# Patient Record
Sex: FEMALE | Race: WHITE | Employment: OTHER | URBAN - NONMETROPOLITAN AREA
[De-identification: names, ages, dates, MRNs, and addresses within clinical notes are randomized per-mention and may not be internally consistent; named-entity substitution may affect disease eponyms.]

---

## 2017-01-05 ENCOUNTER — APPOINTMENT (OUTPATIENT)
Dept: LAB | Age: 67
End: 2017-01-05
Attending: FAMILY MEDICINE
Payer: MEDICARE

## 2017-01-05 DIAGNOSIS — R16.0 HEPATOMEGALY: ICD-10-CM

## 2017-01-05 DIAGNOSIS — K76.0 FATTY LIVER: ICD-10-CM

## 2017-01-05 LAB
ALPHA 1 ANTITRYPSIN SERUM: 127 MG/DL (ref 90–200)
CERULOPLASMIN: 29.4 MG/DL (ref 20–60)
DEPRECATED HBV CORE AB SER IA-ACNC: 144.2 NG/ML (ref 10–291)
HAV IGM SER QL: NONREACTIVE
HBV CORE IGM SER QL: NONREACTIVE
HBV SURFACE AB SER QL: NONREACTIVE
HBV SURFACE AB SERPL IA-ACNC: <3.1 MIU/ML
HBV SURFACE AG SERPL QL IA: NONREACTIVE
HEPATITIS C VIRUS AB INTERPRETATION: NONREACTIVE
IMMUNOGLOBULIN A: 267 MG/DL (ref 70–312)
IRON SATURATION: 22 % (ref 13–45)
IRON: 94 UG/DL (ref 28–170)
TOTAL IRON BINDING CAPACITY: 419 UG/DL (ref 298–536)
TRANSFERRIN: 281 MG/DL (ref 200–360)

## 2017-01-05 PROCEDURE — 82784 ASSAY IGA/IGD/IGG/IGM EACH: CPT

## 2017-01-05 PROCEDURE — 86038 ANTINUCLEAR ANTIBODIES: CPT

## 2017-01-05 PROCEDURE — 82728 ASSAY OF FERRITIN: CPT

## 2017-01-05 PROCEDURE — 82390 ASSAY OF CERULOPLASMIN: CPT

## 2017-01-05 PROCEDURE — 83516 IMMUNOASSAY NONANTIBODY: CPT

## 2017-01-05 PROCEDURE — 82103 ALPHA-1-ANTITRYPSIN TOTAL: CPT

## 2017-01-05 PROCEDURE — 84432 ASSAY OF THYROGLOBULIN: CPT

## 2017-01-05 PROCEDURE — 80074 ACUTE HEPATITIS PANEL: CPT

## 2017-01-05 PROCEDURE — 83540 ASSAY OF IRON: CPT

## 2017-01-05 PROCEDURE — 36415 COLL VENOUS BLD VENIPUNCTURE: CPT

## 2017-01-05 PROCEDURE — 83550 IRON BINDING TEST: CPT

## 2017-01-05 PROCEDURE — 86706 HEP B SURFACE ANTIBODY: CPT

## 2017-01-05 PROCEDURE — 86708 HEPATITIS A ANTIBODY: CPT

## 2017-01-06 LAB — HEPATITIS A VIRUS AB, TOTAL: REACTIVE

## 2017-01-07 LAB
F-ACTIN (SMOOTH MUSCLE) AB: 8 UNITS
MITOCHONDRIAL M2 AB, IGG: 5 UNITS

## 2017-01-08 LAB — ANA SCREEN: POSITIVE

## 2017-01-12 ENCOUNTER — PATIENT OUTREACH (OUTPATIENT)
Dept: FAMILY MEDICINE CLINIC | Facility: CLINIC | Age: 67
End: 2017-01-12

## 2017-01-17 ENCOUNTER — LAB ENCOUNTER (OUTPATIENT)
Dept: LAB | Age: 67
End: 2017-01-17
Attending: FAMILY MEDICINE
Payer: MEDICARE

## 2017-01-17 ENCOUNTER — NURSE ONLY (OUTPATIENT)
Dept: FAMILY MEDICINE CLINIC | Facility: CLINIC | Age: 67
End: 2017-01-17

## 2017-01-17 DIAGNOSIS — Z23 NEED FOR VACCINATION: Primary | ICD-10-CM

## 2017-01-17 DIAGNOSIS — K76.0 FATTY LIVER: ICD-10-CM

## 2017-01-17 DIAGNOSIS — R16.2 HEPATOSPLENOMEGALY: ICD-10-CM

## 2017-01-17 PROCEDURE — 86225 DNA ANTIBODY NATIVE: CPT

## 2017-01-17 PROCEDURE — 86376 MICROSOMAL ANTIBODY EACH: CPT

## 2017-01-17 PROCEDURE — 86038 ANTINUCLEAR ANTIBODIES: CPT

## 2017-01-17 PROCEDURE — 90746 HEPB VACCINE 3 DOSE ADULT IM: CPT | Performed by: FAMILY MEDICINE

## 2017-01-17 PROCEDURE — 83516 IMMUNOASSAY NONANTIBODY: CPT

## 2017-01-17 PROCEDURE — 36415 COLL VENOUS BLD VENIPUNCTURE: CPT

## 2017-01-17 PROCEDURE — G0010 ADMIN HEPATITIS B VACCINE: HCPCS | Performed by: FAMILY MEDICINE

## 2017-01-17 PROCEDURE — 86235 NUCLEAR ANTIGEN ANTIBODY: CPT

## 2017-01-19 LAB
LIVER-KIDNEY MICROSOME-1, IGG: 3.3 U
TISSUE TRANSGLUTAMINASE AB,IGA: 3.6 U/ML (ref ?–15)

## 2017-01-20 LAB
CENTROMERE AUTOAB: <100 AU/ML (ref ?–100)
DSDNA AUTOAB: <100 IU/ML (ref ?–100)
HISTONE AUTOAB: <100 AU/ML (ref ?–100)
JO-1 AUTOAB: <100 AU/ML (ref ?–100)
RNP AUTOAB: <100 AU/ML (ref ?–100)
SCL-70 AUTOAB: <100 AU/ML (ref ?–100)
SM AUTOAB (SMITH): <100 AU/ML (ref ?–100)
SSA AUTOAB: 109 AU/ML (ref ?–100)
SSB AUTOAB: <100 AU/ML (ref ?–100)

## 2017-01-25 ENCOUNTER — PATIENT OUTREACH (OUTPATIENT)
Dept: CASE MANAGEMENT | Age: 67
End: 2017-01-25

## 2017-01-25 NOTE — PROGRESS NOTES
Spoke to pt for CCM. Pt requested NCM f/u next month in February. Will f/u as requested. Encounter closing.

## 2017-01-27 ENCOUNTER — TELEPHONE (OUTPATIENT)
Dept: FAMILY MEDICINE CLINIC | Facility: CLINIC | Age: 67
End: 2017-01-27

## 2017-01-27 NOTE — TELEPHONE ENCOUNTER
Results are received from [facility] for the patient's Dexa Scan   DOS 1/26/16    Results indicated bone density is in osteopenic range   Best protection is calcium/vit D through diet and exercise    Patient advised and verbalized her understanding

## 2017-02-01 ENCOUNTER — MED REC SCAN ONLY (OUTPATIENT)
Dept: FAMILY MEDICINE CLINIC | Facility: CLINIC | Age: 67
End: 2017-02-01

## 2017-02-17 ENCOUNTER — NURSE ONLY (OUTPATIENT)
Dept: FAMILY MEDICINE CLINIC | Facility: CLINIC | Age: 67
End: 2017-02-17

## 2017-02-17 DIAGNOSIS — Z23 NEED FOR VACCINATION: Primary | ICD-10-CM

## 2017-02-17 PROCEDURE — G0010 ADMIN HEPATITIS B VACCINE: HCPCS | Performed by: FAMILY MEDICINE

## 2017-02-17 PROCEDURE — 90746 HEPB VACCINE 3 DOSE ADULT IM: CPT | Performed by: FAMILY MEDICINE

## 2017-02-28 ENCOUNTER — PATIENT OUTREACH (OUTPATIENT)
Dept: CASE MANAGEMENT | Age: 67
End: 2017-02-28

## 2017-03-06 ENCOUNTER — TELEPHONE (OUTPATIENT)
Dept: FAMILY MEDICINE CLINIC | Facility: CLINIC | Age: 67
End: 2017-03-06

## 2017-03-06 RX ORDER — AMLODIPINE BESYLATE 10 MG/1
10 TABLET ORAL DAILY
Qty: 90 TABLET | Refills: 3 | Status: SHIPPED | OUTPATIENT
Start: 2017-03-06 | End: 2018-02-10

## 2017-03-16 ENCOUNTER — MED REC SCAN ONLY (OUTPATIENT)
Dept: FAMILY MEDICINE CLINIC | Facility: CLINIC | Age: 67
End: 2017-03-16

## 2017-03-23 ENCOUNTER — CHARTING TRANS (OUTPATIENT)
Dept: OTHER | Age: 67
End: 2017-03-23

## 2017-03-27 ENCOUNTER — OFFICE VISIT (OUTPATIENT)
Dept: FAMILY MEDICINE CLINIC | Facility: CLINIC | Age: 67
End: 2017-03-27

## 2017-03-27 VITALS
HEIGHT: 62.5 IN | TEMPERATURE: 98 F | SYSTOLIC BLOOD PRESSURE: 132 MMHG | DIASTOLIC BLOOD PRESSURE: 60 MMHG | HEART RATE: 60 BPM | OXYGEN SATURATION: 95 % | WEIGHT: 179 LBS | BODY MASS INDEX: 32.12 KG/M2

## 2017-03-27 DIAGNOSIS — G89.29 CHRONIC RIGHT-SIDED LOW BACK PAIN WITHOUT SCIATICA: ICD-10-CM

## 2017-03-27 DIAGNOSIS — M54.50 CHRONIC RIGHT-SIDED LOW BACK PAIN WITHOUT SCIATICA: ICD-10-CM

## 2017-03-27 DIAGNOSIS — M77.8 TENDINITIS OF RIGHT WRIST: Primary | ICD-10-CM

## 2017-03-27 PROCEDURE — 99214 OFFICE O/P EST MOD 30 MIN: CPT | Performed by: FAMILY MEDICINE

## 2017-03-27 RX ORDER — SIMVASTATIN 20 MG
20 TABLET ORAL NIGHTLY
Qty: 90 TABLET | Refills: 1 | Status: SHIPPED | OUTPATIENT
Start: 2017-03-27 | End: 2017-03-27

## 2017-03-27 RX ORDER — SIMVASTATIN 20 MG
20 TABLET ORAL NIGHTLY
Qty: 90 TABLET | Refills: 1 | Status: SHIPPED | OUTPATIENT
Start: 2017-03-27 | End: 2017-04-21

## 2017-03-27 NOTE — PROGRESS NOTES
Karma Whyte is a 74year old female. Patient presents with: Other: RT wrist pain that started approx 1.5 wks ago---also LT leg/hip pain, will go out on pt sometimes, also RT lower side back pain into buttock. ... Nacho Sloan room 1      HPI:   Patient complains of Disp:  Rfl:    thioTHIXene (NAVANE) 1 MG Oral Cap Take 1 capsule by mouth 3 (three) times daily. (Patient taking differently: Take 1 mg by mouth daily.  ) Disp: 36 capsule Rfl: 0   Omega-3 Fatty Acids (FISH OIL) 1200 MG Oral Cap Take  by mouth daily.  Disp: Mother      COPD   • Hypertension Mother    • Hypertension Brother    • Other[other] [OTHER] Brother         Social History:    Smoking Status: Former Smoker                   Packs/Day: 1.00  Years: 40        Types: Cigarettes      Quit date: 04/05/2016 bilat              ASSESSMENT AND PLAN:     Tendinitis of right wrist  (primary encounter diagnosis)  Chronic right-sided low back pain without sciatica    Thumb spica splint for 2 weeks to the right thumb. She has more of a de Quervain's tenosynovitis.

## 2017-03-27 NOTE — TELEPHONE ENCOUNTER
Future Appointments  Date Time Provider Chandan Cody   3/27/2017 4:00 PM Shelby Gómez DO [facility] [place]   7/18/2017 11:00 AM EMG SANDWICH NURSE [facility] [place]   10/12/2017 10:30 AM Shelby Gómez DO [facility] [place]

## 2017-03-28 ENCOUNTER — CHARTING TRANS (OUTPATIENT)
Dept: OTHER | Age: 67
End: 2017-03-28

## 2017-04-03 ENCOUNTER — TELEPHONE (OUTPATIENT)
Dept: FAMILY MEDICINE CLINIC | Facility: CLINIC | Age: 67
End: 2017-04-03

## 2017-04-03 NOTE — TELEPHONE ENCOUNTER
I called the patient- she will have her   the new brace  She advised that she feels like her left wrist is starting as well? Not as bad as the right though?    I advised that she should f/up with Dr Tawny Wise

## 2017-04-03 NOTE — TELEPHONE ENCOUNTER
PT STATES HER BRACE IS AN XL, THIS IS TOO BIG AND IS HURTING HER HAND WANTS TO GET A SMALLER SIZE.  ALSO,   DR ROBERTS PRESCIRIBED HER JUBLIA, WANTS TO MAKE SURE IT DOESNT HAVE ANY INTERACTIONS WITH HER MEDICATIONS     FYI- when she was here I tried to have he

## 2017-04-05 ENCOUNTER — HOSPITAL ENCOUNTER (OUTPATIENT)
Dept: CT IMAGING | Facility: HOSPITAL | Age: 67
Discharge: HOME | End: 2017-04-05
Attending: INTERNAL MEDICINE
Payer: MEDICARE

## 2017-04-05 ENCOUNTER — OFFICE VISIT (OUTPATIENT)
Dept: SURGERY | Facility: CLINIC | Age: 67
End: 2017-04-05

## 2017-04-05 VITALS
DIASTOLIC BLOOD PRESSURE: 80 MMHG | HEART RATE: 62 BPM | HEIGHT: 62 IN | WEIGHT: 178 LBS | SYSTOLIC BLOOD PRESSURE: 138 MMHG | TEMPERATURE: 98 F | BODY MASS INDEX: 32.76 KG/M2 | OXYGEN SATURATION: 96 %

## 2017-04-05 DIAGNOSIS — R91.8 PULMONARY NODULES: ICD-10-CM

## 2017-04-05 DIAGNOSIS — R16.0 HEPATOMEGALY: Primary | ICD-10-CM

## 2017-04-05 PROCEDURE — 71250 CT THORAX DX C-: CPT

## 2017-04-05 NOTE — PROGRESS NOTES
[facility] at [facility]  [address], [address]  [address]  [facility] [address]  [id number] ([phone]) ORTHOPEDIC SURG (PBP)      Comment back surgery    MASTECTOMY LEFT      Comment has implants- not due to CA    MASTECTOMY RIGHT      Comment has implants-not due to CA    BACK SURGERY  2007    HYSTERECTOMY  1989    Comment also tummy tuck      Family Histo needed. , Disp: 14 tablet, Rfl: 0  •  Clobetasol Propionate (TEMOVATE) 0.05 % Apply Externally Cream, Apply  topically 2 (two) times daily. , Disp: , Rfl:   •  Vitamin D3 (VITAMIN D3) 2000 UNITS Oral Cap, TAKE TWO CAPS OF 2,000 MG DAILY TO EQUAL 4,000 MG RADHA (RN)  [phone] (administrative office)  [phone] (administrative fax)  [phone] (mobile)  [email or web address]

## 2017-04-14 ENCOUNTER — APPOINTMENT (OUTPATIENT)
Dept: LAB | Age: 67
End: 2017-04-14
Attending: FAMILY MEDICINE
Payer: MEDICARE

## 2017-04-14 DIAGNOSIS — E78.5 HYPERLIPIDEMIA, UNSPECIFIED HYPERLIPIDEMIA TYPE: ICD-10-CM

## 2017-04-14 DIAGNOSIS — I10 ESSENTIAL HYPERTENSION: ICD-10-CM

## 2017-04-14 DIAGNOSIS — R73.01 IMPAIRED FASTING GLUCOSE: ICD-10-CM

## 2017-04-14 PROCEDURE — 36415 COLL VENOUS BLD VENIPUNCTURE: CPT

## 2017-04-14 PROCEDURE — 80053 COMPREHEN METABOLIC PANEL: CPT

## 2017-04-14 PROCEDURE — 83036 HEMOGLOBIN GLYCOSYLATED A1C: CPT

## 2017-04-14 PROCEDURE — 80061 LIPID PANEL: CPT

## 2017-04-15 NOTE — PROGRESS NOTES
Quick Note:    Notify lipids are at goal. Recheck in 6 months. Notify chemistry profile is unremarkable. Recheck in 6 months. Notify HGbA1C is controlled.  Recheck in 6 months.      ______

## 2017-04-20 ENCOUNTER — PATIENT OUTREACH (OUTPATIENT)
Dept: CASE MANAGEMENT | Age: 67
End: 2017-04-20

## 2017-04-21 ENCOUNTER — TELEPHONE (OUTPATIENT)
Dept: FAMILY MEDICINE CLINIC | Facility: CLINIC | Age: 67
End: 2017-04-21

## 2017-04-21 RX ORDER — SIMVASTATIN 20 MG
20 TABLET ORAL NIGHTLY
Qty: 90 TABLET | Refills: 1 | Status: SHIPPED | OUTPATIENT
Start: 2017-04-21 | End: 2017-09-21

## 2017-05-01 ENCOUNTER — TELEPHONE (OUTPATIENT)
Dept: FAMILY MEDICINE CLINIC | Facility: CLINIC | Age: 67
End: 2017-05-01

## 2017-05-01 RX ORDER — IBUPROFEN 400 MG/1
400 TABLET ORAL EVERY 8 HOURS PRN
Qty: 30 TABLET | Refills: 0 | Status: SHIPPED | OUTPATIENT
Start: 2017-05-01 | End: 2017-07-21

## 2017-05-01 NOTE — TELEPHONE ENCOUNTER
TENDONITIS IS STILL ACTING UP, 6-8 WEEKS AT LEAST.  SHE SAID IT ISN'T GETTING BETTER, SHOULD SHE COME IN AND SEE DR OR SHOULD SHE SEE SOMEBODY ELSE? Last OV was 3/27/17.    Calling the patient-  She was having stabbing pains last night in bed into her wr

## 2017-05-01 NOTE — TELEPHONE ENCOUNTER
Call in ibuprofen 400 mg 1 po tid with food but cannot take for more than 2 weeks to avoid stomach and kidney injury

## 2017-05-01 NOTE — TELEPHONE ENCOUNTER
ANALIA TOOK 4 ADVIL TODAY AND THEY ARE DOING NOTHING FOR HER PAIN, SHE DOESN'T WANT PAIN MEDS, SHE WAS WONDERING IF SHE COULD GET A HIGHER DOSE OF ADVIL? I spoke with the patient earlier and she is still having a lot of wrist pain.  She has been wearin

## 2017-05-02 ENCOUNTER — OFFICE VISIT (OUTPATIENT)
Dept: FAMILY MEDICINE CLINIC | Facility: CLINIC | Age: 67
End: 2017-05-02

## 2017-05-02 VITALS
HEIGHT: 62 IN | DIASTOLIC BLOOD PRESSURE: 74 MMHG | TEMPERATURE: 98 F | BODY MASS INDEX: 33.15 KG/M2 | SYSTOLIC BLOOD PRESSURE: 110 MMHG | HEART RATE: 62 BPM | OXYGEN SATURATION: 98 % | WEIGHT: 180.13 LBS

## 2017-05-02 DIAGNOSIS — M25.531 RIGHT WRIST PAIN: Primary | ICD-10-CM

## 2017-05-02 DIAGNOSIS — R73.01 IMPAIRED FASTING GLUCOSE: ICD-10-CM

## 2017-05-02 PROCEDURE — 99213 OFFICE O/P EST LOW 20 MIN: CPT | Performed by: FAMILY MEDICINE

## 2017-05-02 NOTE — PROGRESS NOTES
Rustam Nolasco is a 74year old female. Patient presents with: Other: f/up on RT wrist pain--not getting better, Ibuprofen is helping a lot. ...room 2      HPI:   Patient was cleaning at home doing a lot of repetitive work and developed pain to her right Oral Cap Take 1 capsule by mouth 3 (three) times daily. (Patient taking differently: Take 1 mg by mouth daily.  ) Disp: 36 capsule Rfl: 0   Omega-3 Fatty Acids (FISH OIL) 1200 MG Oral Cap Take  by mouth daily.  Disp:  Rfl:    aspirin 81 MG Oral Chew Tab Ana Packs/Day: 1.00  Years: 40        Types: Cigarettes      Quit date: 04/05/2016    Smokeless Status: Never Used                        Alcohol Use: Yes           0.0 oz/week       0 Standard drinks or equivalent per week       Comment: 2-3

## 2017-05-22 ENCOUNTER — TELEPHONE (OUTPATIENT)
Dept: FAMILY MEDICINE CLINIC | Facility: CLINIC | Age: 67
End: 2017-05-22

## 2017-05-22 RX ORDER — METOPROLOL SUCCINATE 100 MG/1
100 TABLET, EXTENDED RELEASE ORAL
Qty: 90 TABLET | Refills: 1 | Status: SHIPPED | OUTPATIENT
Start: 2017-05-22 | End: 2017-12-02

## 2017-06-06 ENCOUNTER — MED REC SCAN ONLY (OUTPATIENT)
Dept: FAMILY MEDICINE CLINIC | Facility: CLINIC | Age: 67
End: 2017-06-06

## 2017-06-19 ENCOUNTER — CHARTING TRANS (OUTPATIENT)
Dept: OTHER | Age: 67
End: 2017-06-19

## 2017-06-21 ENCOUNTER — LAB ENCOUNTER (OUTPATIENT)
Dept: LAB | Age: 67
End: 2017-06-21
Attending: FAMILY MEDICINE
Payer: MEDICARE

## 2017-06-21 DIAGNOSIS — K76.0 FATTY METAMORPHOSIS OF LIVER: Primary | ICD-10-CM

## 2017-06-21 PROCEDURE — 80053 COMPREHEN METABOLIC PANEL: CPT

## 2017-06-21 PROCEDURE — 36415 COLL VENOUS BLD VENIPUNCTURE: CPT

## 2017-06-21 PROCEDURE — 85025 COMPLETE CBC W/AUTO DIFF WBC: CPT

## 2017-06-28 ENCOUNTER — HOSPITAL ENCOUNTER (OUTPATIENT)
Dept: CV DIAGNOSTICS | Age: 67
Discharge: HOME | End: 2017-06-28
Attending: INTERNAL MEDICINE

## 2017-06-28 ENCOUNTER — PRIOR ORIGINAL RECORDS (OUTPATIENT)
Dept: OTHER | Age: 67
End: 2017-06-28

## 2017-06-28 DIAGNOSIS — I65.23 BILATERAL CAROTID ARTERY STENOSIS: ICD-10-CM

## 2017-07-06 ENCOUNTER — APPOINTMENT (OUTPATIENT)
Dept: CT IMAGING | Age: 67
End: 2017-07-06
Attending: EMERGENCY MEDICINE
Payer: MEDICARE

## 2017-07-06 ENCOUNTER — PRIOR ORIGINAL RECORDS (OUTPATIENT)
Dept: OTHER | Age: 67
End: 2017-07-06

## 2017-07-06 ENCOUNTER — HOSPITAL ENCOUNTER (OUTPATIENT)
Age: 67
Discharge: HOME | End: 2017-07-06
Attending: EMERGENCY MEDICINE
Payer: MEDICARE

## 2017-07-06 ENCOUNTER — TELEPHONE (OUTPATIENT)
Dept: FAMILY MEDICINE CLINIC | Facility: CLINIC | Age: 67
End: 2017-07-06

## 2017-07-06 VITALS
HEIGHT: 61 IN | RESPIRATION RATE: 20 BRPM | TEMPERATURE: 98 F | SYSTOLIC BLOOD PRESSURE: 140 MMHG | DIASTOLIC BLOOD PRESSURE: 58 MMHG | HEART RATE: 64 BPM | WEIGHT: 175 LBS | OXYGEN SATURATION: 97 % | BODY MASS INDEX: 33.04 KG/M2

## 2017-07-06 DIAGNOSIS — E87.1 HYPONATREMIA: ICD-10-CM

## 2017-07-06 DIAGNOSIS — E87.8 HYPOCHLOREMIA: ICD-10-CM

## 2017-07-06 DIAGNOSIS — G44.209 TENSION HEADACHE: Primary | ICD-10-CM

## 2017-07-06 DIAGNOSIS — R11.0 NAUSEA: ICD-10-CM

## 2017-07-06 LAB
#LYMPHOCYTE IC: 2.3 X10ˆ3/UL (ref 0.9–3.2)
#MXD IC: 0.6 X10ˆ3/UL (ref 0.1–1)
#NEUTROPHIL IC: 5.6 X10ˆ3/UL (ref 1.3–6.7)
CREAT SERPL-MCNC: 0.6 MG/DL (ref 0.4–1)
GLUCOSE BLD-MCNC: 104 MG/DL (ref 65–99)
HCT IC: 37.2 % (ref 37–54)
HGB IC: 12.8 G/DL (ref 11.7–16)
ISTAT BLOOD GAS TCO2: 27 MMOL/L (ref 22–32)
ISTAT BUN: 15 MG/DL (ref 8–20)
ISTAT CHLORIDE: 93 MMOL/L (ref 101–111)
ISTAT HEMATOCRIT: 38 % (ref 37–54)
ISTAT IONIZED CALCIUM: 1.16 MMOL/L (ref 1.12–1.32)
ISTAT POTASSIUM: 3.7 MMOL/L (ref 3.6–5.1)
ISTAT SODIUM: 135 MMOL/L (ref 136–144)
ISTAT TROPONIN: <0.1 NG/ML (ref ?–0.1)
LYMPHOCYTES NFR BLD AUTO: 27.3 %
MCH IC: 30.7 PG (ref 27–33.2)
MCHC IC: 34.4 G/DL (ref 31–37)
MCV IC: 89.2 FL (ref 81–100)
MIXED CELL %: 7.4 %
NEUTROPHILS NFR BLD AUTO: 65.3 %
PLT IC: 196 X10ˆ3/UL (ref 150–450)
POCT BILIRUBIN URINE: NEGATIVE
POCT BLOOD URINE: NEGATIVE
POCT GLUCOSE URINE: NEGATIVE MG/DL
POCT KETONE URINE: NEGATIVE MG/DL
POCT LEUKOCYTE ESTERASE URINE: NEGATIVE
POCT NITRITE URINE: NEGATIVE
POCT PH URINE: 6 (ref 5–8)
POCT PROTEIN URINE: NEGATIVE MG/DL
POCT SPECIFIC GRAVITY URINE: 1
POCT URINE CLARITY: CLEAR
POCT URINE COLOR: YELLOW
POCT UROBILINOGEN URINE: 0.2 MG/DL
RBC IC: 4.17 X10ˆ6/UL (ref 3.8–5.1)
WBC IC: 8.5 X10ˆ3/UL (ref 4–13)

## 2017-07-06 PROCEDURE — 93010 ELECTROCARDIOGRAM REPORT: CPT

## 2017-07-06 PROCEDURE — 96374 THER/PROPH/DIAG INJ IV PUSH: CPT

## 2017-07-06 PROCEDURE — 81002 URINALYSIS NONAUTO W/O SCOPE: CPT | Performed by: EMERGENCY MEDICINE

## 2017-07-06 PROCEDURE — 99215 OFFICE O/P EST HI 40 MIN: CPT

## 2017-07-06 PROCEDURE — 72125 CT NECK SPINE W/O DYE: CPT | Performed by: EMERGENCY MEDICINE

## 2017-07-06 PROCEDURE — 96375 TX/PRO/DX INJ NEW DRUG ADDON: CPT

## 2017-07-06 PROCEDURE — 85025 COMPLETE CBC W/AUTO DIFF WBC: CPT | Performed by: EMERGENCY MEDICINE

## 2017-07-06 PROCEDURE — 84484 ASSAY OF TROPONIN QUANT: CPT

## 2017-07-06 PROCEDURE — 93005 ELECTROCARDIOGRAM TRACING: CPT

## 2017-07-06 PROCEDURE — 70450 CT HEAD/BRAIN W/O DYE: CPT | Performed by: EMERGENCY MEDICINE

## 2017-07-06 PROCEDURE — 96361 HYDRATE IV INFUSION ADD-ON: CPT

## 2017-07-06 PROCEDURE — 80047 BASIC METABLC PNL IONIZED CA: CPT

## 2017-07-06 RX ORDER — METOCLOPRAMIDE HYDROCHLORIDE 5 MG/ML
10 INJECTION INTRAMUSCULAR; INTRAVENOUS ONCE
Status: COMPLETED | OUTPATIENT
Start: 2017-07-06 | End: 2017-07-06

## 2017-07-06 RX ORDER — SODIUM CHLORIDE 9 MG/ML
1000 INJECTION, SOLUTION INTRAVENOUS ONCE
Status: COMPLETED | OUTPATIENT
Start: 2017-07-06 | End: 2017-07-06

## 2017-07-06 RX ORDER — DIPHENHYDRAMINE HYDROCHLORIDE 50 MG/ML
25 INJECTION INTRAMUSCULAR; INTRAVENOUS ONCE
Status: COMPLETED | OUTPATIENT
Start: 2017-07-06 | End: 2017-07-06

## 2017-07-06 NOTE — ED PROVIDER NOTES
Patient presents with:  Fall (musculoskeletal, neurologic)    HPI:     Torres Zhou is a 69year old female who presents with chief complaint of headache, nausea, dizziness. Pt fell 11 days ago in the driveway, unwitnessed.   States it was a mechanical f distress  Heart: regular rate and rhythm  Abdomen: soft,non-distended, non-tender. No pulsatile masses. Extremities: extremities normal, atraumatic, no cyanosis or edema  Pulses: 2+ and symmetric  Skin:  No rashes, lesions or abrasions.   Neuro:  GCS 15, lesion. SINUSES:           No sign of acute sinusitis. MASTOIDS:          No sign of acute inflammation. SKULL:             No evidence for fracture or osseous abnormality. OTHER:             None. CONCLUSION:  No significant disease appreciated. tension headache, or tension headache with cervical tension due to underlying volume depletion. Labs with mild hyponatremia 135 and hypochloremia 93. Given 1L IVF along with reglan and benadryl. Pt states she feels much better. Symptoms have resolved.

## 2017-07-06 NOTE — TELEPHONE ENCOUNTER
Two weeks ago Sunday tripped in yard. Landed on right palm, but left side of face hit the ground. Had fat lip and face was bruised. Now starting with headache, back of neck/head hurts. Getting lightheaded and nauseated.   Not sure if it is related to th

## 2017-07-06 NOTE — ED INITIAL ASSESSMENT (HPI)
11 days ago tripped and fell in the driveway. States she put her hand out but fell hitting her left side of her face on the concrete. States not sure if she lost consciousness but woke up and went in the house. Since Tuesday constant headache and nausea.  P

## 2017-07-07 LAB
ATRIAL RATE: 56 BPM
P AXIS: 67 DEGREES
P-R INTERVAL: 174 MS
Q-T INTERVAL: 478 MS
QRS DURATION: 80 MS
QTC CALCULATION (BEZET): 461 MS
R AXIS: 14 DEGREES
T AXIS: 29 DEGREES
VENTRICULAR RATE: 56 BPM

## 2017-07-10 ENCOUNTER — TELEPHONE (OUTPATIENT)
Dept: FAMILY MEDICINE CLINIC | Facility: CLINIC | Age: 67
End: 2017-07-10

## 2017-07-10 RX ORDER — MECLIZINE HYDROCHLORIDE 25 MG/1
25 TABLET ORAL 3 TIMES DAILY PRN
Qty: 15 TABLET | Refills: 0 | Status: SHIPPED | OUTPATIENT
Start: 2017-07-10 | End: 2017-07-14

## 2017-07-10 NOTE — TELEPHONE ENCOUNTER
Calling [facility] [employer]-     She fell about two weeks ago   Last Thursday she went to the  in [place] and had a CT scan done of brain and neck-it was ok.  She was diagnosed w/ dehydration   She has been drinking Propel as recommended     She will get dizzy and nause

## 2017-07-10 NOTE — TELEPHONE ENCOUNTER
Calling the patient-     Future Appointments  Date Time Provider Chandan Cody   7/11/2017 10:00 AM Mikaela Ramos DO [facility] [place]   7/18/2017 11:00 AM EMG SANDWICH NURSE [facility] [place]   7/25/2017 9:20 AM MD Vera Friend

## 2017-07-11 ENCOUNTER — OFFICE VISIT (OUTPATIENT)
Dept: FAMILY MEDICINE CLINIC | Facility: CLINIC | Age: 67
End: 2017-07-11

## 2017-07-11 VITALS
HEART RATE: 59 BPM | WEIGHT: 179.5 LBS | TEMPERATURE: 98 F | SYSTOLIC BLOOD PRESSURE: 120 MMHG | HEIGHT: 62 IN | DIASTOLIC BLOOD PRESSURE: 60 MMHG | OXYGEN SATURATION: 96 % | BODY MASS INDEX: 33.03 KG/M2

## 2017-07-11 DIAGNOSIS — S06.0X0D CONCUSSION, WITHOUT LOC, SUBSEQUENT ENCOUNTER: Primary | ICD-10-CM

## 2017-07-11 DIAGNOSIS — H81.10 BPV (BENIGN POSITIONAL VERTIGO), UNSPECIFIED LATERALITY: ICD-10-CM

## 2017-07-11 PROCEDURE — 99214 OFFICE O/P EST MOD 30 MIN: CPT | Performed by: FAMILY MEDICINE

## 2017-07-11 NOTE — PATIENT INSTRUCTIONS
Benign Paroxysmal Positional Vertigo    Benign paroxysmal positional vertigo is a common condition. You feel as if the room is spinning after changing position, moving your head quickly, or even just rolling over in bed.   Vertigo is a false feeling of mo If you had a CT or MRI scan, a specialist will review it. You will be told of any new findings that may affect your care.   When to seek medical advice  Call your healthcare provider right away if any of these occur:  · Vertigo gets worse even after taking An episode of vertigo may last seconds, minutes, or hours. Once you are over the first episode of vertigo, it may never return. Sometimes symptoms return off and on over several weeks or longer.   Home care  Follow these guidelines when caring for yourself Benign Paroxysmal Positional Vertigo    Benign paroxysmal positional vertigo is a common condition. You feel as if the room is spinning after changing position, moving your head quickly, or even just rolling over in bed.   Vertigo is a false feeling of charles

## 2017-07-11 NOTE — PROGRESS NOTES
Attila Slade is a 69year old female. Patient presents with: Other: dizziness--started really bad yesterday--has been taking meds since yesterday--pt also fell approx 2 wks ago. ........ room 1      HPI:   Patient fell 2 weeks ago.   She sustained a head HCl (ZOLOFT) 100 MG Oral Tab Take 50 mg by mouth daily. Disp:  Rfl:    ClonazePAM (KLONOPIN) 0.5 MG Oral Tab Take 1 tablet by mouth nightly as needed.  Disp: 14 tablet Rfl: 0   Clobetasol Propionate (TEMOVATE) 0.05 % Apply Externally Cream Apply  topicall MASTECTOMY RIGHT      Comment: has implants-not due to CA  No date: ORTHOPEDIC SURG (PBP)      Comment: back surgery  No date: OTHER SURGICAL HISTORY      Comment: herniated disc  Family History   Problem Relation Age of Onset   • Other[other] [OTHER] Fath positional vertigo), unspecified laterality    Discussed concussion and vertigo. Continue the meclizine. Needs to rest.  Limit physical activity as much as possible the next 7 days. See PI  No orders of the defined types were placed in this encounter.

## 2017-07-13 ENCOUNTER — TELEPHONE (OUTPATIENT)
Dept: FAMILY MEDICINE CLINIC | Facility: CLINIC | Age: 67
End: 2017-07-13

## 2017-07-13 NOTE — TELEPHONE ENCOUNTER
Meclizine 25mg, if she is going to be taking this medicine long term please send to [place], if short term she needs the med called into [facility]. Call pt. it is helping with her nausea but not with the headaches.

## 2017-07-14 ENCOUNTER — TELEPHONE (OUTPATIENT)
Dept: FAMILY MEDICINE CLINIC | Facility: CLINIC | Age: 67
End: 2017-07-14

## 2017-07-14 RX ORDER — MECLIZINE HYDROCHLORIDE 25 MG/1
25 TABLET ORAL 3 TIMES DAILY PRN
Qty: 15 TABLET | Refills: 0 | Status: SHIPPED | OUTPATIENT
Start: 2017-07-14 | End: 2017-07-28

## 2017-07-14 NOTE — TELEPHONE ENCOUNTER
Calling the patient- she fell again this morning. She tripped on one of the cat boxes and fell into the fireplace. She did not hit her head.      I explained that the meclizine is not long term so I will send to [facility]   She is taking the Aleve because t

## 2017-07-14 NOTE — TELEPHONE ENCOUNTER
How long do you want the patient on the Meclizine?    She either needs a refill sent to [facility] (she would be out today) or she needs a larger script sent to her mailorder     She advised that it helps with her nausea, but not the headaches  What do you r

## 2017-07-14 NOTE — TELEPHONE ENCOUNTER
Tylenol for the headaches. Okay to fill the meclizine but she should stop using it once the dizziness is gone.

## 2017-07-18 ENCOUNTER — NURSE ONLY (OUTPATIENT)
Dept: FAMILY MEDICINE CLINIC | Facility: CLINIC | Age: 67
End: 2017-07-18

## 2017-07-18 DIAGNOSIS — Z23 NEED FOR VACCINATION: Primary | ICD-10-CM

## 2017-07-18 PROCEDURE — G0010 ADMIN HEPATITIS B VACCINE: HCPCS | Performed by: FAMILY MEDICINE

## 2017-07-18 PROCEDURE — 90746 HEPB VACCINE 3 DOSE ADULT IM: CPT | Performed by: FAMILY MEDICINE

## 2017-07-21 ENCOUNTER — OFFICE VISIT (OUTPATIENT)
Dept: FAMILY MEDICINE CLINIC | Facility: CLINIC | Age: 67
End: 2017-07-21

## 2017-07-21 ENCOUNTER — TELEPHONE (OUTPATIENT)
Dept: FAMILY MEDICINE CLINIC | Facility: CLINIC | Age: 67
End: 2017-07-21

## 2017-07-21 VITALS
TEMPERATURE: 98 F | DIASTOLIC BLOOD PRESSURE: 66 MMHG | SYSTOLIC BLOOD PRESSURE: 138 MMHG | HEART RATE: 61 BPM | WEIGHT: 182.5 LBS | BODY MASS INDEX: 33 KG/M2 | OXYGEN SATURATION: 97 %

## 2017-07-21 DIAGNOSIS — I10 ESSENTIAL HYPERTENSION: ICD-10-CM

## 2017-07-21 DIAGNOSIS — M54.41 ACUTE RIGHT-SIDED LOW BACK PAIN WITH RIGHT-SIDED SCIATICA: Primary | ICD-10-CM

## 2017-07-21 PROCEDURE — 99214 OFFICE O/P EST MOD 30 MIN: CPT | Performed by: FAMILY MEDICINE

## 2017-07-21 RX ORDER — CARISOPRODOL 250 MG/1
250 TABLET ORAL 3 TIMES DAILY PRN
Qty: 15 TABLET | Refills: 0 | Status: SHIPPED
Start: 2017-07-21 | End: 2017-07-28

## 2017-07-21 NOTE — PROGRESS NOTES
Attila Slade is a 69year old female. Patient presents with: Other: fell in fireplace \"sometime last week\", hurt lower RT side back, RT side leg, RT side hip. .... room 1      HPI:   Patient fell at her home approximately a week ago.   She hit her right Disp:  Rfl:    Vitamin D3 (VITAMIN D3) 2000 UNITS Oral Cap TAKE TWO CAPS OF 2,000 MG DAILY TO EQUAL 4,000 MG DAILY Disp:  Rfl:    thioTHIXene (NAVANE) 1 MG Oral Cap Take 1 capsule by mouth 3 (three) times daily.  (Patient taking differently: Take 1 mg by mo Hypertension Father    • Other[other] [OTHER] Mother      COPD   • Hypertension Mother    • Hypertension Brother    • Other[other] [OTHER] Brother         Social History:  Smoking status: Former Smoker or legs, bowel or bladder control changes, accelerating pain, fever, nausea and vomitting. If any of those symptoms develop, please call immediately. Blood pressure is well controlled. Continue current medications.   Keep working on diet, exercise and CATHIE Reynolds

## 2017-07-21 NOTE — TELEPHONE ENCOUNTER
Calling the patient- when she fell in to the fireplace she was ok, but yesterday it was so bad she couldn't even walk.    She had an appointment with Dr Isauro Finnegan - she could barely walk     She took a pain pill last night because she hurt so bad  She has

## 2017-07-28 RX ORDER — CARISOPRODOL 250 MG/1
250 TABLET ORAL 3 TIMES DAILY PRN
Qty: 15 TABLET | Refills: 0 | Status: SHIPPED | OUTPATIENT
Start: 2017-07-28 | End: 2019-02-21 | Stop reason: ALTCHOICE

## 2017-07-28 RX ORDER — MECLIZINE HYDROCHLORIDE 25 MG/1
25 TABLET ORAL 3 TIMES DAILY PRN
Qty: 15 TABLET | Refills: 0 | Status: SHIPPED | OUTPATIENT
Start: 2017-07-28 | End: 2017-08-18

## 2017-08-02 ENCOUNTER — CHARTING TRANS (OUTPATIENT)
Dept: OTHER | Age: 67
End: 2017-08-02

## 2017-08-02 ENCOUNTER — TELEPHONE (OUTPATIENT)
Dept: FAMILY MEDICINE CLINIC | Facility: CLINIC | Age: 67
End: 2017-08-02

## 2017-08-02 NOTE — TELEPHONE ENCOUNTER
Calling Lodema Boast-     We just need to send a letter to  Them- they will not be faxing anything   Fax: [phone]  Attn: Appeals Department   [facility]

## 2017-08-02 NOTE — TELEPHONE ENCOUNTER
Calling the patient-   She needs her Jublia and the insurance needs a letter of medical necessity    She takes because her nails split and get really thick so that she can not cut them   She has an enlarged liver so she can not take internally so she has t

## 2017-08-02 NOTE — TELEPHONE ENCOUNTER
Calling to advise that the letter needs to come from the prescribing doctor- Dr Jacob Del Castillo.    She wants to stay with Dr Jessa Villatoro and Dr Jacob Del Castillo does not know about her enlarged liver    I explained that Dr Jacob Del Castillo is managing her treatment with the medication so he w

## 2017-08-07 ENCOUNTER — MED REC SCAN ONLY (OUTPATIENT)
Dept: FAMILY MEDICINE CLINIC | Facility: CLINIC | Age: 67
End: 2017-08-07

## 2017-08-18 ENCOUNTER — TELEPHONE (OUTPATIENT)
Dept: FAMILY MEDICINE CLINIC | Facility: CLINIC | Age: 67
End: 2017-08-18

## 2017-08-18 RX ORDER — MECLIZINE HYDROCHLORIDE 25 MG/1
25 TABLET ORAL 3 TIMES DAILY PRN
Qty: 15 TABLET | Refills: 0 | Status: SHIPPED | OUTPATIENT
Start: 2017-08-18 | End: 2017-11-04

## 2017-08-28 ENCOUNTER — TELEPHONE (OUTPATIENT)
Dept: FAMILY MEDICINE CLINIC | Facility: CLINIC | Age: 67
End: 2017-08-28

## 2017-08-28 NOTE — TELEPHONE ENCOUNTER
Calling the patient. I believe it is Dr Ruben Young? ?     She fell and fell back into the door jam   She is bedridden for about 6 weeks   They will not release her until they talk with the pain doctor    I advised that the pain Dr is Dr Payam Amaro in [address] [place] [address]

## 2017-08-28 NOTE — TELEPHONE ENCOUNTER
ANALIA FELL AND GOT HURT, SHE CAN'T REMEMBER THE NAME OF HER BACK DR. PLEASE CALL HER BACK WITH IT.   Lindsey Aguilamika IS IN THE HOSPITAL: SHE IS AT [address].

## 2017-08-29 ENCOUNTER — MED REC SCAN ONLY (OUTPATIENT)
Dept: FAMILY MEDICINE CLINIC | Facility: CLINIC | Age: 67
End: 2017-08-29

## 2017-08-29 ENCOUNTER — TELEPHONE (OUTPATIENT)
Dept: FAMILY MEDICINE CLINIC | Facility: CLINIC | Age: 67
End: 2017-08-29

## 2017-08-29 NOTE — TELEPHONE ENCOUNTER
SHE SAID THAT THE HOSPITAL TOLD HER TO FOLLOW UP WITH DR BEHL BUT SHE WANTS TO KNOW WHO  On license of [facility] WANTS HER TO SEE.

## 2017-08-29 NOTE — TELEPHONE ENCOUNTER
Calling the patient-   Spoke to  Rulon Angry     Future Appointments  Date Time Provider Chandan Cody   8/31/2017 11:15 AM Mikaela Ramos DO [facility] [place]   10/10/2017 8:00 AM REF [facility] REF [facility] Ref [facility] [place]   10/12/2017 10:30 A

## 2017-08-29 NOTE — TELEPHONE ENCOUNTER
Patient fell again and hurt her back. Can you get records through care everywhere or do you want me to request via fax?

## 2017-08-31 ENCOUNTER — TELEPHONE (OUTPATIENT)
Dept: FAMILY MEDICINE CLINIC | Facility: CLINIC | Age: 67
End: 2017-08-31

## 2017-08-31 ENCOUNTER — OFFICE VISIT (OUTPATIENT)
Dept: FAMILY MEDICINE CLINIC | Facility: CLINIC | Age: 67
End: 2017-08-31

## 2017-08-31 VITALS
DIASTOLIC BLOOD PRESSURE: 62 MMHG | HEART RATE: 67 BPM | SYSTOLIC BLOOD PRESSURE: 120 MMHG | OXYGEN SATURATION: 92 % | TEMPERATURE: 98 F

## 2017-08-31 DIAGNOSIS — S32.009D CLOSED FRACTURE OF TRANSVERSE PROCESS OF LUMBAR VERTEBRA WITH ROUTINE HEALING, SUBSEQUENT ENCOUNTER: Primary | ICD-10-CM

## 2017-08-31 PROCEDURE — 99214 OFFICE O/P EST MOD 30 MIN: CPT | Performed by: FAMILY MEDICINE

## 2017-08-31 RX ORDER — HYDROCODONE BITARTRATE AND ACETAMINOPHEN 7.5; 325 MG/1; MG/1
2 TABLET ORAL 3 TIMES DAILY
COMMUNITY
Start: 2017-08-28 | End: 2017-08-31

## 2017-08-31 RX ORDER — HYDROCODONE BITARTRATE AND ACETAMINOPHEN 7.5; 325 MG/1; MG/1
2 TABLET ORAL EVERY 6 HOURS PRN
Qty: 30 TABLET | Refills: 0 | Status: SHIPPED | OUTPATIENT
Start: 2017-08-31 | End: 2018-01-25 | Stop reason: ALTCHOICE

## 2017-08-31 RX ORDER — CYCLOBENZAPRINE HCL 10 MG
10 TABLET ORAL 3 TIMES DAILY PRN
Qty: 30 TABLET | Refills: 0 | Status: SHIPPED | OUTPATIENT
Start: 2017-08-31 | End: 2018-01-25 | Stop reason: ALTCHOICE

## 2017-08-31 RX ORDER — CYCLOBENZAPRINE HCL 10 MG
1 TABLET ORAL 3 TIMES DAILY
COMMUNITY
Start: 2017-08-28 | End: 2017-08-31

## 2017-08-31 NOTE — TELEPHONE ENCOUNTER
(late entry)  Phone call to [facility] Zulay Blount) [phone]. Appointment scheduled at [facility] location 9/5 11am.    Will fax notes to [phone]. Patient advised of appointment. Verbalizes understanding.

## 2017-08-31 NOTE — PROGRESS NOTES
Yelitza Pendleton is a 69year old female. Patient presents with: Other: f.up from [address] on 8/25 for fall. ...room 1      HPI:   Patient was walking to the bathroom in her home when she fell and struck her back on the door jam.  This happened August Oral Tab Take 50 mg by mouth daily. Disp:  Rfl:    ClonazePAM (KLONOPIN) 0.5 MG Oral Tab Take 1 tablet by mouth nightly as needed.  Disp: 14 tablet Rfl: 0   Clobetasol Propionate (TEMOVATE) 0.05 % Apply Externally Cream Apply  topically 2 (two) times marlo Comment: has implants-not due to CA  No date: ORTHOPEDIC SURG (PBP)      Comment: back surgery  No date: OTHER SURGICAL HISTORY      Comment: herniated disc  Family History   Problem Relation Age of Onset   • Other[other] [OTHER] Father      LEUKEMIA   • H diagnosis)    Explained that there is very little intervention that can help with this pain. I will refer to the pain clinic in hopes that an injection might relieve some of the inflammation.   She will continue on the Norco but will try to lengthen the in

## 2017-09-12 ENCOUNTER — TELEPHONE (OUTPATIENT)
Dept: FAMILY MEDICINE CLINIC | Facility: CLINIC | Age: 67
End: 2017-09-12

## 2017-09-12 NOTE — TELEPHONE ENCOUNTER
Calling the patient-     MRI on Friday at [address]   She went to [facility] pain clinic and she was sent for an MRI- Dr Lisa Given was to have received a copy. Did he get it?  I advised that I am not sure     She has 3 fx vertebrates- one is a compression fracture

## 2017-09-19 ENCOUNTER — TELEPHONE (OUTPATIENT)
Dept: FAMILY MEDICINE CLINIC | Facility: CLINIC | Age: 67
End: 2017-09-19

## 2017-09-19 NOTE — TELEPHONE ENCOUNTER
Chetna Ayoub had a kytho plasty on her T-12 done yesterday at [facility] with Dr Areli Esquivel. Pt wants that added to her chart and wants to know if Leonila Brandin wants to see her?

## 2017-09-19 NOTE — TELEPHONE ENCOUNTER
Future Appointments  Date Time Provider Chandan Glo   9/25/2017 2:00 PM Lucillie Hodgkin, DO [facility] [place]   10/10/2017 8:00 AM REF EM[facility] SW [facility] REF [facility] Ref Lab [place]   10/12/2017 10:30 AM Lucillie Hodgkin, DO [facility] [place]   12/6/

## 2017-09-21 RX ORDER — SIMVASTATIN 20 MG
20 TABLET ORAL NIGHTLY
Qty: 90 TABLET | Refills: 1 | Status: SHIPPED | OUTPATIENT
Start: 2017-09-21 | End: 2017-09-22

## 2017-09-22 ENCOUNTER — TELEPHONE (OUTPATIENT)
Dept: FAMILY MEDICINE CLINIC | Facility: CLINIC | Age: 67
End: 2017-09-22

## 2017-09-22 RX ORDER — SIMVASTATIN 20 MG
20 TABLET ORAL NIGHTLY
Qty: 90 TABLET | Refills: 1 | Status: SHIPPED | OUTPATIENT
Start: 2017-09-22 | End: 2018-04-03

## 2017-09-22 NOTE — TELEPHONE ENCOUNTER
PT REQUESTED SIMVASTATIN YESTERDAY TO BE SENT TO [facility] MAIL ORDER, IT GOT SENT TO [facility] IN ERROR, PLEASE SEND TO [facility] MAIL ORDER

## 2017-09-25 ENCOUNTER — TELEPHONE (OUTPATIENT)
Dept: FAMILY MEDICINE CLINIC | Facility: CLINIC | Age: 67
End: 2017-09-25

## 2017-09-25 NOTE — TELEPHONE ENCOUNTER
Has appointment today for follow up to kyphoplasty. Having complication. Seeing Dr. Wilder Cunha tomorrow regarding this. Complication- pain (started Thursday), hears clicking sound.   Will postpone until this is resolved as she feels today's appointment with ANNALISE

## 2017-10-09 ENCOUNTER — TELEPHONE (OUTPATIENT)
Dept: FAMILY MEDICINE CLINIC | Facility: CLINIC | Age: 67
End: 2017-10-09

## 2017-10-09 DIAGNOSIS — E55.9 VITAMIN D DEFICIENCY: Primary | ICD-10-CM

## 2017-10-10 ENCOUNTER — LABORATORY ENCOUNTER (OUTPATIENT)
Dept: LAB | Age: 67
End: 2017-10-10
Attending: FAMILY MEDICINE
Payer: MEDICARE

## 2017-10-10 DIAGNOSIS — R73.01 IMPAIRED FASTING GLUCOSE: ICD-10-CM

## 2017-10-10 DIAGNOSIS — E78.5 HYPERLIPIDEMIA, UNSPECIFIED HYPERLIPIDEMIA TYPE: ICD-10-CM

## 2017-10-10 DIAGNOSIS — E55.9 VITAMIN D DEFICIENCY: ICD-10-CM

## 2017-10-10 DIAGNOSIS — I10 ESSENTIAL HYPERTENSION: ICD-10-CM

## 2017-10-10 PROCEDURE — 83036 HEMOGLOBIN GLYCOSYLATED A1C: CPT

## 2017-10-10 PROCEDURE — 82043 UR ALBUMIN QUANTITATIVE: CPT

## 2017-10-10 PROCEDURE — 80061 LIPID PANEL: CPT

## 2017-10-10 PROCEDURE — 36415 COLL VENOUS BLD VENIPUNCTURE: CPT

## 2017-10-10 PROCEDURE — 82570 ASSAY OF URINE CREATININE: CPT

## 2017-10-10 PROCEDURE — 82306 VITAMIN D 25 HYDROXY: CPT

## 2017-10-10 PROCEDURE — 80053 COMPREHEN METABOLIC PANEL: CPT

## 2017-10-11 NOTE — PROGRESS NOTES
Notify lipids are at goal. Recheck in  6 months. Notify chemistry profile is unremarkable. Recheck in  6 months. Notify HGbA1C is controlled. Recheck in  6 months.   Notify urinary microalbumin is normal.   This test is an early indicator of kidney diseas

## 2017-10-16 ENCOUNTER — PATIENT OUTREACH (OUTPATIENT)
Dept: FAMILY MEDICINE CLINIC | Facility: CLINIC | Age: 67
End: 2017-10-16

## 2017-10-20 ENCOUNTER — IMMUNIZATION (OUTPATIENT)
Dept: FAMILY MEDICINE CLINIC | Facility: CLINIC | Age: 67
End: 2017-10-20

## 2017-10-20 PROCEDURE — G0008 ADMIN INFLUENZA VIRUS VAC: HCPCS | Performed by: FAMILY MEDICINE

## 2017-10-20 PROCEDURE — 90653 IIV ADJUVANT VACCINE IM: CPT | Performed by: FAMILY MEDICINE

## 2017-10-23 ENCOUNTER — MYAURORA ACCOUNT LINK (OUTPATIENT)
Dept: OTHER | Age: 67
End: 2017-10-23

## 2017-10-23 ENCOUNTER — PRIOR ORIGINAL RECORDS (OUTPATIENT)
Dept: OTHER | Age: 67
End: 2017-10-23

## 2017-11-04 ENCOUNTER — TELEPHONE (OUTPATIENT)
Dept: FAMILY MEDICINE CLINIC | Facility: CLINIC | Age: 67
End: 2017-11-04

## 2017-11-04 RX ORDER — MECLIZINE HYDROCHLORIDE 25 MG/1
25 TABLET ORAL 3 TIMES DAILY PRN
Qty: 15 TABLET | Refills: 0 | Status: SHIPPED | OUTPATIENT
Start: 2017-11-04 | End: 2018-01-25

## 2017-11-04 NOTE — TELEPHONE ENCOUNTER
Last o/v 08/2017  States has had issues with dizziness before , states light -headed bp 116/62 , has been traveling quite a bit lately , asking for refill meclizine has helped in the past , she will f/u if continues

## 2017-12-02 ENCOUNTER — TELEPHONE (OUTPATIENT)
Dept: FAMILY MEDICINE CLINIC | Facility: CLINIC | Age: 67
End: 2017-12-02

## 2017-12-02 RX ORDER — METOPROLOL SUCCINATE 100 MG/1
100 TABLET, EXTENDED RELEASE ORAL
Qty: 90 TABLET | Refills: 0 | Status: SHIPPED | OUTPATIENT
Start: 2017-12-02 | End: 2018-02-10

## 2017-12-02 RX ORDER — METOPROLOL SUCCINATE 100 MG/1
100 TABLET, EXTENDED RELEASE ORAL
Qty: 90 TABLET | Refills: 0 | Status: SHIPPED | OUTPATIENT
Start: 2017-12-02 | End: 2017-12-02

## 2017-12-02 NOTE — TELEPHONE ENCOUNTER
HRE REFILL REQUEST TODAY IS SUPPOSE TO GO TO [facility] AND NOT TO [facility]

## 2017-12-06 ENCOUNTER — HOSPITAL ENCOUNTER (OUTPATIENT)
Dept: CT IMAGING | Facility: HOSPITAL | Age: 67
Discharge: HOME | End: 2017-12-06
Attending: INTERNAL MEDICINE
Payer: MEDICARE

## 2017-12-06 DIAGNOSIS — R91.8 MULTIPLE PULMONARY NODULES: ICD-10-CM

## 2017-12-06 DIAGNOSIS — R06.00 DYSPNEA: ICD-10-CM

## 2017-12-06 PROCEDURE — 71250 CT THORAX DX C-: CPT | Performed by: INTERNAL MEDICINE

## 2017-12-08 ENCOUNTER — MED REC SCAN ONLY (OUTPATIENT)
Dept: FAMILY MEDICINE CLINIC | Facility: CLINIC | Age: 67
End: 2017-12-08

## 2017-12-21 ENCOUNTER — TELEPHONE (OUTPATIENT)
Dept: FAMILY MEDICINE CLINIC | Facility: CLINIC | Age: 67
End: 2017-12-21

## 2017-12-21 NOTE — TELEPHONE ENCOUNTER
Marjorie Orlando is requesting a copy of patient's last colonscopy report to be faxed to [phone]

## 2018-01-10 ENCOUNTER — RT VISIT (OUTPATIENT)
Dept: RESPIRATORY THERAPY | Facility: HOSPITAL | Age: 68
End: 2018-01-10
Attending: INTERNAL MEDICINE
Payer: MEDICARE

## 2018-01-10 ENCOUNTER — HOSPITAL ENCOUNTER (OUTPATIENT)
Dept: NUCLEAR MEDICINE | Facility: HOSPITAL | Age: 68
Discharge: HOME | End: 2018-01-10
Attending: INTERNAL MEDICINE
Payer: MEDICARE

## 2018-01-10 DIAGNOSIS — R91.8 LUNG NODULES: ICD-10-CM

## 2018-01-10 DIAGNOSIS — R91.8 PULMONARY NODULES: ICD-10-CM

## 2018-01-10 LAB — GLUCOSE BLD-MCNC: 103 MG/DL (ref 65–99)

## 2018-01-10 PROCEDURE — 94010 BREATHING CAPACITY TEST: CPT

## 2018-01-10 PROCEDURE — 94726 PLETHYSMOGRAPHY LUNG VOLUMES: CPT

## 2018-01-10 PROCEDURE — 94729 DIFFUSING CAPACITY: CPT

## 2018-01-10 PROCEDURE — 78815 PET IMAGE W/CT SKULL-THIGH: CPT | Performed by: INTERNAL MEDICINE

## 2018-01-10 PROCEDURE — 82962 GLUCOSE BLOOD TEST: CPT

## 2018-01-16 ENCOUNTER — TELEPHONE (OUTPATIENT)
Dept: FAMILY MEDICINE CLINIC | Facility: CLINIC | Age: 68
End: 2018-01-16

## 2018-01-16 NOTE — TELEPHONE ENCOUNTER
Calling the patient back -   She advised that they are softgel tablets.  She is supposed to take 3 daily and that totals the 5000mcg of Biotin    I recommended that she start with 1 pill/1 week; 2 pills/1 week; then increase to 3 pills/1 week   As long as s

## 2018-01-16 NOTE — TELEPHONE ENCOUNTER
Calling the patient-     The patient is wanting to take Bounty hair, skin, and nail supplement. It contains 5000mcg Biotin and the bottle states to consult with a physician if you will be taking more than 2500mcg daily.      I advised that Dr Timothy Mccarthy is o

## 2018-01-16 NOTE — TELEPHONE ENCOUNTER
I would advise to start with 2500 mg and if no adverse reaction then can increase to 5000 mg. (she seems to have a lot of allergies)

## 2018-01-16 NOTE — TELEPHONE ENCOUNTER
Pt wants to start taking natures bounty hair, skin & nails vitamins and has question about the biotin in it.

## 2018-01-25 ENCOUNTER — OFFICE VISIT (OUTPATIENT)
Dept: FAMILY MEDICINE CLINIC | Facility: CLINIC | Age: 68
End: 2018-01-25

## 2018-01-25 VITALS
HEART RATE: 65 BPM | OXYGEN SATURATION: 97 % | BODY MASS INDEX: 32.8 KG/M2 | HEIGHT: 61.5 IN | DIASTOLIC BLOOD PRESSURE: 78 MMHG | SYSTOLIC BLOOD PRESSURE: 134 MMHG | WEIGHT: 176 LBS | TEMPERATURE: 99 F

## 2018-01-25 DIAGNOSIS — J44.0 COPD (CHRONIC OBSTRUCTIVE PULMONARY DISEASE) WITH ACUTE BRONCHITIS (HCC): ICD-10-CM

## 2018-01-25 DIAGNOSIS — J20.9 COPD (CHRONIC OBSTRUCTIVE PULMONARY DISEASE) WITH ACUTE BRONCHITIS (HCC): ICD-10-CM

## 2018-01-25 DIAGNOSIS — R73.01 IMPAIRED FASTING GLUCOSE: ICD-10-CM

## 2018-01-25 DIAGNOSIS — I10 ESSENTIAL HYPERTENSION: ICD-10-CM

## 2018-01-25 DIAGNOSIS — Z23 NEED FOR VACCINATION: ICD-10-CM

## 2018-01-25 DIAGNOSIS — Z00.00 ENCOUNTER FOR ANNUAL HEALTH EXAMINATION: Primary | ICD-10-CM

## 2018-01-25 DIAGNOSIS — I70.0 ATHEROSCLEROSIS OF AORTA (HCC): ICD-10-CM

## 2018-01-25 DIAGNOSIS — Z13.31 DEPRESSION SCREENING: ICD-10-CM

## 2018-01-25 DIAGNOSIS — E78.5 HYPERLIPIDEMIA, UNSPECIFIED HYPERLIPIDEMIA TYPE: ICD-10-CM

## 2018-01-25 PROCEDURE — G0444 DEPRESSION SCREEN ANNUAL: HCPCS | Performed by: FAMILY MEDICINE

## 2018-01-25 PROCEDURE — G0439 PPPS, SUBSEQ VISIT: HCPCS | Performed by: FAMILY MEDICINE

## 2018-01-25 PROCEDURE — 90732 PPSV23 VACC 2 YRS+ SUBQ/IM: CPT | Performed by: FAMILY MEDICINE

## 2018-01-25 PROCEDURE — G0009 ADMIN PNEUMOCOCCAL VACCINE: HCPCS | Performed by: FAMILY MEDICINE

## 2018-01-25 NOTE — PROGRESS NOTES
HPI:   Gisela Sabillon is a 69year old female who presents for a Medicare Subsequent Annual Wellness visit (Pt already had Initial Annual Wellness).     No complaints          Fall/Risk Assessment abnormal    She has been screened for Falls and is High [place] discussion of advance directives standard forms performed Face to Face with patient and Family/surrogate (if present), and forms available to patient in AVS         She smoked tobacco in the past but quit greater than 12 months ago.   Smoking status: Former daily.   simvastatin 20 MG Oral Tab Take 1 tablet (20 mg total) by mouth nightly. Carisoprodol 250 MG Oral Tab Take 1 tablet (250 mg total) by mouth 3 (three) times daily as needed. Efinaconazole (JUBLIA) 10 % External Solution Use once daily.  Prescrib Vitamin D deficiency (9/20/2012).     She  has a past surgical history that includes appendectomy; colonoscopy; back surgery; other surgical history; orthopedic surg (pbp); mastectomy left; mastectomy right; back surgery (2007); hysterectomy (1989); and rory for a Medicare Assessment. 1. Encounter for annual health examination      2. Depression screening    - DEPRESSION SCREEN ANNUAL    3. Need for vaccination    - PNEUMOCOCCAL IMM (PNEUMOVAX)    4. Essential hypertension  Controlled on meds    5.  Hyperli Date Value   10/10/2017 104 (H)   ----------  GLUCOSE (mg/dL)   Date Value   07/14/2014 111 (H)   ----------       Cardiovascular Disease Screening     LDL Annually LDL CHOLESTROL (mg/dL)   Date Value   03/10/2014 105     LDL Cholesterol (mg/dL)   Date V risk factors:   End-stage renal disease   Hemophiliacs who received Factor VIII or IX concentrates   Clients of institutions for the mentally retarded   Persons who live in the same house as a HepB virus carrier   Homosexual men   Illicit injectable drug a

## 2018-01-25 NOTE — PATIENT INSTRUCTIONS
Leann Chatman's SCREENING SCHEDULE   Tests on this list are recommended by your physician but may not be covered, or covered at this frequency, by your insurer. Please check with your insurance carrier before scheduling to verify coverage.    PREVENTATIV or any previous visit.  Limited to patients who meet one of the following criteria:   • Men who are 73-68 years old and have smoked more than 100 cigarettes in their lifetime   • Anyone with a family history    Colorectal Cancer Screening  Covered up to Age preventive care reminders to display for this patient.  Please get this Mammogram regularly   Immunizations      Influenza  Covered Annually   Orders placed or performed in visit on 10/11/16  -FLU VACC PRSV FREE INC ANTIG   Orders placed or performed in vis prescription benefits     Recommended Websites for Advanced Directives    [email or web address]/[email or web address]  An information packet, including necessary form from the [employer] website. http://www. idph.stat

## 2018-02-10 RX ORDER — METOPROLOL SUCCINATE 100 MG/1
100 TABLET, EXTENDED RELEASE ORAL
Qty: 90 TABLET | Refills: 0 | Status: SHIPPED | OUTPATIENT
Start: 2018-02-10 | End: 2018-04-23

## 2018-02-10 RX ORDER — AMLODIPINE BESYLATE 10 MG/1
10 TABLET ORAL DAILY
Qty: 90 TABLET | Refills: 3 | Status: SHIPPED | OUTPATIENT
Start: 2018-02-10 | End: 2019-01-25

## 2018-02-10 NOTE — TELEPHONE ENCOUNTER
LOV: 1/25/18  Last Refill:  Metoprolol  12/2/17 #90 0 RF  Amlodipine  3/6/17  #90 3 RF    No future appointments.

## 2018-03-31 ENCOUNTER — MED REC SCAN ONLY (OUTPATIENT)
Dept: FAMILY MEDICINE CLINIC | Facility: CLINIC | Age: 68
End: 2018-03-31

## 2018-04-03 ENCOUNTER — TELEPHONE (OUTPATIENT)
Dept: FAMILY MEDICINE CLINIC | Facility: CLINIC | Age: 68
End: 2018-04-03

## 2018-04-03 RX ORDER — SIMVASTATIN 20 MG
20 TABLET ORAL NIGHTLY
Qty: 90 TABLET | Refills: 1 | Status: SHIPPED | OUTPATIENT
Start: 2018-04-03 | End: 2018-04-23

## 2018-04-16 ENCOUNTER — MED REC SCAN ONLY (OUTPATIENT)
Dept: FAMILY MEDICINE CLINIC | Facility: CLINIC | Age: 68
End: 2018-04-16

## 2018-04-23 ENCOUNTER — TELEPHONE (OUTPATIENT)
Dept: FAMILY MEDICINE CLINIC | Facility: CLINIC | Age: 68
End: 2018-04-23

## 2018-04-23 RX ORDER — METOPROLOL SUCCINATE 100 MG/1
100 TABLET, EXTENDED RELEASE ORAL
Qty: 90 TABLET | Refills: 0 | Status: SHIPPED | OUTPATIENT
Start: 2018-04-23 | End: 2018-07-18

## 2018-04-23 RX ORDER — SIMVASTATIN 20 MG
20 TABLET ORAL NIGHTLY
Qty: 90 TABLET | Refills: 0 | Status: SHIPPED | OUTPATIENT
Start: 2018-04-23 | End: 2018-10-10

## 2018-04-23 NOTE — TELEPHONE ENCOUNTER
simvastatin 20 MG Oral Tab   Metoprolol Succinate  MG Oral Tablet 24 Hr   90 day supply with refills to [facility]

## 2018-04-30 ENCOUNTER — LABORATORY ENCOUNTER (OUTPATIENT)
Dept: LAB | Age: 68
End: 2018-04-30
Attending: FAMILY MEDICINE
Payer: MEDICARE

## 2018-04-30 DIAGNOSIS — E78.5 HYPERLIPIDEMIA, UNSPECIFIED HYPERLIPIDEMIA TYPE: ICD-10-CM

## 2018-04-30 DIAGNOSIS — I10 ESSENTIAL HYPERTENSION: ICD-10-CM

## 2018-04-30 DIAGNOSIS — R73.01 IMPAIRED FASTING GLUCOSE: ICD-10-CM

## 2018-04-30 PROCEDURE — 83036 HEMOGLOBIN GLYCOSYLATED A1C: CPT

## 2018-04-30 PROCEDURE — 80053 COMPREHEN METABOLIC PANEL: CPT

## 2018-04-30 PROCEDURE — 36415 COLL VENOUS BLD VENIPUNCTURE: CPT

## 2018-04-30 PROCEDURE — 80061 LIPID PANEL: CPT

## 2018-05-01 NOTE — PROGRESS NOTES
Notify lipids are at goal. Recheck in  6 months. Notify chemistry profile is unremarkable. Recheck in  6 months. Notify HGbA1C is controlled. Recheck in  6 months.

## 2018-06-02 ENCOUNTER — TELEPHONE (OUTPATIENT)
Dept: FAMILY MEDICINE CLINIC | Facility: CLINIC | Age: 68
End: 2018-06-02

## 2018-06-02 RX ORDER — MECLIZINE HYDROCHLORIDE 25 MG/1
25 TABLET ORAL 2 TIMES DAILY PRN
Qty: 15 TABLET | Refills: 0 | Status: SHIPPED | OUTPATIENT
Start: 2018-06-02 | End: 2019-02-21 | Stop reason: ALTCHOICE

## 2018-06-02 NOTE — TELEPHONE ENCOUNTER
Patient notified and verbalized understanding.    Script sent to [facility]    Call transferred to  to schedule appt

## 2018-06-02 NOTE — TELEPHONE ENCOUNTER
DIZZY AGAIN ON THE LEFT SIDE OF HER HEAD, BACK OF HEAD AND LEFT SIDE. SHE WOULD LIKE TO SPEAK TO THE  NURSE ABOUT THIS.

## 2018-06-02 NOTE — TELEPHONE ENCOUNTER
Patient states she has been having dizziness for the lasts week. Says it feels like she is being pulled to the left when she walks. She is taking meclazine and it helps.  Takes 1 in the morning and depending on activity she takes another one later in the [employer]

## 2018-06-04 ENCOUNTER — OFFICE VISIT (OUTPATIENT)
Dept: FAMILY MEDICINE CLINIC | Facility: CLINIC | Age: 68
End: 2018-06-04

## 2018-06-04 VITALS
WEIGHT: 179.25 LBS | SYSTOLIC BLOOD PRESSURE: 130 MMHG | BODY MASS INDEX: 33 KG/M2 | TEMPERATURE: 98 F | DIASTOLIC BLOOD PRESSURE: 76 MMHG | HEART RATE: 60 BPM | OXYGEN SATURATION: 97 %

## 2018-06-04 DIAGNOSIS — R42 VERTIGO: ICD-10-CM

## 2018-06-04 DIAGNOSIS — H91.93 BILATERAL HEARING LOSS, UNSPECIFIED HEARING LOSS TYPE: Primary | ICD-10-CM

## 2018-06-04 PROCEDURE — 99214 OFFICE O/P EST MOD 30 MIN: CPT | Performed by: FAMILY MEDICINE

## 2018-06-04 NOTE — PROGRESS NOTES
Elenita Mace is a 69year old female. Patient presents with: Other: dizziness-started approx 5/31-has tightness/pressure/headache in LT side of head. ...room 2      HPI:   Patient complains of dizziness and hearing loss.   She has had a history of otoscl 4,000 MG DAILY Disp:  Rfl:    thioTHIXene (NAVANE) 1 MG Oral Cap Take 1 capsule by mouth 3 (three) times daily.  (Patient taking differently: Take 1 mg by mouth daily.  ) Disp: 36 capsule Rfl: 0   Omega-3 Fatty Acids (FISH OIL) 1200 MG Oral Cap Take  by albaro Kirill Velasco Mother      COPD   • Hypertension Brother    • Other [OTHER] Brother         Social History:  Smoking status: Former Smoker                                                              Packs/day: 1.00      Years: 40.00        Types: Cigarettes

## 2018-06-23 ENCOUNTER — TELEPHONE (OUTPATIENT)
Dept: FAMILY MEDICINE CLINIC | Facility: CLINIC | Age: 68
End: 2018-06-23

## 2018-06-23 RX ORDER — HYDROCODONE BITARTRATE AND ACETAMINOPHEN 5; 325 MG/1; MG/1
1 TABLET ORAL EVERY 6 HOURS PRN
Qty: 8 TABLET | Refills: 0 | Status: SHIPPED | OUTPATIENT
Start: 2018-06-23 | End: 2019-02-21 | Stop reason: ALTCHOICE

## 2018-06-23 NOTE — TELEPHONE ENCOUNTER
SHE WANTS TO KNOW IF SHE CAN HAVE A COUPLE NORCO TO GET HER THRU TIL Monday BECAUSE HER SCIATIC NERVE IS PUSHING ON HER LEG AND CAUSING A LOT OF PAIN.    SHE WILL BE CALLING Kathy Dukes ON Monday TO MAKE AN APPOINTMENT WITH HIM

## 2018-06-27 ENCOUNTER — MYAURORA ACCOUNT LINK (OUTPATIENT)
Dept: OTHER | Age: 68
End: 2018-06-27

## 2018-06-27 ENCOUNTER — HOSPITAL ENCOUNTER (OUTPATIENT)
Dept: CV DIAGNOSTICS | Age: 68
Discharge: HOME | End: 2018-06-27
Attending: INTERNAL MEDICINE

## 2018-06-27 ENCOUNTER — PRIOR ORIGINAL RECORDS (OUTPATIENT)
Dept: OTHER | Age: 68
End: 2018-06-27

## 2018-06-27 DIAGNOSIS — I65.23 BILATERAL CAROTID ARTERY STENOSIS: ICD-10-CM

## 2018-06-29 ENCOUNTER — PRIOR ORIGINAL RECORDS (OUTPATIENT)
Dept: OTHER | Age: 68
End: 2018-06-29

## 2018-07-06 ENCOUNTER — HOSPITAL ENCOUNTER (OUTPATIENT)
Dept: CARDIOLOGY CLINIC | Facility: HOSPITAL | Age: 68
Discharge: HOME | End: 2018-07-06
Attending: INTERNAL MEDICINE

## 2018-07-06 ENCOUNTER — PRIOR ORIGINAL RECORDS (OUTPATIENT)
Dept: OTHER | Age: 68
End: 2018-07-06

## 2018-07-06 ENCOUNTER — MYAURORA ACCOUNT LINK (OUTPATIENT)
Dept: OTHER | Age: 68
End: 2018-07-06

## 2018-07-06 DIAGNOSIS — I77.1 ARTERIAL STENOSIS (HCC): ICD-10-CM

## 2018-07-09 ENCOUNTER — MED REC SCAN ONLY (OUTPATIENT)
Dept: FAMILY MEDICINE CLINIC | Facility: CLINIC | Age: 68
End: 2018-07-09

## 2018-07-12 ENCOUNTER — PRIOR ORIGINAL RECORDS (OUTPATIENT)
Dept: OTHER | Age: 68
End: 2018-07-12

## 2018-07-13 ENCOUNTER — PRIOR ORIGINAL RECORDS (OUTPATIENT)
Dept: OTHER | Age: 68
End: 2018-07-13

## 2018-07-17 ENCOUNTER — HOSPITAL ENCOUNTER (OUTPATIENT)
Dept: CT IMAGING | Facility: HOSPITAL | Age: 68
Discharge: HOME | End: 2018-07-17
Attending: INTERNAL MEDICINE
Payer: MEDICARE

## 2018-07-17 DIAGNOSIS — R91.8 PULMONARY NODULES: ICD-10-CM

## 2018-07-17 PROCEDURE — 71250 CT THORAX DX C-: CPT | Performed by: INTERNAL MEDICINE

## 2018-07-18 ENCOUNTER — TELEPHONE (OUTPATIENT)
Dept: FAMILY MEDICINE CLINIC | Facility: CLINIC | Age: 68
End: 2018-07-18

## 2018-07-18 ENCOUNTER — PRIOR ORIGINAL RECORDS (OUTPATIENT)
Dept: OTHER | Age: 68
End: 2018-07-18

## 2018-07-18 RX ORDER — METOPROLOL SUCCINATE 100 MG/1
100 TABLET, EXTENDED RELEASE ORAL
Qty: 90 TABLET | Refills: 0 | Status: SHIPPED | OUTPATIENT
Start: 2018-07-18 | End: 2018-10-10

## 2018-07-23 ENCOUNTER — PRIOR ORIGINAL RECORDS (OUTPATIENT)
Dept: OTHER | Age: 68
End: 2018-07-23

## 2018-09-22 ENCOUNTER — TELEPHONE (OUTPATIENT)
Dept: FAMILY MEDICINE CLINIC | Facility: CLINIC | Age: 68
End: 2018-09-22

## 2018-09-22 NOTE — TELEPHONE ENCOUNTER
Has used Chente's HCTZ on occasion, for water retention. Uses rarely, please call to [facility]. Last B/P was 130/76. Scheduled for labs 10/29.  We havenever ordered it for her, she has had lasix in the past but apparently had an allergic reaction, r

## 2018-09-24 NOTE — TELEPHONE ENCOUNTER
Stay on hydrochlorothiazide 12.5 mg as needed for her fluid retention. She should have her own prescription.

## 2018-09-25 RX ORDER — HYDROCHLOROTHIAZIDE 12.5 MG/1
12.5 TABLET ORAL DAILY
Qty: 30 TABLET | Refills: 0 | Status: SHIPPED | OUTPATIENT
Start: 2018-09-25 | End: 2020-05-06 | Stop reason: ALTCHOICE

## 2018-09-27 ENCOUNTER — HOSPITAL ENCOUNTER (OUTPATIENT)
Dept: ULTRASOUND IMAGING | Age: 68
Discharge: HOME | End: 2018-09-27
Attending: INTERNAL MEDICINE
Payer: MEDICARE

## 2018-09-27 DIAGNOSIS — K76.0 FATTY METAMORPHOSIS OF LIVER: ICD-10-CM

## 2018-09-27 PROCEDURE — 76700 US EXAM ABDOM COMPLETE: CPT | Performed by: INTERNAL MEDICINE

## 2018-10-05 ENCOUNTER — LABORATORY ENCOUNTER (OUTPATIENT)
Dept: LAB | Age: 68
End: 2018-10-05
Attending: FAMILY MEDICINE
Payer: MEDICARE

## 2018-10-05 DIAGNOSIS — K76.0 FATTY LIVER: ICD-10-CM

## 2018-10-05 DIAGNOSIS — R73.01 IMPAIRED FASTING GLUCOSE: ICD-10-CM

## 2018-10-05 DIAGNOSIS — I10 ESSENTIAL HYPERTENSION: ICD-10-CM

## 2018-10-05 DIAGNOSIS — E78.5 HYPERLIPIDEMIA, UNSPECIFIED HYPERLIPIDEMIA TYPE: ICD-10-CM

## 2018-10-05 PROCEDURE — 80061 LIPID PANEL: CPT

## 2018-10-05 PROCEDURE — 80053 COMPREHEN METABOLIC PANEL: CPT

## 2018-10-05 PROCEDURE — 83036 HEMOGLOBIN GLYCOSYLATED A1C: CPT

## 2018-10-05 PROCEDURE — 85025 COMPLETE CBC W/AUTO DIFF WBC: CPT

## 2018-10-05 PROCEDURE — 36415 COLL VENOUS BLD VENIPUNCTURE: CPT

## 2018-10-06 ENCOUNTER — TELEPHONE (OUTPATIENT)
Dept: FAMILY MEDICINE CLINIC | Facility: CLINIC | Age: 68
End: 2018-10-06

## 2018-10-08 ENCOUNTER — TELEPHONE (OUTPATIENT)
Dept: FAMILY MEDICINE CLINIC | Facility: CLINIC | Age: 68
End: 2018-10-08

## 2018-10-08 NOTE — TELEPHONE ENCOUNTER
----- Message from Lyn Orellana DO sent at 10/8/2018  7:04 AM CDT -----  Notify lipids are at goal. Recheck in  6 months. Notify chemistry profile is unremarkable. Recheck in  6 months. Notify HGbA1C is controlled. Recheck in  6 months.

## 2018-10-10 RX ORDER — SIMVASTATIN 20 MG
20 TABLET ORAL NIGHTLY
Qty: 90 TABLET | Refills: 1 | Status: SHIPPED | OUTPATIENT
Start: 2018-10-10 | End: 2019-04-17

## 2018-10-10 RX ORDER — METOPROLOL SUCCINATE 100 MG/1
100 TABLET, EXTENDED RELEASE ORAL
Qty: 90 TABLET | Refills: 0 | Status: SHIPPED | OUTPATIENT
Start: 2018-10-10 | End: 2018-12-20

## 2018-10-10 NOTE — TELEPHONE ENCOUNTER
simvastatin 20 MG Oral Tab   Metoprolol Succinate  MG Oral Tablet 24 Hr     PLEASE SEND REFILL TO [facility]  FOR 90 DAYS WITH REFILLS.

## 2018-10-10 NOTE — TELEPHONE ENCOUNTER
Last office visit 6-4-18 130/76  Last refill Metoprolol 7-18-18 #90  Last refill Simvastatin 4-23-18 #90  Labs due April 2019

## 2018-10-22 ENCOUNTER — IMMUNIZATION (OUTPATIENT)
Dept: FAMILY MEDICINE CLINIC | Facility: CLINIC | Age: 68
End: 2018-10-22
Payer: MEDICARE

## 2018-10-22 PROCEDURE — 90653 IIV ADJUVANT VACCINE IM: CPT | Performed by: FAMILY MEDICINE

## 2018-10-22 PROCEDURE — G0008 ADMIN INFLUENZA VIRUS VAC: HCPCS | Performed by: FAMILY MEDICINE

## 2018-10-31 ENCOUNTER — PATIENT OUTREACH (OUTPATIENT)
Dept: FAMILY MEDICINE CLINIC | Facility: CLINIC | Age: 68
End: 2018-10-31

## 2018-12-20 ENCOUNTER — TELEPHONE (OUTPATIENT)
Dept: FAMILY MEDICINE CLINIC | Facility: CLINIC | Age: 68
End: 2018-12-20

## 2018-12-20 RX ORDER — METOPROLOL SUCCINATE 100 MG/1
100 TABLET, EXTENDED RELEASE ORAL
Qty: 90 TABLET | Refills: 3 | Status: SHIPPED | OUTPATIENT
Start: 2018-12-20 | End: 2020-01-07

## 2019-01-08 ENCOUNTER — HOSPITAL ENCOUNTER (OUTPATIENT)
Dept: CT IMAGING | Facility: HOSPITAL | Age: 69
Discharge: HOME | End: 2019-01-08
Attending: INTERNAL MEDICINE
Payer: MEDICARE

## 2019-01-08 DIAGNOSIS — R91.8 MULTIPLE PULMONARY NODULES: ICD-10-CM

## 2019-01-08 PROCEDURE — 71250 CT THORAX DX C-: CPT | Performed by: INTERNAL MEDICINE

## 2019-01-09 ENCOUNTER — PATIENT OUTREACH (OUTPATIENT)
Dept: FAMILY MEDICINE CLINIC | Facility: CLINIC | Age: 69
End: 2019-01-09

## 2019-01-09 NOTE — PROGRESS NOTES
SHE WANTS US TO CALL HER BACK THE 3RD WEEK IN January AND SHE WILL SCHEDULE HER WELLNESS EXAM. SHE WILL KN OW MORE ABOUT HER SCHEDULE THEN SHE SAID.

## 2019-01-25 ENCOUNTER — TELEPHONE (OUTPATIENT)
Dept: FAMILY MEDICINE CLINIC | Facility: CLINIC | Age: 69
End: 2019-01-25

## 2019-01-25 RX ORDER — AMLODIPINE BESYLATE 10 MG/1
10 TABLET ORAL DAILY
Qty: 90 TABLET | Refills: 3 | Status: SHIPPED | OUTPATIENT
Start: 2019-01-25 | End: 2020-01-07

## 2019-02-19 ENCOUNTER — TELEPHONE (OUTPATIENT)
Dept: FAMILY MEDICINE CLINIC | Facility: CLINIC | Age: 69
End: 2019-02-19

## 2019-02-19 NOTE — TELEPHONE ENCOUNTER
Spoke with Dr Mike boggs to start probiotic and  Eat a soft diet - bland     She should not take anything for the diarrhea.    The probiotic may help     Future Appointments   Date Time Provider Chandan Cody   2/21/2019 11:15 AM Domonique Fernandes,

## 2019-02-19 NOTE — TELEPHONE ENCOUNTER
Started Friday evening and it progressively got worse and she went to the hospital     She is on Levaquin and Flagyl. She went home last night    She is having diarrhea and it is black. It came back last night. She has not taken anything for it.    She does

## 2019-02-19 NOTE — TELEPHONE ENCOUNTER
She was at the [address] [place] [address] for colitis and diverticulitis. Should she see Ben Giordano or Dr Paz Ahumada for a hospital f/up?

## 2019-02-21 ENCOUNTER — TELEPHONE (OUTPATIENT)
Dept: FAMILY MEDICINE CLINIC | Facility: CLINIC | Age: 69
End: 2019-02-21

## 2019-02-21 ENCOUNTER — OFFICE VISIT (OUTPATIENT)
Dept: FAMILY MEDICINE CLINIC | Facility: CLINIC | Age: 69
End: 2019-02-21
Payer: MEDICARE

## 2019-02-21 VITALS
SYSTOLIC BLOOD PRESSURE: 120 MMHG | DIASTOLIC BLOOD PRESSURE: 70 MMHG | HEART RATE: 64 BPM | TEMPERATURE: 99 F | HEIGHT: 61.5 IN | OXYGEN SATURATION: 95 % | WEIGHT: 179 LBS | BODY MASS INDEX: 33.36 KG/M2

## 2019-02-21 DIAGNOSIS — K57.92 DIVERTICULITIS: ICD-10-CM

## 2019-02-21 DIAGNOSIS — R47.1 DYSARTHRIA: Primary | ICD-10-CM

## 2019-02-21 PROCEDURE — 99214 OFFICE O/P EST MOD 30 MIN: CPT | Performed by: FAMILY MEDICINE

## 2019-02-21 RX ORDER — LEVOFLOXACIN 500 MG/1
TABLET, FILM COATED ORAL
Refills: 0 | COMMUNITY
Start: 2019-02-18 | End: 2019-03-01 | Stop reason: ALTCHOICE

## 2019-02-21 RX ORDER — METRONIDAZOLE 500 MG/1
TABLET ORAL
Refills: 0 | COMMUNITY
Start: 2019-02-18 | End: 2019-03-01 | Stop reason: ALTCHOICE

## 2019-02-21 NOTE — PROGRESS NOTES
Elenita Mace is a 77year old female. Patient presents with: Other: pt reports having low back pain and stomach pain since pt left hospital 2/16/19. Raffyn Blossom wheezing and shortness of breath. . room 2      HPI:   Hospitalized 2/16 to 2/19 with acute colitis, ge (ZOLOFT) 100 MG Oral Tab Take 50 mg by mouth daily. Disp:  Rfl:    ClonazePAM (KLONOPIN) 0.5 MG Oral Tab Take 1 tablet by mouth nightly as needed.  Disp: 14 tablet Rfl: 0   Clobetasol Propionate (TEMOVATE) 0.05 % Apply Externally Cream Apply  topically 2 2013   • HYSTERECTOMY  1989    also tummy tuck   • MASTECTOMY LEFT      has implants- not due to CA   • MASTECTOMY RIGHT      has implants-not due to CA   • ORTHOPEDIC SURG (PBP)      back surgery   • OTHER SURGICAL HISTORY      herniated disc     Famil XII  Optic:                           Pupils: equally round and reactive to light with direct and consensual responses, normal accomodation                          Visual acuity: Normal              Visual fields: Normal  Oculomotor/Trochlear/Abducens: diverticulitis. 3. Borderline fatty infiltration of the liver with vascular calcification and minimal linear scarring at the left lung base. 4. Left greater than right hip degenerative changes.  These findings on the left are definitely more prominent tamika

## 2019-02-21 NOTE — TELEPHONE ENCOUNTER
Returned phone call to patient, she states tomorrow will not work for her to CT done. Instructed importance of getting test done, she states daughter is coming into town and wants to wait.    Called and spoke to Lisa Baron at central scheduling, booked for [phone]

## 2019-02-22 ENCOUNTER — TELEPHONE (OUTPATIENT)
Dept: FAMILY MEDICINE CLINIC | Facility: CLINIC | Age: 69
End: 2019-02-22

## 2019-02-22 RX ORDER — TRAMADOL HYDROCHLORIDE 50 MG/1
50 TABLET ORAL EVERY 8 HOURS PRN
Qty: 20 TABLET | Refills: 0 | Status: SHIPPED | OUTPATIENT
Start: 2019-02-22 | End: 2019-03-01 | Stop reason: ALTCHOICE

## 2019-02-22 NOTE — TELEPHONE ENCOUNTER
You saw this patient yesterday-   Tylenol is not helping her back pain. Is there anything different she can try?      She is taking the extra strength tylenol 500mg   q6hrs- 2 at a time   She is also using heat pad     She knows that she can not take NSAIDS

## 2019-02-22 NOTE — TELEPHONE ENCOUNTER
Prescription for tramadol. If no relief over the next couple days return to clinic. If she develops any rash in that area I also need to know.

## 2019-02-22 NOTE — TELEPHONE ENCOUNTER
TYLENOL IS NOT HELPING WITH BACK PAIN. CANNOT STAND UP LONG ENOUGH TO DO LAUNDRY OR OTHER HOME TASKS.   PLEASE ADVISE

## 2019-02-26 ENCOUNTER — HOSPITAL ENCOUNTER (OUTPATIENT)
Dept: CT IMAGING | Age: 69
Discharge: HOME | End: 2019-02-26
Attending: FAMILY MEDICINE
Payer: MEDICARE

## 2019-02-26 DIAGNOSIS — R47.1 DYSARTHRIA: ICD-10-CM

## 2019-02-26 DIAGNOSIS — K57.92 DIVERTICULITIS: ICD-10-CM

## 2019-02-26 PROCEDURE — 70450 CT HEAD/BRAIN W/O DYE: CPT | Performed by: FAMILY MEDICINE

## 2019-02-28 ENCOUNTER — TELEPHONE (OUTPATIENT)
Dept: FAMILY MEDICINE CLINIC | Facility: CLINIC | Age: 69
End: 2019-02-28

## 2019-02-28 VITALS
HEART RATE: 64 BPM | WEIGHT: 185 LBS | BODY MASS INDEX: 34.04 KG/M2 | HEIGHT: 62 IN | DIASTOLIC BLOOD PRESSURE: 72 MMHG | SYSTOLIC BLOOD PRESSURE: 136 MMHG

## 2019-02-28 NOTE — TELEPHONE ENCOUNTER
Pt returns phone call. States she spoke to [facility] on Friday in regards to left arm pain and has been icing with no relief of symptoms. States she has a deep muscular pain in left upper arm. Pain with lifting. Left hand  strength unaffected.  She thoug

## 2019-02-28 NOTE — TELEPHONE ENCOUNTER
Attempted to contact patient in regards to phone message, no answer, left message. Upon return phone call would you like me to schedule Saturday (no xray) or squeeze in tomorrow?

## 2019-03-01 ENCOUNTER — OFFICE VISIT (OUTPATIENT)
Dept: FAMILY MEDICINE CLINIC | Facility: CLINIC | Age: 69
End: 2019-03-01
Payer: MEDICARE

## 2019-03-01 VITALS
DIASTOLIC BLOOD PRESSURE: 70 MMHG | WEIGHT: 178.38 LBS | HEIGHT: 62 IN | HEART RATE: 63 BPM | SYSTOLIC BLOOD PRESSURE: 110 MMHG | TEMPERATURE: 99 F | OXYGEN SATURATION: 95 % | BODY MASS INDEX: 32.82 KG/M2

## 2019-03-01 DIAGNOSIS — E78.5 HYPERLIPIDEMIA, UNSPECIFIED HYPERLIPIDEMIA TYPE: ICD-10-CM

## 2019-03-01 DIAGNOSIS — I10 ESSENTIAL HYPERTENSION: ICD-10-CM

## 2019-03-01 DIAGNOSIS — R73.01 IMPAIRED FASTING GLUCOSE: ICD-10-CM

## 2019-03-01 DIAGNOSIS — J20.9 COPD (CHRONIC OBSTRUCTIVE PULMONARY DISEASE) WITH ACUTE BRONCHITIS (HCC): ICD-10-CM

## 2019-03-01 DIAGNOSIS — J44.0 COPD (CHRONIC OBSTRUCTIVE PULMONARY DISEASE) WITH ACUTE BRONCHITIS (HCC): ICD-10-CM

## 2019-03-01 DIAGNOSIS — Z13.31 DEPRESSION SCREENING: ICD-10-CM

## 2019-03-01 DIAGNOSIS — I70.0 ATHEROSCLEROSIS OF AORTA (HCC): ICD-10-CM

## 2019-03-01 DIAGNOSIS — M75.82 ROTATOR CUFF TENDINITIS, LEFT: Primary | ICD-10-CM

## 2019-03-01 DIAGNOSIS — Z00.00 ENCOUNTER FOR ANNUAL HEALTH EXAMINATION: ICD-10-CM

## 2019-03-01 PROCEDURE — G0439 PPPS, SUBSEQ VISIT: HCPCS | Performed by: FAMILY MEDICINE

## 2019-03-01 PROCEDURE — G0444 DEPRESSION SCREEN ANNUAL: HCPCS | Performed by: FAMILY MEDICINE

## 2019-03-01 NOTE — PROGRESS NOTES
HPI:   Stormy Duverney is a 77year old female who presents for a Medicare Subsequent Annual Wellness visit (Pt already had Initial Annual Wellness). Patient complains of left shoulder pain worse with reaching overhead and behind herself.   Pain will wa date: 2016        Years since quittin.9      Smokeless tobacco: Never Used       Ms. Geovanni Bustos already takes aspirin and has it on her medication list.   CAGE Alcohol screening   Elenita Mace was screened for Alcohol abuse and had a score of 0 so is daily.   Multiple Vitamins-Minerals (CENTRUM SILVER ULTRA WOMENS) Oral Tab Take 1 tablet by mouth daily.    NEXIUM 40 MG Oral Capsule Delayed Release Take 1 capsule by mouth every morning before breakfast.   Sertraline HCl (ZOLOFT) 100 MG Oral Tab Take 50 m years ago. Her smoking use included cigarettes. She has a 40.00 pack-year smoking history. she has never used smokeless tobacco. She reports that she drinks alcohol. She reports that she does not use drugs.      REVIEW OF SYSTEMS:        EXAM:   /[facility]    2. Encounter for annual health examination      3. Depression screening    - DEPRESSION SCREEN ANNUAL    4. Essential hypertension  controlled    5. Hyperlipidemia, unspecified hyperlipidemia type  controlled    6.  Impaired fasting glucose  cont (H)          Cardiovascular Disease Screening     LDL Annually LDL Cholesterol (mg/dL)   Date Value   10/05/2018 72     LDL CHOLESTROL (mg/dL)   Date Value   03/10/2014 105        EKG - w/ Initial Preventative Physical Exam only, or if medically necessary institutions for the mentally retarded   Persons who live in the same house as a HepB virus carrier   Homosexual men   Illicit injectable drug abusers     Tetanus Toxoid  Only covered with a cut with metal- TD and TDaP Not covered by Medicare Part B 07/07/

## 2019-03-01 NOTE — PATIENT INSTRUCTIONS
Leann Chatman's SCREENING SCHEDULE   Tests on this list are recommended by your physician but may not be covered, or covered at this frequency, by your insurer. Please check with your insurance carrier before scheduling to verify coverage.    PREVENTATIV Limited to patients who meet one of the following criteria:   • Men who are 73-68 years old and have smoked more than 100 cigarettes in their lifetime   • Anyone with a family history    Colorectal Cancer Screening  Covered up to Age 76     Colonoscopy Scr this patient.  Please get this Mammogram regularly   Immunizations      Influenza  Covered Annually Orders placed or performed in visit on 10/11/16   • FLU VACC [address] ANTIG   Orders placed or performed in visit on 10/29/15   • INFLUENZA VIRUS VACCIN Websites for Advanced Directives    [employer]. org/publications/Documents/personal_dec. pdf  An information packet, including necessary form from the [address] website. http://www. [place]state. [place]/public/books/advin.htm  A link Trigs LDL Cholesterol (mg/dL)   Date Value   10/05/2018 72     LDL CHOLESTROL (mg/dL)   Date Value   03/10/2014 105     Cholesterol, Total (mg/dL)   Date Value   10/05/2018 128     CHOLESTEROL (mg/dL)   Date Value   03/10/2014 180     TRIGLYCERIDES (mg/dL) patient has history of long-term glucocorticoid use for medical condition    Last Dexa Scan:   XR DEXA BONE DENSITOMETRY (CPT=77080)     No flowsheet data found.     Recommended for 2 year anniversary or as ordered in After Visit Summary   Pap and Pelvic IX concentrates   Clients of institutions for the mentally retarded   Persons who live in the same house as a HepB virus carrier   Homosexual men   Illicit injectable drug abusers     Tetanus Toxoid- Only covered with a cut with metal- TD and TDaP Not cove

## 2019-03-04 NOTE — PROGRESS NOTES
UPPER EXTREMITY EVALUATION:   Referring Physician: Dr. Vijaya Bowers  Diagnosis: L shoulder pain     Date of Service: 3/5/2019     PATIENT Serene Casillas is a 77year old y/o female who presents to therapy today with complaints of  L shoulder pain t ***/5  Abduction: R ***/5; L ***/5  ER: R ***/5; L ***/5  IR: R ***/5; L ***/5 Flexion: R ***/5; L ***/5  Extension: R ***/5; L ***/5  Supination: R ***/5; L ***/5  Pronation: R ***/5; L ***/5  Rhomboids: R***/5, L ***/5  Mid trap: R ***/5; L ***/5   Lats: treatment limitation: None  Rehab Potential:good    FOTO: 50/100      Patient/Family/Caregiver was advised of these findings, precautions, and treatment options and has agreed to actively participate in planning and for this course of care.     Thank you fo

## 2019-03-05 ENCOUNTER — APPOINTMENT (OUTPATIENT)
Dept: PHYSICAL THERAPY | Age: 69
End: 2019-03-05
Attending: FAMILY MEDICINE
Payer: MEDICARE

## 2019-03-07 NOTE — PROGRESS NOTES
UPPER EXTREMITY EVALUATION:   Referring Physician: Dr. Carol Gamez  Diagnosis: L shoulder pain     Date of Service: 3/8/2019     PATIENT Daniel Salter is a 77year old y/o female who presents to therapy today with complaints of  L shoulder pain t ***/5  Abduction: R ***/5; L ***/5  ER: R ***/5; L ***/5  IR: R ***/5; L ***/5 Flexion: R ***/5; L ***/5  Extension: R ***/5; L ***/5  Supination: R ***/5; L ***/5  Pronation: R ***/5; L ***/5  Rhomboids: R***/5, L ***/5  Mid trap: R ***/5; L ***/5   Lats: treatment limitation: None  Rehab Potential:good    FOTO: 50/100      Patient/Family/Caregiver was advised of these findings, precautions, and treatment options and has agreed to actively participate in planning and for this course of care.     Thank you fo

## 2019-03-08 ENCOUNTER — TELEPHONE (OUTPATIENT)
Dept: FAMILY MEDICINE CLINIC | Facility: CLINIC | Age: 69
End: 2019-03-08

## 2019-03-08 ENCOUNTER — APPOINTMENT (OUTPATIENT)
Dept: PHYSICAL THERAPY | Age: 69
End: 2019-03-08
Attending: FAMILY MEDICINE
Payer: MEDICARE

## 2019-03-08 ENCOUNTER — OFFICE VISIT (OUTPATIENT)
Dept: FAMILY MEDICINE CLINIC | Facility: CLINIC | Age: 69
End: 2019-03-08
Payer: MEDICARE

## 2019-03-08 VITALS
SYSTOLIC BLOOD PRESSURE: 124 MMHG | OXYGEN SATURATION: 98 % | HEART RATE: 60 BPM | TEMPERATURE: 98 F | DIASTOLIC BLOOD PRESSURE: 80 MMHG

## 2019-03-08 DIAGNOSIS — I10 ESSENTIAL HYPERTENSION: Primary | ICD-10-CM

## 2019-03-08 DIAGNOSIS — K52.9 COLITIS: Primary | ICD-10-CM

## 2019-03-08 PROCEDURE — 99214 OFFICE O/P EST MOD 30 MIN: CPT | Performed by: FAMILY MEDICINE

## 2019-03-08 NOTE — TELEPHONE ENCOUNTER
Pl fax the lab orders, CMP, BMP and creatine or BUN.
This was faxed earlier- I will refax
GOAL: Patient will perform all transfers independently, in 2 weeks.

## 2019-03-08 NOTE — PROGRESS NOTES
Martinez Lawson is a 77year old female. Patient presents with:   Other: fup on cholitis--was at [facility] on 02/16 and was released on 02/18-still having some diarrhea-not having pain like she did but still having on and off shooting pain in upper RT fouzia Omega-3 Fatty Acids (FISH OIL) 1200 MG Oral Cap Take  by mouth daily. Disp:  Rfl:    aspirin 81 MG Oral Chew Tab Chew 81 mg by mouth daily. Disp:  Rfl:    Cyanocobalamin (B-12) 500 MCG Oral Tab Take 1,000 Units by mouth.  Disp:  Rfl:      No current facil Smoker        Packs/day: 1.00        Years: 40.00        Pack years: 40        Types: Cigarettes        Quit date: 2016        Years since quittin.9      Smokeless tobacco: Never Used    Alcohol use:  Yes      Alcohol/week: 0.0 oz      Comment: 2-3 other areas of colonic wall thickening are seen. Diverticuli are also seen elsewhere in the sigmoid colon and to a lesser degree the descending and transverse colon.  A normal appendix is not definitely seen but no abnormal appendix is demonstrated in the c with vascular calcification and minimal linear scarring at the left lung base. 4. Left greater than right hip degenerative changes. These findings on the left are definitely more prominent than on the previous CT scan from 2015.  No definite evidence of le

## 2019-03-08 NOTE — TELEPHONE ENCOUNTER
Calling central scheduling   They will need current BMP    Patient is to have @   4 hour fast   Scheduled Monday 3/11/19 at 12pm    Calling the patient - she will make that appointment   Faxing lab order (they will draw the lab prior to the CT scan

## 2019-03-11 ENCOUNTER — TELEPHONE (OUTPATIENT)
Dept: FAMILY MEDICINE CLINIC | Facility: CLINIC | Age: 69
End: 2019-03-11

## 2019-03-11 NOTE — TELEPHONE ENCOUNTER
Calling the patient to advise that the CT scan significant improvement with inflammation.      And the BMP was good

## 2019-03-12 ENCOUNTER — OFFICE VISIT (OUTPATIENT)
Dept: PHYSICAL THERAPY | Age: 69
End: 2019-03-12
Attending: FAMILY MEDICINE
Payer: MEDICARE

## 2019-03-12 DIAGNOSIS — M75.82 ROTATOR CUFF TENDINITIS, LEFT: ICD-10-CM

## 2019-03-12 PROCEDURE — 97162 PT EVAL MOD COMPLEX 30 MIN: CPT

## 2019-03-12 PROCEDURE — 97110 THERAPEUTIC EXERCISES: CPT

## 2019-03-12 NOTE — PROGRESS NOTES
UPPER EXTREMITY EVALUATION:   Referring Physician: Dr. Margarita Montenegro  Diagnosis: L shoulder pain     Date of Service: 3/12/2019     PATIENT Miguel Shipley is a 77year old y/o female who presents to therapy today with complaints of  L shoulder pain Flexion: R 5/5; L 5/5  Extension: R 5/5; L 5/5  Supination: R 5/5; L 5/5  Pronation: R 5/5; L 5/5  Rhomboids: R 4/5, L 4/5  Mid trap: R 4/5; L 4/5   Lats: R 5/5, L 5/5  Low trap: R 4/5; L 4/5     Special tests:    Subacromial Pain Syndrome:  Empty Can test day period. Treatment will include: Manual Therapy; Therapeutic Exercises; Neuromuscular Re-education; Therapeutic Activity;  Pt education; Home exercise program instructions    Education or treatment limitation: None  Rehab Potential:good    FOTO: 50/100

## 2019-03-14 ENCOUNTER — TELEPHONE (OUTPATIENT)
Dept: FAMILY MEDICINE CLINIC | Facility: CLINIC | Age: 69
End: 2019-03-14

## 2019-03-14 NOTE — TELEPHONE ENCOUNTER
Contacted patient, notified note will be ready to  tomorrow morning, just awaiting Dr. Edmondson Reusing signature, will  tomorrow, no need to call.

## 2019-03-15 ENCOUNTER — OFFICE VISIT (OUTPATIENT)
Dept: PHYSICAL THERAPY | Age: 69
End: 2019-03-15
Attending: FAMILY MEDICINE
Payer: MEDICARE

## 2019-03-15 DIAGNOSIS — M75.82 ROTATOR CUFF TENDINITIS, LEFT: ICD-10-CM

## 2019-03-15 PROCEDURE — 97140 MANUAL THERAPY 1/> REGIONS: CPT

## 2019-03-15 PROCEDURE — 97110 THERAPEUTIC EXERCISES: CPT

## 2019-03-15 NOTE — PROGRESS NOTES
Dx: L shoulder impingement         Authorized # of Visits:  Med necessity         Next MD visit: none scheduled  Fall Risk: standard         Precautions: n/a             Subjective: A little sore this am, may have pushed the stretches at home a little too achieved in PT (10 visits)      Plan:  Add SL ER  Date: 3/15/2019 TX#: 2/10 Date:               TX#: 3/   Date:               TX#: 4/ Date:               TX#: 5/ Date:               TX#: 6/ Date:               TX#: 7/ Date:               TX#: 8/   UBE 3/3

## 2019-03-19 ENCOUNTER — OFFICE VISIT (OUTPATIENT)
Dept: PHYSICAL THERAPY | Age: 69
End: 2019-03-19
Attending: FAMILY MEDICINE
Payer: MEDICARE

## 2019-03-19 ENCOUNTER — TELEPHONE (OUTPATIENT)
Dept: FAMILY MEDICINE CLINIC | Facility: CLINIC | Age: 69
End: 2019-03-19

## 2019-03-19 DIAGNOSIS — M75.82 ROTATOR CUFF TENDINITIS, LEFT: ICD-10-CM

## 2019-03-19 PROCEDURE — 97140 MANUAL THERAPY 1/> REGIONS: CPT

## 2019-03-19 PROCEDURE — 97110 THERAPEUTIC EXERCISES: CPT

## 2019-03-19 PROCEDURE — 97014 ELECTRIC STIMULATION THERAPY: CPT

## 2019-03-19 NOTE — TELEPHONE ENCOUNTER
Arm pain, going to therapy, will Crescencio Flores prescribe something? Aleve does not help. Call Xavier Chauhan.

## 2019-03-19 NOTE — PROGRESS NOTES
Dx: L shoulder impingement         Authorized # of Visits:  Med necessity         Next MD visit: none scheduled  Fall Risk: standard         Precautions: n/a             Subjective: Shoulder has been constantly throbbing all weekend.  Can't lift arm above s 60 to be able to reach in back pocket, tuck in shirt, and turn steering wheel without pain (10 visits)  · Pt will improve shoulder strength throughout to 5/5 to improve function with [facility] reach/lift (10 visits)  · Pt will demonstrate increased mid/low trap s

## 2019-03-19 NOTE — TELEPHONE ENCOUNTER
Yes it is okay to use the diclofenac 75 mg twice a day with food. Cannot take any Aleve or Motrin or other anti-inflammatories while on that. If it is not responding, will need to stop physical therapy and get an evaluation by Dr. Cathy Iniguez.

## 2019-03-19 NOTE — TELEPHONE ENCOUNTER
Calling the patient-     She is going to therapy for her arm - 2 sessions.  She has her third session today    It is so sore she can not sleep, it hurts to touch- very painful     The Tramadol does nothing so she has been taking Aleve TID (she stopped the T

## 2019-03-19 NOTE — TELEPHONE ENCOUNTER
I called the patient and she took a baby aspirin this morning.  She did not take Aleve today     Dr Wale Arias advised-

## 2019-03-22 ENCOUNTER — OFFICE VISIT (OUTPATIENT)
Dept: PHYSICAL THERAPY | Age: 69
End: 2019-03-22
Attending: FAMILY MEDICINE
Payer: MEDICARE

## 2019-03-22 DIAGNOSIS — M75.82 ROTATOR CUFF TENDINITIS, LEFT: ICD-10-CM

## 2019-03-22 PROCEDURE — 97110 THERAPEUTIC EXERCISES: CPT

## 2019-03-22 PROCEDURE — 97140 MANUAL THERAPY 1/> REGIONS: CPT

## 2019-03-22 PROCEDURE — 97014 ELECTRIC STIMULATION THERAPY: CPT

## 2019-03-22 NOTE — PROGRESS NOTES
Dx: L shoulder impingement         Authorized # of Visits:  Med necessity         Next MD visit: none scheduled  Fall Risk: standard         Precautions: n/a             Subjective: Shoulder feels better, started using CBC oil at home and it seems to help. will be independent and compliant with comprehensive HEP to maintain progress achieved in PT (10 visits)      Plan: Resume exercise as tolerated  Date: 3/15/2019 TX#: 2/10 Date:3/19/2019         TX#: 3/10   Date: 3/22/2019         TX#: 4/10 Date:

## 2019-03-26 ENCOUNTER — TELEPHONE (OUTPATIENT)
Dept: FAMILY MEDICINE CLINIC | Facility: CLINIC | Age: 69
End: 2019-03-26

## 2019-03-26 ENCOUNTER — OFFICE VISIT (OUTPATIENT)
Dept: PHYSICAL THERAPY | Age: 69
End: 2019-03-26
Attending: FAMILY MEDICINE
Payer: MEDICARE

## 2019-03-26 DIAGNOSIS — M25.512 LEFT SHOULDER PAIN, UNSPECIFIED CHRONICITY: Primary | ICD-10-CM

## 2019-03-26 DIAGNOSIS — M75.82 ROTATOR CUFF TENDINITIS, LEFT: ICD-10-CM

## 2019-03-26 PROCEDURE — 97140 MANUAL THERAPY 1/> REGIONS: CPT

## 2019-03-26 PROCEDURE — 97014 ELECTRIC STIMULATION THERAPY: CPT

## 2019-03-26 PROCEDURE — 97110 THERAPEUTIC EXERCISES: CPT

## 2019-03-26 NOTE — PROGRESS NOTES
Dx: L shoulder impingement         Authorized # of Visits:  Med necessity         Next MD visit: none scheduled  Fall Risk: standard         Precautions: n/a             Subjective: Shoulder making progress, slow, but better.  Don't feel like I need to see 3/15/2019 TX#: 2/10 Date:3/19/2019         TX#: 3/10   Date: 3/22/2019         TX#: 4/10 Date: 3/26/2019       TX#: 5/10 Date:               TX#: 6/ Date:               TX#: 7/ Date:               TX#: 8/   UBE 3/3 UBE 3/3 UBE 3/3 Lv2 UBE 3/3 Lv2      STM

## 2019-03-26 NOTE — TELEPHONE ENCOUNTER
Per previous phone call ( 3/19/19) if the diclofenac did not help the patient will have to stop therapy and see Dr Mohan Room the patient-   She has been using the CBD cream for pain relief to help her get through the therapy.  She only took th

## 2019-03-27 NOTE — TELEPHONE ENCOUNTER
Called the patient and now she does not want the xray- she has been doing a lot of reading on this and she doesn't think that the xray will show anything. She thinks that it is calcific tendonitis.    She now states that the therapy helps a little- she

## 2019-03-27 NOTE — TELEPHONE ENCOUNTER
Okay to get a x-ray of her left shoulder.   Recommend Dr. Anahi Chen at [facility] orthopedics

## 2019-03-28 ENCOUNTER — TELEPHONE (OUTPATIENT)
Dept: FAMILY MEDICINE CLINIC | Facility: CLINIC | Age: 69
End: 2019-03-28

## 2019-04-02 ENCOUNTER — OFFICE VISIT (OUTPATIENT)
Dept: PHYSICAL THERAPY | Age: 69
End: 2019-04-02
Attending: FAMILY MEDICINE
Payer: MEDICARE

## 2019-04-02 DIAGNOSIS — M75.82 ROTATOR CUFF TENDINITIS, LEFT: ICD-10-CM

## 2019-04-02 PROCEDURE — 97140 MANUAL THERAPY 1/> REGIONS: CPT

## 2019-04-02 PROCEDURE — 97110 THERAPEUTIC EXERCISES: CPT

## 2019-04-02 PROCEDURE — 97014 ELECTRIC STIMULATION THERAPY: CPT

## 2019-04-02 NOTE — PROGRESS NOTES
Dx: L shoulder impingement         Authorized # of Visits:  Med necessity         Next MD visit: none scheduled  Fall Risk: standard         Precautions: n/a             Subjective: Shoulder sore today, but had been pretty good last couple days.  Still limi tolerated  Date: 3/15/2019 TX#: 2/10 Date:3/19/2019         TX#: 3/10   Date: 3/22/2019         TX#: 4/10 Date: 3/26/2019       TX#: 5/10 Date: 4/2/2019         TX#: 6/10 Date:               TX#: 7/ Date:               TX#: 8/   UBE 3/3 UBE 3/3 UBE 3/3 Lv2

## 2019-04-05 ENCOUNTER — OFFICE VISIT (OUTPATIENT)
Dept: PHYSICAL THERAPY | Age: 69
End: 2019-04-05
Attending: FAMILY MEDICINE
Payer: MEDICARE

## 2019-04-05 PROCEDURE — 97014 ELECTRIC STIMULATION THERAPY: CPT

## 2019-04-05 PROCEDURE — 97140 MANUAL THERAPY 1/> REGIONS: CPT

## 2019-04-05 PROCEDURE — 97110 THERAPEUTIC EXERCISES: CPT

## 2019-04-05 NOTE — PROGRESS NOTES
Dx: L shoulder impingement         Authorized # of Visits:  Med necessity         Next MD visit: none scheduled  Fall Risk: standard         Precautions: n/a             Subjective: Shoulder pain come/goes from day to day.  Yesterday was really sore, today TX#: 3/10   Date: 3/22/2019         TX#: 4/10 Date: 3/26/2019       TX#: 5/10 Date: 4/2/2019         TX#: 6/10 Date: 4/5/2019          TX#: 7/10 Date:               TX#: 8/   UBE 3/3 UBE 3/3 UBE 3/3 Lv2 UBE 3/3 Lv2 UBE 3/3 Lv2 UBE 3/3L2    STM L UT/pec min

## 2019-04-08 ENCOUNTER — MED REC SCAN ONLY (OUTPATIENT)
Dept: FAMILY MEDICINE CLINIC | Facility: CLINIC | Age: 69
End: 2019-04-08

## 2019-04-08 RX ORDER — THIOTHIXENE 1 MG/1
1 CAPSULE ORAL DAILY
COMMUNITY

## 2019-04-08 RX ORDER — SERTRALINE HYDROCHLORIDE 25 MG/1
25 TABLET, FILM COATED ORAL DAILY
COMMUNITY
End: 2020-11-18 | Stop reason: ALTCHOICE

## 2019-04-08 RX ORDER — AMOXICILLIN 500 MG
CAPSULE ORAL
COMMUNITY

## 2019-04-08 RX ORDER — BUDESONIDE AND FORMOTEROL FUMARATE DIHYDRATE 160; 4.5 UG/1; UG/1
AEROSOL RESPIRATORY (INHALATION)
COMMUNITY
End: 2019-07-18 | Stop reason: ALTCHOICE

## 2019-04-08 RX ORDER — METOPROLOL SUCCINATE 100 MG/1
TABLET, EXTENDED RELEASE ORAL
COMMUNITY
End: 2021-04-05

## 2019-04-08 RX ORDER — CLONAZEPAM 0.5 MG/1
TABLET ORAL
COMMUNITY
End: 2021-04-05

## 2019-04-08 RX ORDER — SIMVASTATIN 20 MG
TABLET ORAL
COMMUNITY

## 2019-04-08 RX ORDER — ACETAMINOPHEN 160 MG
TABLET,DISINTEGRATING ORAL
COMMUNITY

## 2019-04-08 RX ORDER — CALCIUM CARBONATE 600 MG
TABLET ORAL
COMMUNITY
End: 2019-07-18 | Stop reason: ALTCHOICE

## 2019-04-08 RX ORDER — ESOMEPRAZOLE MAGNESIUM 40 MG/1
CAPSULE, DELAYED RELEASE ORAL
COMMUNITY
End: 2019-07-18 | Stop reason: ALTCHOICE

## 2019-04-08 RX ORDER — AMLODIPINE BESYLATE 10 MG/1
TABLET ORAL
COMMUNITY

## 2019-04-09 ENCOUNTER — TELEPHONE (OUTPATIENT)
Dept: FAMILY MEDICINE CLINIC | Facility: CLINIC | Age: 69
End: 2019-04-09

## 2019-04-09 ENCOUNTER — OFFICE VISIT (OUTPATIENT)
Dept: PHYSICAL THERAPY | Age: 69
End: 2019-04-09
Attending: FAMILY MEDICINE
Payer: MEDICARE

## 2019-04-09 PROCEDURE — 97140 MANUAL THERAPY 1/> REGIONS: CPT

## 2019-04-09 PROCEDURE — 97014 ELECTRIC STIMULATION THERAPY: CPT

## 2019-04-09 PROCEDURE — 97110 THERAPEUTIC EXERCISES: CPT

## 2019-04-09 NOTE — PROGRESS NOTES
Dx: L shoulder impingement         Authorized # of Visits:  Med necessity         Next MD visit: none scheduled  Fall Risk: standard         Precautions: n/a             Subjective: Shoulder getting better, improved ROM.  Easier time washing hair and able t progress achieved in PT (10 visits)      Plan: Add tband exercises  Date: 3/15/2019 TX#: 2/10 Date:3/19/2019         TX#: 3/10   Date: 3/22/2019         TX#: 4/10 Date: 3/26/2019       TX#: 5/10 Date: 4/2/2019         TX#: 6/10 Date: 4/5/2019          TX#:

## 2019-04-09 NOTE — TELEPHONE ENCOUNTER
She has tests ordered by Dr Wanda Grant - two MRA's and she has a letter that will clear her to be able to have these scans because of an ear implant that she has    If this needs to be faxed to [facility]'s will I be able to fax for her?      I advised that

## 2019-04-10 ENCOUNTER — HOSPITAL ENCOUNTER (OUTPATIENT)
Dept: MRI IMAGING | Facility: HOSPITAL | Age: 69
Discharge: HOME | End: 2019-04-10
Attending: INTERNAL MEDICINE
Payer: MEDICARE

## 2019-04-10 DIAGNOSIS — K52.9 RIGHT SIDED COLITIS: ICD-10-CM

## 2019-04-10 PROCEDURE — 74185 MRA ABD W OR W/O CNTRST: CPT | Performed by: INTERNAL MEDICINE

## 2019-04-10 PROCEDURE — 72198 MR ANGIO PELVIS W/O & W/DYE: CPT | Performed by: INTERNAL MEDICINE

## 2019-04-10 PROCEDURE — A9575 INJ GADOTERATE MEGLUMI 0.1ML: HCPCS | Performed by: INTERNAL MEDICINE

## 2019-04-11 ENCOUNTER — APPOINTMENT (OUTPATIENT)
Dept: PHYSICAL THERAPY | Age: 69
End: 2019-04-11
Attending: FAMILY MEDICINE
Payer: MEDICARE

## 2019-04-15 ENCOUNTER — APPOINTMENT (OUTPATIENT)
Dept: PHYSICAL THERAPY | Age: 69
End: 2019-04-15
Attending: FAMILY MEDICINE
Payer: MEDICARE

## 2019-04-15 ENCOUNTER — TELEPHONE (OUTPATIENT)
Dept: CARDIOLOGY | Age: 69
End: 2019-04-15

## 2019-04-15 DIAGNOSIS — I70.0 AORTIC ATHEROSCLEROSIS (CMD): Primary | ICD-10-CM

## 2019-04-15 DIAGNOSIS — K55.1 SUPERIOR MESENTERIC ARTERY STENOSIS (CMD): ICD-10-CM

## 2019-04-16 PROBLEM — K52.9 NONINFECTIVE GASTROENTERITIS AND COLITIS, UNSPECIFIED: Status: ACTIVE | Noted: 2019-04-16

## 2019-04-17 RX ORDER — SIMVASTATIN 20 MG
20 TABLET ORAL NIGHTLY
Qty: 90 TABLET | Refills: 1 | Status: SHIPPED | OUTPATIENT
Start: 2019-04-17 | End: 2019-10-29

## 2019-04-18 ENCOUNTER — APPOINTMENT (OUTPATIENT)
Dept: PHYSICAL THERAPY | Age: 69
End: 2019-04-18
Attending: FAMILY MEDICINE
Payer: MEDICARE

## 2019-04-19 ENCOUNTER — APPOINTMENT (OUTPATIENT)
Dept: LAB | Age: 69
End: 2019-04-19
Attending: FAMILY MEDICINE
Payer: MEDICARE

## 2019-04-19 DIAGNOSIS — R73.01 IMPAIRED FASTING GLUCOSE: ICD-10-CM

## 2019-04-19 DIAGNOSIS — I10 ESSENTIAL HYPERTENSION: ICD-10-CM

## 2019-04-19 DIAGNOSIS — E78.5 HYPERLIPIDEMIA, UNSPECIFIED HYPERLIPIDEMIA TYPE: ICD-10-CM

## 2019-04-19 PROCEDURE — 36415 COLL VENOUS BLD VENIPUNCTURE: CPT

## 2019-04-19 PROCEDURE — 83036 HEMOGLOBIN GLYCOSYLATED A1C: CPT

## 2019-04-19 PROCEDURE — 80053 COMPREHEN METABOLIC PANEL: CPT

## 2019-04-19 PROCEDURE — 80061 LIPID PANEL: CPT

## 2019-04-22 ENCOUNTER — OFFICE VISIT (OUTPATIENT)
Dept: PHYSICAL THERAPY | Age: 69
End: 2019-04-22
Attending: FAMILY MEDICINE
Payer: MEDICARE

## 2019-04-22 ENCOUNTER — TELEPHONE (OUTPATIENT)
Dept: FAMILY MEDICINE CLINIC | Facility: CLINIC | Age: 69
End: 2019-04-22

## 2019-04-22 PROCEDURE — 97110 THERAPEUTIC EXERCISES: CPT

## 2019-04-22 PROCEDURE — 97140 MANUAL THERAPY 1/> REGIONS: CPT

## 2019-04-22 NOTE — PROGRESS NOTES
Dx: L shoulder impingement         Authorized # of Visits:  Med necessity         Next MD visit: none scheduled  Fall Risk: standard         Precautions: n/a             Subjective: Shoulder continues to feel better, some soreness from being away from PT f achieved in PT (10 visits)      Plan: Reassess   Date: 3/15/2019 TX#: 2/10 Date:3/19/2019         TX#: 3/10   Date: 3/22/2019         TX#: 4/10 Date: 3/26/2019       TX#: 5/10 Date: 4/2/2019         TX#: 6/10 Date: 4/5/2019          TX#: 7/10 Date: 4/9/201 millicent lovett          [facility] L shoulder 12'                       Skilled Services: STM and joint mobilization to increase shoulder mobility and reduce pain.     Charges: man therapy 1, therapeutic ex 2, e-stim unattend      Total Timed Treatment: 38 min  Tota

## 2019-04-22 NOTE — TELEPHONE ENCOUNTER
Called the patient and advised     Future Appointments   Date Time Provider Chandan Cody   4/24/2019  7:00 AM YK CARD PV VIRTUAL ROOM 1 YK CARD [place]   4/25/2019 10:00 AM SHELLEY Bynum   4/25/2019 11:00 AM EMG SANDWICH NURSE EMG

## 2019-04-24 ENCOUNTER — HOSPITAL ENCOUNTER (OUTPATIENT)
Dept: CV DIAGNOSTICS | Age: 69
Discharge: HOME | End: 2019-04-24
Attending: INTERNAL MEDICINE
Payer: MEDICARE

## 2019-04-24 DIAGNOSIS — I70.0 ATHEROSCLEROSIS OF AORTA (HCC): ICD-10-CM

## 2019-04-24 DIAGNOSIS — I77.1 ARTERY STENOSIS (HCC): ICD-10-CM

## 2019-04-24 PROCEDURE — 93978 VASCULAR STUDY: CPT | Performed by: INTERNAL MEDICINE

## 2019-04-25 ENCOUNTER — OFFICE VISIT (OUTPATIENT)
Dept: PHYSICAL THERAPY | Age: 69
End: 2019-04-25
Attending: FAMILY MEDICINE
Payer: MEDICARE

## 2019-04-25 ENCOUNTER — NURSE ONLY (OUTPATIENT)
Dept: FAMILY MEDICINE CLINIC | Facility: CLINIC | Age: 69
End: 2019-04-25
Payer: MEDICARE

## 2019-04-25 PROCEDURE — 90471 IMMUNIZATION ADMIN: CPT | Performed by: FAMILY MEDICINE

## 2019-04-25 PROCEDURE — 97014 ELECTRIC STIMULATION THERAPY: CPT

## 2019-04-25 PROCEDURE — 97110 THERAPEUTIC EXERCISES: CPT

## 2019-04-25 PROCEDURE — 97140 MANUAL THERAPY 1/> REGIONS: CPT

## 2019-04-25 PROCEDURE — 90750 HZV VACC RECOMBINANT IM: CPT | Performed by: FAMILY MEDICINE

## 2019-04-25 NOTE — PROGRESS NOTES
Discharge Summary    Pt has attended 10, cancelled 0, and no shown 0 visits in Physical Therapy. Subjective: Shoulder feels much better after therapy. Still have stiffness reaching behind back, but can now groom hair without limitation.  Sleep no longe mid/low trap strength to 5/5 to promote improved shoulder mechanics and stabilization with lifting and reaching (10 visits)-Met  · Pt will be independent and compliant with comprehensive HEP to maintain progress achieved in PT (10 visits)-Met      Rehab Po grade 3 6'  [address] distraction Joint mob  [address] inf/post grade 3 6'  [address] distraction Joint mob  [address] inf/post grade 3 4'  [address] distraction   PROM all planes  Post capsule stretch 5x30\" PROM all planes  Post capsule stretch 5x30\" PROM all planes  Post capsule stretch

## 2019-05-01 ENCOUNTER — TELEPHONE (OUTPATIENT)
Dept: CARDIOLOGY | Age: 69
End: 2019-05-01

## 2019-05-02 ENCOUNTER — TELEPHONE (OUTPATIENT)
Dept: CARDIOLOGY | Age: 69
End: 2019-05-02

## 2019-05-16 ENCOUNTER — TELEPHONE (OUTPATIENT)
Dept: FAMILY MEDICINE CLINIC | Facility: CLINIC | Age: 69
End: 2019-05-16

## 2019-05-21 ENCOUNTER — TELEPHONE (OUTPATIENT)
Dept: CARDIOLOGY | Age: 69
End: 2019-05-21

## 2019-06-25 ENCOUNTER — TELEPHONE (OUTPATIENT)
Dept: CARDIOLOGY | Age: 69
End: 2019-06-25

## 2019-07-12 ENCOUNTER — OFFICE VISIT (OUTPATIENT)
Dept: FAMILY MEDICINE CLINIC | Facility: CLINIC | Age: 69
End: 2019-07-12
Payer: MEDICARE

## 2019-07-12 VITALS
TEMPERATURE: 98 F | DIASTOLIC BLOOD PRESSURE: 70 MMHG | WEIGHT: 176.25 LBS | HEART RATE: 72 BPM | SYSTOLIC BLOOD PRESSURE: 136 MMHG | OXYGEN SATURATION: 96 % | BODY MASS INDEX: 33 KG/M2

## 2019-07-12 DIAGNOSIS — J20.9 COPD (CHRONIC OBSTRUCTIVE PULMONARY DISEASE) WITH ACUTE BRONCHITIS (HCC): Primary | ICD-10-CM

## 2019-07-12 DIAGNOSIS — J44.0 COPD (CHRONIC OBSTRUCTIVE PULMONARY DISEASE) WITH ACUTE BRONCHITIS (HCC): Primary | ICD-10-CM

## 2019-07-12 PROCEDURE — 99213 OFFICE O/P EST LOW 20 MIN: CPT | Performed by: FAMILY MEDICINE

## 2019-07-12 RX ORDER — AZITHROMYCIN 250 MG/1
TABLET, FILM COATED ORAL
Qty: 6 TABLET | Refills: 0 | Status: SHIPPED | OUTPATIENT
Start: 2019-07-12 | End: 2019-08-03 | Stop reason: ALTCHOICE

## 2019-07-12 RX ORDER — ALBUTEROL SULFATE 90 UG/1
2 AEROSOL, METERED RESPIRATORY (INHALATION) EVERY 6 HOURS PRN
Qty: 1 INHALER | Refills: 0 | Status: SHIPPED | OUTPATIENT
Start: 2019-07-12 | End: 2019-08-03

## 2019-07-12 NOTE — PROGRESS NOTES
Rustam Nolasco is a 68year old female. Patient presents with:  URI: chest congestion, sore throat in the mornings-goes away in the afternoon, hoarseness, cough-started on 07/10-taking claritin d 12 hour and aleve. ...rom 1      HPI:   Patient was seen by Medical History:   Diagnosis Date   • Abdominal pain    • Anxiety state, unspecified    • Atypical mole    • Cataract     starting   • Depression    • Difficult intubation     pt says she has been told to say she has a small mouth   • Diverticulosis    • G Cigarettes        Quit date: 4/5/2016        Years since quitting: 3.2      Smokeless tobacco: Never Used    Alcohol use: No      Alcohol/week: 0.0 oz      Frequency: Never    Drug use: No       REVIEW OF SYSTEMS:   GENERAL HEALTH: feels well otherwise  SK into the lungs every 6 (six) hours as needed. • azithromycin (ZITHROMAX Z-IRINA) 250 MG Oral Tab 6 tablet 0     Sig: Take two tablets by mouth today, then one tablet daily.        Imaging & Consults:  None

## 2019-07-15 ENCOUNTER — TELEPHONE (OUTPATIENT)
Dept: FAMILY MEDICINE CLINIC | Facility: CLINIC | Age: 69
End: 2019-07-15

## 2019-07-17 PROBLEM — I70.203 ATHEROSCLEROSIS OF ARTERY OF BOTH LOWER EXTREMITIES (CMD): Status: ACTIVE | Noted: 2018-07-18

## 2019-07-17 RX ORDER — FLUTICASONE FUROATE AND VILANTEROL 100; 25 UG/1; UG/1
1 POWDER RESPIRATORY (INHALATION)
COMMUNITY
Start: 2019-07-15 | End: 2019-07-18 | Stop reason: ALTCHOICE

## 2019-07-17 RX ORDER — MULTIVIT,IRON,MINERALS/LUTEIN
1 TABLET ORAL
COMMUNITY

## 2019-07-17 RX ORDER — NAPROXEN 250 MG/1
1-2 TABLET ORAL
COMMUNITY
End: 2019-07-18 | Stop reason: ALTCHOICE

## 2019-07-17 RX ORDER — CYANOCOBALAMIN (VITAMIN B-12) 500 MCG
1000 TABLET ORAL
COMMUNITY

## 2019-07-17 RX ORDER — HYDROCHLOROTHIAZIDE 12.5 MG/1
12.5 TABLET ORAL
COMMUNITY
Start: 2018-09-25 | End: 2019-07-18 | Stop reason: ALTCHOICE

## 2019-07-17 RX ORDER — AZITHROMYCIN 250 MG/1
TABLET, FILM COATED ORAL
COMMUNITY
Start: 2019-07-12 | End: 2019-07-18 | Stop reason: ALTCHOICE

## 2019-07-17 RX ORDER — ALBUTEROL SULFATE 90 UG/1
2 AEROSOL, METERED RESPIRATORY (INHALATION)
COMMUNITY
Start: 2019-07-12 | End: 2020-07-27 | Stop reason: CLARIF

## 2019-07-17 RX ORDER — CLOBETASOL PROPIONATE 0.5 MG/G
OINTMENT TOPICAL
COMMUNITY
Start: 2016-07-15

## 2019-07-18 ENCOUNTER — OFFICE VISIT (OUTPATIENT)
Dept: CARDIOLOGY | Age: 69
End: 2019-07-18

## 2019-07-18 VITALS
SYSTOLIC BLOOD PRESSURE: 122 MMHG | HEIGHT: 62 IN | WEIGHT: 175 LBS | HEART RATE: 63 BPM | DIASTOLIC BLOOD PRESSURE: 68 MMHG | BODY MASS INDEX: 32.2 KG/M2

## 2019-07-18 DIAGNOSIS — I65.23 CAROTID STENOSIS, ASYMPTOMATIC, BILATERAL: Primary | ICD-10-CM

## 2019-07-18 DIAGNOSIS — E78.2 HYPERLIPIDEMIA, MIXED: ICD-10-CM

## 2019-07-18 DIAGNOSIS — R06.09 DYSPNEA ON EXERTION: ICD-10-CM

## 2019-07-18 DIAGNOSIS — I10 ESSENTIAL HYPERTENSION: ICD-10-CM

## 2019-07-18 DIAGNOSIS — I77.1 SUBCLAVIAN ARTERY STENOSIS (CMD): ICD-10-CM

## 2019-07-18 PROCEDURE — 99214 OFFICE O/P EST MOD 30 MIN: CPT | Performed by: NURSE PRACTITIONER

## 2019-07-18 ASSESSMENT — ENCOUNTER SYMPTOMS
HEMOPTYSIS: 0
ALLERGIC/IMMUNOLOGIC COMMENTS: NO NEW FOOD ALLERGIES
SUSPICIOUS LESIONS: 0
SHORTNESS OF BREATH: 1
HEMATOCHEZIA: 0
CHILLS: 0
COUGH: 1
BRUISES/BLEEDS EASILY: 0
FEVER: 0
WEIGHT GAIN: 0
WEIGHT LOSS: 0

## 2019-07-23 ENCOUNTER — TELEPHONE (OUTPATIENT)
Dept: CARDIOLOGY | Age: 69
End: 2019-07-23

## 2019-07-24 ENCOUNTER — HOSPITAL ENCOUNTER (OUTPATIENT)
Dept: CV DIAGNOSTICS | Age: 69
Discharge: HOME | End: 2019-07-24
Attending: NURSE PRACTITIONER
Payer: MEDICARE

## 2019-07-24 DIAGNOSIS — I65.23 ASYMPTOMATIC BILATERAL CAROTID ARTERY STENOSIS: ICD-10-CM

## 2019-07-24 DIAGNOSIS — I77.1 SUBCLAVIAN ARTERY STENOSIS (HCC): ICD-10-CM

## 2019-07-24 PROCEDURE — 93880 EXTRACRANIAL BILAT STUDY: CPT | Performed by: INTERNAL MEDICINE

## 2019-07-24 PROCEDURE — 93930 UPPER EXTREMITY STUDY: CPT | Performed by: INTERNAL MEDICINE

## 2019-08-02 ENCOUNTER — TELEPHONE (OUTPATIENT)
Dept: CARDIOLOGY | Age: 69
End: 2019-08-02

## 2019-08-03 ENCOUNTER — OFFICE VISIT (OUTPATIENT)
Dept: FAMILY MEDICINE CLINIC | Facility: CLINIC | Age: 69
End: 2019-08-03
Payer: MEDICARE

## 2019-08-03 VITALS
HEART RATE: 66 BPM | WEIGHT: 174.13 LBS | OXYGEN SATURATION: 95 % | BODY MASS INDEX: 33 KG/M2 | TEMPERATURE: 98 F | DIASTOLIC BLOOD PRESSURE: 62 MMHG | SYSTOLIC BLOOD PRESSURE: 120 MMHG

## 2019-08-03 DIAGNOSIS — R53.83 OTHER FATIGUE: Primary | ICD-10-CM

## 2019-08-03 DIAGNOSIS — G56.02 CARPAL TUNNEL SYNDROME OF LEFT WRIST: ICD-10-CM

## 2019-08-03 LAB
BASOPHILS # BLD AUTO: 0.04 X10(3) UL (ref 0–0.2)
BASOPHILS NFR BLD AUTO: 0.5 %
DEPRECATED RDW RBC AUTO: 42.3 FL (ref 35.1–46.3)
EOSINOPHIL # BLD AUTO: 0.15 X10(3) UL (ref 0–0.7)
EOSINOPHIL NFR BLD AUTO: 1.9 %
ERYTHROCYTE [DISTWIDTH] IN BLOOD BY AUTOMATED COUNT: 13.2 % (ref 11–15)
HCT VFR BLD AUTO: 39.9 % (ref 35–48)
HGB BLD-MCNC: 13.5 G/DL (ref 12–16)
IMM GRANULOCYTES # BLD AUTO: 0.02 X10(3) UL (ref 0–1)
IMM GRANULOCYTES NFR BLD: 0.2 %
LYMPHOCYTES # BLD AUTO: 1.95 X10(3) UL (ref 1–4)
LYMPHOCYTES NFR BLD AUTO: 24.1 %
MCH RBC QN AUTO: 29.7 PG (ref 26–34)
MCHC RBC AUTO-ENTMCNC: 33.8 G/DL (ref 31–37)
MCV RBC AUTO: 87.9 FL (ref 80–100)
MONOCYTES # BLD AUTO: 0.58 X10(3) UL (ref 0.1–1)
MONOCYTES NFR BLD AUTO: 7.2 %
NEUTROPHILS # BLD AUTO: 5.36 X10 (3) UL (ref 1.5–7.7)
NEUTROPHILS # BLD AUTO: 5.36 X10(3) UL (ref 1.5–7.7)
NEUTROPHILS NFR BLD AUTO: 66.1 %
PLATELET # BLD AUTO: 192 10(3)UL (ref 150–450)
RBC # BLD AUTO: 4.54 X10(6)UL (ref 3.8–5.3)
TSI SER-ACNC: 1.54 MIU/ML (ref 0.36–3.74)
WBC # BLD AUTO: 8.1 X10(3) UL (ref 4–11)

## 2019-08-03 PROCEDURE — 85025 COMPLETE CBC W/AUTO DIFF WBC: CPT | Performed by: FAMILY MEDICINE

## 2019-08-03 PROCEDURE — 84443 ASSAY THYROID STIM HORMONE: CPT | Performed by: FAMILY MEDICINE

## 2019-08-03 PROCEDURE — 99214 OFFICE O/P EST MOD 30 MIN: CPT | Performed by: FAMILY MEDICINE

## 2019-08-03 RX ORDER — ALBUTEROL SULFATE 90 UG/1
AEROSOL, METERED RESPIRATORY (INHALATION)
Qty: 8.5 INHALER | Refills: 1 | Status: SHIPPED | OUTPATIENT
Start: 2019-08-03 | End: 2020-12-07

## 2019-08-03 NOTE — PROGRESS NOTES
Charli Lewis is a 68year old female. Patient presents with:  Numbness: numbness in LT hand/falling asleep-wakes pt up at night. ..room 3  Fatigue: this has been for a while-the last couple wks it has been very bad. ..       HPI:   Patient complains that h (Patient taking differently: Take 1 mg by mouth daily.  ) Disp: 36 capsule Rfl: 0   Omega-3 Fatty Acids (FISH OIL) 1200 MG Oral Cap Take  by mouth daily. Disp:  Rfl:    aspirin 81 MG Oral Chew Tab Chew 81 mg by mouth daily.  Disp:  Rfl:    Cyanocobalamin (B Hypertension Mother    • Breast Cancer Mother    • Other (Other) Mother         COPD   • Hypertension Brother    • Other (Other) Brother    • Diabetes Maternal Aunt    • Ovarian Cancer Paternal Aunt    • Diabetes Maternal Uncle         Social History:  Soc Visit:  Requested Prescriptions      No prescriptions requested or ordered in this encounter       Imaging & Consults:  None

## 2019-08-05 ENCOUNTER — TELEPHONE (OUTPATIENT)
Dept: FAMILY MEDICINE CLINIC | Facility: CLINIC | Age: 69
End: 2019-08-05

## 2019-08-05 NOTE — TELEPHONE ENCOUNTER
----- Message from Blake Gorman DO sent at 8/5/2019  7:44 AM CDT -----  Notify thyroid function and blood count are normal.

## 2019-08-06 ENCOUNTER — HOSPITAL ENCOUNTER (OUTPATIENT)
Dept: CT IMAGING | Facility: HOSPITAL | Age: 69
Discharge: HOME | End: 2019-08-06
Attending: INTERNAL MEDICINE
Payer: MEDICARE

## 2019-08-06 DIAGNOSIS — R91.8 PULMONARY NODULES: ICD-10-CM

## 2019-08-06 PROCEDURE — 71250 CT THORAX DX C-: CPT | Performed by: INTERNAL MEDICINE

## 2019-08-14 ENCOUNTER — HOSPITAL ENCOUNTER (OUTPATIENT)
Dept: CV DIAGNOSTICS | Age: 69
Discharge: HOME | End: 2019-08-14
Attending: NURSE PRACTITIONER
Payer: MEDICARE

## 2019-08-14 DIAGNOSIS — R06.00 DYSPNEA ON EXERTION: ICD-10-CM

## 2019-08-14 DIAGNOSIS — I10 ESSENTIAL HYPERTENSION: ICD-10-CM

## 2019-08-14 DIAGNOSIS — I77.1 SUBCLAVIAN ARTERY STENOSIS (HCC): ICD-10-CM

## 2019-08-14 DIAGNOSIS — I65.23 ASYMPTOMATIC BILATERAL CAROTID ARTERY STENOSIS: ICD-10-CM

## 2019-08-14 PROCEDURE — 93018 CV STRESS TEST I&R ONLY: CPT | Performed by: NURSE PRACTITIONER

## 2019-08-14 PROCEDURE — 93017 CV STRESS TEST TRACING ONLY: CPT | Performed by: NURSE PRACTITIONER

## 2019-08-14 PROCEDURE — 78452 HT MUSCLE IMAGE SPECT MULT: CPT | Performed by: NURSE PRACTITIONER

## 2019-08-15 ENCOUNTER — TELEPHONE (OUTPATIENT)
Dept: CARDIOLOGY | Age: 69
End: 2019-08-15

## 2019-09-13 ENCOUNTER — OFFICE VISIT (OUTPATIENT)
Dept: FAMILY MEDICINE CLINIC | Facility: CLINIC | Age: 69
End: 2019-09-13
Payer: MEDICARE

## 2019-09-13 ENCOUNTER — TELEPHONE (OUTPATIENT)
Dept: FAMILY MEDICINE CLINIC | Facility: CLINIC | Age: 69
End: 2019-09-13

## 2019-09-13 VITALS
HEIGHT: 61 IN | OXYGEN SATURATION: 99 % | WEIGHT: 177.25 LBS | HEART RATE: 78 BPM | BODY MASS INDEX: 33.47 KG/M2 | TEMPERATURE: 98 F | SYSTOLIC BLOOD PRESSURE: 122 MMHG | DIASTOLIC BLOOD PRESSURE: 64 MMHG | RESPIRATION RATE: 18 BRPM

## 2019-09-13 DIAGNOSIS — M25.552 CHRONIC HIP PAIN, LEFT: Primary | ICD-10-CM

## 2019-09-13 DIAGNOSIS — G89.29 CHRONIC HIP PAIN, LEFT: Primary | ICD-10-CM

## 2019-09-13 PROCEDURE — 99213 OFFICE O/P EST LOW 20 MIN: CPT | Performed by: FAMILY MEDICINE

## 2019-09-13 NOTE — PROGRESS NOTES
Jose العراقي is a 68year old female. Patient presents with:  Leg Pain: She stated that the back of her legs hurt and side of leg and also hip on right side      HPI:   In 2014, patient was referred to Dr. Yumiko Gonzales regarding left hip pain.   X-ray showed times daily. Disp:  Rfl:    Vitamin D3 (VITAMIN D3) 2000 UNITS Oral Cap TAKE TWO CAPS OF 2,000 MG DAILY TO EQUAL 4,000 MG DAILY Disp:  Rfl:    thioTHIXene (NAVANE) 1 MG Oral Cap Take 1 capsule by mouth 3 (three) times daily. (Patient taking differently:  Ta to CA   • ORTHOPEDIC SURG (PBP)      back surgery   • OTHER SURGICAL HISTORY      herniated disc     Family History   Problem Relation Age of Onset   • Hypertension Father    • Other (Other) Father         LEUKEMIA   • Hypertension Mother    • Breast Cance FINDINGS:    BONES:  No acute osseous injuries, subluxations or dislocations are noted. There is mild osteoarthritis of both hips. There is mild osteoarthritis of the sacroiliac joints.   SOFT TISSUES:  No soft tissue swelling about the hips or pelvis declan

## 2019-09-14 NOTE — TELEPHONE ENCOUNTER
MRI was approved - will call to schedule -     Future Appointments   Date Time Provider Chandan Cody   9/16/2019  2:45 PM [facility] MR RM3 (3T WIDE) [facility] MRI Jenni Yu   10/25/2019 10:00 AM [facility] NURSE [facility] [place]     Will need to park in the S

## 2019-09-14 NOTE — TELEPHONE ENCOUNTER
Called scheduling     Future Appointments   Date Time Provider Chandan Cody   9/19/2019  1:30 PM [facility] MR RUI4 (3T WIDE) [facility] MRI Daphney Mijares   10/25/2019 10:00 AM [facility] NURSE [facility] [place]     I called the patient and advised

## 2019-09-17 ENCOUNTER — TELEPHONE (OUTPATIENT)
Dept: FAMILY MEDICINE CLINIC | Facility: CLINIC | Age: 69
End: 2019-09-17

## 2019-09-19 ENCOUNTER — HOSPITAL ENCOUNTER (OUTPATIENT)
Dept: MRI IMAGING | Facility: HOSPITAL | Age: 69
Discharge: HOME | End: 2019-09-19
Attending: FAMILY MEDICINE
Payer: MEDICARE

## 2019-09-19 DIAGNOSIS — M25.552 CHRONIC HIP PAIN, LEFT: ICD-10-CM

## 2019-09-19 DIAGNOSIS — G89.29 CHRONIC HIP PAIN, LEFT: ICD-10-CM

## 2019-09-19 PROCEDURE — 73721 MRI JNT OF LWR EXTRE W/O DYE: CPT | Performed by: FAMILY MEDICINE

## 2019-10-29 ENCOUNTER — TELEPHONE (OUTPATIENT)
Dept: FAMILY MEDICINE CLINIC | Facility: CLINIC | Age: 69
End: 2019-10-29

## 2019-10-29 RX ORDER — SIMVASTATIN 20 MG
20 TABLET ORAL NIGHTLY
Qty: 90 TABLET | Refills: 1 | Status: SHIPPED | OUTPATIENT
Start: 2019-10-29 | End: 2020-01-07

## 2019-11-01 ENCOUNTER — OFFICE VISIT (OUTPATIENT)
Dept: FAMILY MEDICINE CLINIC | Facility: CLINIC | Age: 69
End: 2019-11-01
Payer: MEDICARE

## 2019-11-01 VITALS
DIASTOLIC BLOOD PRESSURE: 72 MMHG | WEIGHT: 179.13 LBS | OXYGEN SATURATION: 96 % | BODY MASS INDEX: 34 KG/M2 | SYSTOLIC BLOOD PRESSURE: 132 MMHG | TEMPERATURE: 98 F | HEART RATE: 64 BPM

## 2019-11-01 DIAGNOSIS — M48.062 SPINAL STENOSIS OF LUMBAR REGION WITH NEUROGENIC CLAUDICATION: Primary | ICD-10-CM

## 2019-11-01 PROCEDURE — 90750 HZV VACC RECOMBINANT IM: CPT | Performed by: FAMILY MEDICINE

## 2019-11-01 PROCEDURE — 90471 IMMUNIZATION ADMIN: CPT | Performed by: FAMILY MEDICINE

## 2019-11-01 PROCEDURE — 99213 OFFICE O/P EST LOW 20 MIN: CPT | Performed by: FAMILY MEDICINE

## 2019-11-01 NOTE — PROGRESS NOTES
Holden Avalos is a 68year old female. Patient presents with:  Leg Pain: bilateral leg pain, into both calfs-taking 2 aleve a day and it helps but doesnt take it away-pt has had this for a long time, just getting progressivly worse. ...room 1  Other: 2nd aspirin 81 MG Oral Chew Tab, Chew 81 mg by mouth daily. , Disp: , Rfl:   Cyanocobalamin (B-12) 500 MCG Oral Tab, Take 1,000 Units by mouth., Disp: , Rfl:   ALBUTEROL SULFATE  (90 Base) MCG/ACT Inhalation Aero Soln, TAKE 2 PUFFS BY MOUTH EVERY 6 ARIEL Relation Age of Onset   • Hypertension Father    • Other (Other) Father         LEUKEMIA   • Hypertension Mother    • Breast Cancer Mother    • Other (Other) Mother         COPD   • Hypertension Brother    • Other (Other) Brother    • Diabetes Maternal Aun Prescriptions      No prescriptions requested or ordered in this encounter       Imaging & Consults:  OP REFERRAL TO [facility]  Formerly [facility],[address] [facility] Erasto Murillo

## 2019-11-04 ENCOUNTER — APPOINTMENT (OUTPATIENT)
Dept: PHYSICAL THERAPY | Age: 69
End: 2019-11-04
Attending: FAMILY MEDICINE
Payer: MEDICARE

## 2019-11-07 ENCOUNTER — APPOINTMENT (OUTPATIENT)
Dept: PHYSICAL THERAPY | Age: 69
End: 2019-11-07
Attending: FAMILY MEDICINE
Payer: MEDICARE

## 2019-11-11 ENCOUNTER — OFFICE VISIT (OUTPATIENT)
Dept: PHYSICAL THERAPY | Age: 69
End: 2019-11-11
Attending: FAMILY MEDICINE
Payer: MEDICARE

## 2019-11-11 DIAGNOSIS — M48.062 SPINAL STENOSIS OF LUMBAR REGION WITH NEUROGENIC CLAUDICATION: ICD-10-CM

## 2019-11-11 PROCEDURE — 97110 THERAPEUTIC EXERCISES: CPT

## 2019-11-11 PROCEDURE — 97162 PT EVAL MOD COMPLEX 30 MIN: CPT

## 2019-11-11 NOTE — PROGRESS NOTES
SPINE EVALUATION:   Referring Physician: Dr. Wale Arias  Diagnosis: LBP     Date of Service: 11/11/2019     PATIENT SUMMARY   Dane Ashford is a 68year old female who presents to therapy today with complaints of low back pain. Radiates into both legs. consistent with diagnosis of Lumbar DDD/stenosis. Pt and PT discussed evaluation findings, pathology, POC and HEP. Pt voiced understanding and performs HEP correctly without reported pain.  Skilled Physical Therapy is medically necessary to address the abo making due to 1-2 personal factors/comorbidities, 3 body structures involved/activity limitations, and evolving symptoms including changing pain levels.   PLAN OF CARE:    Goals: (to be met in 10 visits)   · Pt will improve transversus abdominis recruitment

## 2019-11-15 ENCOUNTER — OFFICE VISIT (OUTPATIENT)
Dept: PHYSICAL THERAPY | Age: 69
End: 2019-11-15
Attending: FAMILY MEDICINE
Payer: MEDICARE

## 2019-11-15 DIAGNOSIS — M48.062 SPINAL STENOSIS OF LUMBAR REGION WITH NEUROGENIC CLAUDICATION: ICD-10-CM

## 2019-11-15 PROCEDURE — 97140 MANUAL THERAPY 1/> REGIONS: CPT

## 2019-11-15 PROCEDURE — 97110 THERAPEUTIC EXERCISES: CPT

## 2019-11-15 NOTE — PROGRESS NOTES
Dx: Lumbar stenosis         Insurance (Authorized # of Visits):  Med Necessity           Authorizing Physician: Dr. Wale Arias  Next MD visit: none scheduled  Fall Risk: standard         Precautions: n/a             Subjective: Was in car for 5 hours yester 3x10       MANUAL THERAPY  STM lumbar paraspinals and B QL in R SL 10'                     HEP: PPT, hip flexor stretch, clamshells    Charges: man therapy, therapeutic ex 2       Total Timed Treatment: 45 min  Total Treatment Time: 45 min

## 2019-11-19 ENCOUNTER — OFFICE VISIT (OUTPATIENT)
Dept: PHYSICAL THERAPY | Age: 69
End: 2019-11-19
Attending: FAMILY MEDICINE
Payer: MEDICARE

## 2019-11-19 PROCEDURE — 97110 THERAPEUTIC EXERCISES: CPT

## 2019-11-19 PROCEDURE — 97140 MANUAL THERAPY 1/> REGIONS: CPT

## 2019-11-19 NOTE — PROGRESS NOTES
Dx: Lumbar stenosis         Insurance (Authorized # of Visits):  Med Necessity           Authorizing Physician: Dr. Avila Presumsukhwinder  Next MD visit: none scheduled  Fall Risk: standard         Precautions: n/a             Subjective: Aches today, not sure if it i 3x30\" B  Glute stretch 3x30\" B  Rectus/IP stretch 3x30\" B  Clamshells 2x10 B  PPT 3x10 THERAPEUTIC EX  Nu-step L5 8'  Gastroc stretch 3x30\" B  Hamstring stretch 3x30\" B  Glute stretch 3x30\" B  Rectus/IP stretch 3x30\" B  Clamshells 2x10 B  PPT with h

## 2019-11-20 ENCOUNTER — TELEPHONE (OUTPATIENT)
Dept: FAMILY MEDICINE CLINIC | Facility: CLINIC | Age: 69
End: 2019-11-20

## 2019-11-22 ENCOUNTER — OFFICE VISIT (OUTPATIENT)
Dept: PHYSICAL THERAPY | Age: 69
End: 2019-11-22
Attending: FAMILY MEDICINE
Payer: MEDICARE

## 2019-11-22 DIAGNOSIS — M48.062 SPINAL STENOSIS OF LUMBAR REGION WITH NEUROGENIC CLAUDICATION: ICD-10-CM

## 2019-11-22 PROCEDURE — 97140 MANUAL THERAPY 1/> REGIONS: CPT

## 2019-11-22 PROCEDURE — 97110 THERAPEUTIC EXERCISES: CPT

## 2019-11-22 NOTE — PROGRESS NOTES
Dx: Lumbar stenosis         Insurance (Authorized # of Visits):  Med Necessity           Authorizing Physician: Dr. Wyatt Vallecillo  Next MD visit: none scheduled  Fall Risk: standard         Precautions: n/a             Subjective: Forgot to take CBD oil today EX  Nu-step L5 8'  Gastroc stretch 3x30\" B  Hamstring stretch 3x30\" B  Glute stretch 3x30\" B  Rectus/IP stretch 3x30\" B  Clamshells 2x10 B  PPT 3x10 THERAPEUTIC EX  Nu-step L5 8'  Gastroc stretch 3x30\" B  Hamstring stretch 3x30\" B  Glute stretch 3x30

## 2019-11-25 ENCOUNTER — APPOINTMENT (OUTPATIENT)
Dept: PHYSICAL THERAPY | Age: 69
End: 2019-11-25
Attending: FAMILY MEDICINE
Payer: MEDICARE

## 2019-11-29 ENCOUNTER — OFFICE VISIT (OUTPATIENT)
Dept: PHYSICAL THERAPY | Age: 69
End: 2019-11-29
Attending: FAMILY MEDICINE
Payer: MEDICARE

## 2019-11-29 DIAGNOSIS — M48.062 SPINAL STENOSIS OF LUMBAR REGION WITH NEUROGENIC CLAUDICATION: ICD-10-CM

## 2019-11-29 PROCEDURE — 97110 THERAPEUTIC EXERCISES: CPT

## 2019-11-29 PROCEDURE — 97140 MANUAL THERAPY 1/> REGIONS: CPT

## 2019-11-29 NOTE — PROGRESS NOTES
Dx: Lumbar stenosis         Insurance (Authorized # of Visits):  Med Necessity           Authorizing Physician: Dr. Robert Warren  Next MD visit: none scheduled  Fall Risk: standard         Precautions: n/a             Subjective: Sore from preparing for Thank B  Rectus/IP stretch 3x30\" B  Clamshells 2x10 B  PPT 3x10 THERAPEUTIC EX  Nu-step L5 8'  Gastroc stretch 3x30\" B  Hamstring stretch 3x30\" B  Glute stretch 3x30\" B  Rectus/IP stretch 3x30\" B  Clamshells 2x10 B  PPT with hip add squeeze 3x10  PPT with h

## 2019-12-03 ENCOUNTER — OFFICE VISIT (OUTPATIENT)
Dept: PHYSICAL THERAPY | Age: 69
End: 2019-12-03
Attending: FAMILY MEDICINE
Payer: MEDICARE

## 2019-12-03 PROCEDURE — 97110 THERAPEUTIC EXERCISES: CPT

## 2019-12-03 PROCEDURE — 97140 MANUAL THERAPY 1/> REGIONS: CPT

## 2019-12-03 NOTE — PROGRESS NOTES
Dx: Lumbar stenosis         Insurance (Authorized # of Visits):  Med Necessity           Authorizing Physician: Dr. Arian Larsen MD visit: none scheduled  Fall Risk: standard         Precautions: n/a             Subjective: Soreness throughout the LB an THERAPEUTIC EX  Nu-step L5 8'  Gastroc stretch 3x30\" B  Hamstring stretch 3x30\" B  Glute stretch 3x30\" B  Rectus/IP stretch 3x30\" B  Clamshells 2x10 B  PPT 3x10 THERAPEUTIC EX  Nu-step L5 8'  Gastroc stretch 3x30\" B  Hamstring stretch 3x30\" B  Glut

## 2019-12-05 ENCOUNTER — OFFICE VISIT (OUTPATIENT)
Dept: PHYSICAL THERAPY | Age: 69
End: 2019-12-05
Attending: FAMILY MEDICINE
Payer: MEDICARE

## 2019-12-05 PROCEDURE — 97110 THERAPEUTIC EXERCISES: CPT

## 2019-12-05 PROCEDURE — 97140 MANUAL THERAPY 1/> REGIONS: CPT

## 2019-12-05 NOTE — PROGRESS NOTES
Dx: Lumbar stenosis         Insurance (Authorized # of Visits):  Med Necessity           Authorizing Physician: Dr. Mike Larsen MD visit: none scheduled  Fall Risk: standard         Precautions: n/a             Subjective: Feeling better overall.  No lo EX  Nu-step L5 8'  Gastroc stretch 3x30\" B  Hamstring stretch 3x30\" B  Glute stretch 3x30\" B  Rectus/IP stretch 3x30\" B  Clamshells 2x10 B  PPT 3x10 THERAPEUTIC EX  Nu-step L5 8'  Gastroc stretch 3x30\" B  Hamstring stretch 3x30\" B  Glute stretch 3x30 min

## 2019-12-10 ENCOUNTER — OFFICE VISIT (OUTPATIENT)
Dept: PHYSICAL THERAPY | Age: 69
End: 2019-12-10
Attending: FAMILY MEDICINE
Payer: MEDICARE

## 2019-12-10 PROCEDURE — 97140 MANUAL THERAPY 1/> REGIONS: CPT

## 2019-12-10 PROCEDURE — 97110 THERAPEUTIC EXERCISES: CPT

## 2019-12-10 NOTE — PROGRESS NOTES
Dx: Lumbar stenosis         Insurance (Authorized # of Visits):  Med Necessity           Authorizing Physician: Dr. Krish Jea-nBaptiste  Next MD visit: none scheduled  Fall Risk: standard         Precautions: n/a             Subjective: Continue to feel much better Date: 12/10/2019  Tx#: 8/10   THERAPEUTIC EX  Nu-step L5 8'  Gastroc stretch 3x30\" B  Hamstring stretch 3x30\" B  Glute stretch 3x30\" B  Rectus/IP stretch 3x30\" B  Clamshells 2x10 B  PPT with hip addClamshells 2x10 B  Bridge 2x10  Stand extension with T THERAPY  STM lumbar paraspinals and B QL in R SL 10'                   HEP: PPT, hip flexor stretch, clamshells, standing TA with shoulder ext tband    Charges: man therapy, therapeutic ex 2       Total Timed Treatment: 45 min  Total Treatment Time: 45 min

## 2019-12-17 ENCOUNTER — APPOINTMENT (OUTPATIENT)
Dept: PHYSICAL THERAPY | Age: 69
End: 2019-12-17
Attending: FAMILY MEDICINE
Payer: MEDICARE

## 2019-12-20 ENCOUNTER — APPOINTMENT (OUTPATIENT)
Dept: PHYSICAL THERAPY | Age: 69
End: 2019-12-20
Attending: FAMILY MEDICINE
Payer: MEDICARE

## 2019-12-27 ENCOUNTER — APPOINTMENT (OUTPATIENT)
Dept: PHYSICAL THERAPY | Age: 69
End: 2019-12-27
Attending: FAMILY MEDICINE
Payer: MEDICARE

## 2020-01-01 ENCOUNTER — EXTERNAL RECORD (OUTPATIENT)
Dept: OTHER | Age: 70
End: 2020-01-01

## 2020-01-07 RX ORDER — AMLODIPINE BESYLATE 10 MG/1
10 TABLET ORAL DAILY
Qty: 90 TABLET | Refills: 1 | Status: SHIPPED | OUTPATIENT
Start: 2020-01-07 | End: 2020-05-14

## 2020-01-07 RX ORDER — METOPROLOL SUCCINATE 100 MG/1
100 TABLET, EXTENDED RELEASE ORAL
Qty: 90 TABLET | Refills: 1 | Status: SHIPPED | OUTPATIENT
Start: 2020-01-07 | End: 2020-05-14

## 2020-01-07 RX ORDER — SIMVASTATIN 20 MG
20 TABLET ORAL NIGHTLY
Qty: 90 TABLET | Refills: 1 | Status: SHIPPED | OUTPATIENT
Start: 2020-01-07 | End: 2020-05-14

## 2020-01-07 NOTE — TELEPHONE ENCOUNTER
LOV: 11/1/19    LAST LAB: 08/03/19    LAST RX: Amlodipine 1/25/19, 90 tabs x 3 refills, Metoprolol 12/20/19, 90 tabs x 3 refills, Simvastatin 10/29/10, 90 tabs x 1 refill    Next OV:   Future Appointments   Date Time Provider Chandan Cody   4/20/2020

## 2020-01-17 ENCOUNTER — MED REC SCAN ONLY (OUTPATIENT)
Dept: FAMILY MEDICINE CLINIC | Facility: CLINIC | Age: 70
End: 2020-01-17

## 2020-01-20 ENCOUNTER — TELEPHONE (OUTPATIENT)
Dept: CARDIOLOGY | Age: 70
End: 2020-01-20

## 2020-01-20 DIAGNOSIS — I77.1 SUBCLAVIAN ARTERY STENOSIS (CMD): ICD-10-CM

## 2020-01-20 DIAGNOSIS — I65.23 CAROTID STENOSIS, ASYMPTOMATIC, BILATERAL: Primary | ICD-10-CM

## 2020-01-22 ENCOUNTER — OFFICE VISIT (OUTPATIENT)
Dept: FAMILY MEDICINE CLINIC | Facility: CLINIC | Age: 70
End: 2020-01-22
Payer: MEDICARE

## 2020-01-22 VITALS
HEIGHT: 61 IN | DIASTOLIC BLOOD PRESSURE: 70 MMHG | BODY MASS INDEX: 34.4 KG/M2 | HEART RATE: 65 BPM | WEIGHT: 182.19 LBS | SYSTOLIC BLOOD PRESSURE: 122 MMHG | RESPIRATION RATE: 16 BRPM

## 2020-01-22 DIAGNOSIS — J20.9 COPD (CHRONIC OBSTRUCTIVE PULMONARY DISEASE) WITH ACUTE BRONCHITIS (HCC): ICD-10-CM

## 2020-01-22 DIAGNOSIS — J44.0 COPD (CHRONIC OBSTRUCTIVE PULMONARY DISEASE) WITH ACUTE BRONCHITIS (HCC): ICD-10-CM

## 2020-01-22 DIAGNOSIS — B35.1 ONYCHOMYCOSIS: ICD-10-CM

## 2020-01-22 DIAGNOSIS — R49.0 HOARSE VOICE QUALITY: Primary | ICD-10-CM

## 2020-01-22 PROCEDURE — 99214 OFFICE O/P EST MOD 30 MIN: CPT | Performed by: FAMILY MEDICINE

## 2020-01-22 NOTE — PROGRESS NOTES
Karma Whyte is a 68year old female. Patient presents with:  Throat Problem: States \"gravely\" started about 4 months.   does use nicotene lozenges  Chest Congestion: feels like something is stuck in middle of chest.  Toe Pain: fungus in right big toe, (three) times daily. (Patient taking differently: Take 1 mg by mouth daily.  ), Disp: 36 capsule, Rfl: 0  Omega-3 Fatty Acids (FISH OIL) 1200 MG Oral Cap, Take  by mouth daily. , Disp: , Rfl:   aspirin 81 MG Oral Chew Tab, Chew 81 mg by mouth daily. , Disp: Mother    • Breast Cancer Mother    • Other (Other) Mother         COPD   • Hypertension Brother    • Other (Other) Brother    • Diabetes Maternal Aunt    • Ovarian Cancer Paternal Aunt    • Diabetes Maternal Uncle         Social History:  Social History quit smoking. Discussed the onychomycosis. She will try Vicks at night. No orders of the defined types were placed in this encounter.       Meds & Refills for this Visit:  Requested Prescriptions      No prescriptions requested or ordered in this encount

## 2020-02-12 ENCOUNTER — MED REC SCAN ONLY (OUTPATIENT)
Dept: FAMILY MEDICINE CLINIC | Facility: CLINIC | Age: 70
End: 2020-02-12

## 2020-02-12 ENCOUNTER — HOSPITAL ENCOUNTER (OUTPATIENT)
Dept: CT IMAGING | Facility: HOSPITAL | Age: 70
Discharge: HOME | End: 2020-02-12
Attending: INTERNAL MEDICINE
Payer: MEDICARE

## 2020-02-12 DIAGNOSIS — R91.8 MULTIPLE PULMONARY NODULES: ICD-10-CM

## 2020-02-12 PROCEDURE — 71250 CT THORAX DX C-: CPT | Performed by: INTERNAL MEDICINE

## 2020-02-27 ENCOUNTER — LAB ENCOUNTER (OUTPATIENT)
Dept: LAB | Age: 70
End: 2020-02-27
Attending: INTERNAL MEDICINE
Payer: MEDICARE

## 2020-02-27 DIAGNOSIS — K76.0 FATTY LIVER: ICD-10-CM

## 2020-02-27 PROBLEM — R10.9 ABDOMINAL PAIN: Status: ACTIVE | Noted: 2019-02-16

## 2020-02-27 PROBLEM — I77.1 SUBCLAVIAN ARTERY STENOSIS (HCC): Status: ACTIVE | Noted: 2019-07-18

## 2020-02-27 PROBLEM — I77.1 SUBCLAVIAN ARTERY STENOSIS: Status: ACTIVE | Noted: 2019-07-18

## 2020-02-27 PROBLEM — S32.009D CLOSED FRACTURE OF TRANSVERSE PROCESS OF LUMBAR VERTEBRA WITH ROUTINE HEALING: Status: ACTIVE | Noted: 2017-09-05

## 2020-02-27 LAB
ALBUMIN SERPL-MCNC: 4.1 G/DL (ref 3.4–5)
ALBUMIN/GLOB SERPL: 1.2 {RATIO} (ref 1–2)
ALP LIVER SERPL-CCNC: 78 U/L (ref 55–142)
ALT SERPL-CCNC: 20 U/L (ref 13–56)
ANION GAP SERPL CALC-SCNC: 3 MMOL/L (ref 0–18)
AST SERPL-CCNC: 16 U/L (ref 15–37)
BASOPHILS # BLD AUTO: 0.03 X10(3) UL (ref 0–0.2)
BASOPHILS NFR BLD AUTO: 0.4 %
BILIRUB SERPL-MCNC: 0.3 MG/DL (ref 0.1–2)
BUN BLD-MCNC: 14 MG/DL (ref 7–18)
BUN/CREAT SERPL: 21.9 (ref 10–20)
CALCIUM BLD-MCNC: 9.5 MG/DL (ref 8.5–10.1)
CHLORIDE SERPL-SCNC: 102 MMOL/L (ref 98–112)
CO2 SERPL-SCNC: 32 MMOL/L (ref 21–32)
CREAT BLD-MCNC: 0.64 MG/DL (ref 0.55–1.02)
DEPRECATED RDW RBC AUTO: 44.4 FL (ref 35.1–46.3)
EOSINOPHIL # BLD AUTO: 0.13 X10(3) UL (ref 0–0.7)
EOSINOPHIL NFR BLD AUTO: 1.7 %
ERYTHROCYTE [DISTWIDTH] IN BLOOD BY AUTOMATED COUNT: 13.1 % (ref 11–15)
GLOBULIN PLAS-MCNC: 3.5 G/DL (ref 2.8–4.4)
GLUCOSE BLD-MCNC: 97 MG/DL (ref 70–99)
HCT VFR BLD AUTO: 41.7 % (ref 35–48)
HGB BLD-MCNC: 13.6 G/DL (ref 12–16)
IMM GRANULOCYTES # BLD AUTO: 0.02 X10(3) UL (ref 0–1)
IMM GRANULOCYTES NFR BLD: 0.3 %
LYMPHOCYTES # BLD AUTO: 2.02 X10(3) UL (ref 1–4)
LYMPHOCYTES NFR BLD AUTO: 25.7 %
M PROTEIN MFR SERPL ELPH: 7.6 G/DL (ref 6.4–8.2)
MCH RBC QN AUTO: 30.2 PG (ref 26–34)
MCHC RBC AUTO-ENTMCNC: 32.6 G/DL (ref 31–37)
MCV RBC AUTO: 92.7 FL (ref 80–100)
MONOCYTES # BLD AUTO: 0.58 X10(3) UL (ref 0.1–1)
MONOCYTES NFR BLD AUTO: 7.4 %
NEUTROPHILS # BLD AUTO: 5.08 X10 (3) UL (ref 1.5–7.7)
NEUTROPHILS # BLD AUTO: 5.08 X10(3) UL (ref 1.5–7.7)
NEUTROPHILS NFR BLD AUTO: 64.5 %
OSMOLALITY SERPL CALC.SUM OF ELEC: 284 MOSM/KG (ref 275–295)
PATIENT FASTING Y/N/NP: NO
PLATELET # BLD AUTO: 164 10(3)UL (ref 150–450)
POTASSIUM SERPL-SCNC: 4.3 MMOL/L (ref 3.5–5.1)
RBC # BLD AUTO: 4.5 X10(6)UL (ref 3.8–5.3)
SODIUM SERPL-SCNC: 137 MMOL/L (ref 136–145)
WBC # BLD AUTO: 7.9 X10(3) UL (ref 4–11)

## 2020-02-27 PROCEDURE — 80053 COMPREHEN METABOLIC PANEL: CPT

## 2020-02-27 PROCEDURE — 36415 COLL VENOUS BLD VENIPUNCTURE: CPT

## 2020-02-27 PROCEDURE — 85025 COMPLETE CBC W/AUTO DIFF WBC: CPT

## 2020-03-05 ENCOUNTER — HOSPITAL ENCOUNTER (OUTPATIENT)
Dept: ULTRASOUND IMAGING | Age: 70
Discharge: HOME | End: 2020-03-05
Attending: INTERNAL MEDICINE
Payer: MEDICARE

## 2020-03-05 DIAGNOSIS — R10.11 RUQ PAIN: ICD-10-CM

## 2020-03-05 DIAGNOSIS — K76.0 FATTY LIVER: ICD-10-CM

## 2020-03-05 PROCEDURE — 76700 US EXAM ABDOM COMPLETE: CPT | Performed by: INTERNAL MEDICINE

## 2020-03-06 ENCOUNTER — HOSPITAL ENCOUNTER (OUTPATIENT)
Dept: GENERAL RADIOLOGY | Age: 70
Discharge: HOME | End: 2020-03-06
Attending: INTERNAL MEDICINE
Payer: MEDICARE

## 2020-03-06 DIAGNOSIS — R13.14 PHARYNGOESOPHAGEAL DYSPHAGIA: ICD-10-CM

## 2020-03-06 PROCEDURE — 74221 X-RAY XM ESOPHAGUS 2CNTRST: CPT | Performed by: INTERNAL MEDICINE

## 2020-03-24 ENCOUNTER — TELEPHONE (OUTPATIENT)
Dept: FAMILY MEDICINE CLINIC | Facility: CLINIC | Age: 70
End: 2020-03-24

## 2020-05-11 ENCOUNTER — NURSE ONLY (OUTPATIENT)
Dept: LAB | Facility: HOSPITAL | Age: 70
End: 2020-05-11
Payer: MEDICARE

## 2020-05-11 DIAGNOSIS — R12 HEARTBURN: ICD-10-CM

## 2020-05-11 LAB — SARS-COV-2 RNA RESP QL NAA+PROBE: NOT DETECTED

## 2020-05-13 ENCOUNTER — ANESTHESIA EVENT (OUTPATIENT)
Dept: ENDOSCOPY | Facility: HOSPITAL | Age: 70
End: 2020-05-13
Payer: MEDICARE

## 2020-05-13 ENCOUNTER — ANESTHESIA (OUTPATIENT)
Dept: ENDOSCOPY | Facility: HOSPITAL | Age: 70
End: 2020-05-13
Payer: MEDICARE

## 2020-05-13 ENCOUNTER — HOSPITAL ENCOUNTER (OUTPATIENT)
Facility: HOSPITAL | Age: 70
Setting detail: HOSPITAL OUTPATIENT SURGERY
Discharge: HOME | End: 2020-05-13
Attending: INTERNAL MEDICINE | Admitting: INTERNAL MEDICINE
Payer: MEDICARE

## 2020-05-13 VITALS
WEIGHT: 178 LBS | TEMPERATURE: 98 F | HEIGHT: 62 IN | HEART RATE: 63 BPM | OXYGEN SATURATION: 96 % | RESPIRATION RATE: 17 BRPM | DIASTOLIC BLOOD PRESSURE: 59 MMHG | SYSTOLIC BLOOD PRESSURE: 103 MMHG | BODY MASS INDEX: 32.76 KG/M2

## 2020-05-13 DIAGNOSIS — R12 HEARTBURN: Primary | ICD-10-CM

## 2020-05-13 PROCEDURE — 0DB38ZX EXCISION OF LOWER ESOPHAGUS, VIA NATURAL OR ARTIFICIAL OPENING ENDOSCOPIC, DIAGNOSTIC: ICD-10-PCS | Performed by: INTERNAL MEDICINE

## 2020-05-13 PROCEDURE — 0DB68ZX EXCISION OF STOMACH, VIA NATURAL OR ARTIFICIAL OPENING ENDOSCOPIC, DIAGNOSTIC: ICD-10-PCS | Performed by: INTERNAL MEDICINE

## 2020-05-13 PROCEDURE — 0DB18ZX EXCISION OF UPPER ESOPHAGUS, VIA NATURAL OR ARTIFICIAL OPENING ENDOSCOPIC, DIAGNOSTIC: ICD-10-PCS | Performed by: INTERNAL MEDICINE

## 2020-05-13 PROCEDURE — 88305 TISSUE EXAM BY PATHOLOGIST: CPT | Performed by: INTERNAL MEDICINE

## 2020-05-13 RX ORDER — SODIUM CHLORIDE, SODIUM LACTATE, POTASSIUM CHLORIDE, CALCIUM CHLORIDE 600; 310; 30; 20 MG/100ML; MG/100ML; MG/100ML; MG/100ML
INJECTION, SOLUTION INTRAVENOUS CONTINUOUS
Status: DISCONTINUED | OUTPATIENT
Start: 2020-05-13 | End: 2020-05-13

## 2020-05-13 RX ORDER — NALOXONE HYDROCHLORIDE 0.4 MG/ML
80 INJECTION, SOLUTION INTRAMUSCULAR; INTRAVENOUS; SUBCUTANEOUS AS NEEDED
Status: CANCELLED | OUTPATIENT
Start: 2020-05-13 | End: 2020-05-13

## 2020-05-13 RX ORDER — EPHEDRINE SULFATE 50 MG/ML
INJECTION, SOLUTION INTRAVENOUS AS NEEDED
Status: DISCONTINUED | OUTPATIENT
Start: 2020-05-13 | End: 2020-05-13 | Stop reason: SURG

## 2020-05-13 RX ADMIN — EPHEDRINE SULFATE 10 MG: 50 INJECTION, SOLUTION INTRAVENOUS at 08:50:00

## 2020-05-13 RX ADMIN — SODIUM CHLORIDE, SODIUM LACTATE, POTASSIUM CHLORIDE, CALCIUM CHLORIDE: 600; 310; 30; 20 INJECTION, SOLUTION INTRAVENOUS at 08:59:00

## 2020-05-13 NOTE — ANESTHESIA PREPROCEDURE EVALUATION
PRE-OP EVALUATION    Patient Name: Dane Ashford    Pre-op Diagnosis: Heartburn [R12]    Procedure(s):  ESOPHAGOGASTRODUODENOSCOPY    Surgeon(s) and Role:     * Cesar Perdue, DO - Primary    Pre-op vitals reviewed.   Temp: 97.8 °F (36.6 °C)  Resp: 17 Evaluation    Patient summary reviewed. Anesthetic Complications  (+) history of anesthetic complications  History of: difficult airway       GI/Hepatic/Renal  Comment: esophagitis    (+) GERD                           Cardiovascular      ECG reviewed. 0.64 02/27/2020    GLU 97 02/27/2020    CA 9.5 02/27/2020            Airway      Mallampati: III  Mouth opening: 3 FB  TM distance: 4 - 6 cm  Neck ROM: full Cardiovascular    Cardiovascular exam normal.         Dental    No notable dental history.

## 2020-05-13 NOTE — H&P
History & Physical Examination    Patient Name: Ignacia Mcintosh  MRN: [id number]  CSN: [id number]  YOB: 1950    Diagnosis: dysphagia    Present Illness: 67 y/o F history as above presents for EGD with biopsy.      clonazePAM 0.5 MG Oral Tab, Take time      lactated ringers infusion, , Intravenous, Continuous        Allergies:    Ace Inhibitors          OTHER (SEE COMMENTS)    Comment:Can't recall  Keflex                  HIVES  Lexapro                 OTHER (SEE COMMENTS)    Comment:\"didn't agree w • MASTECTOMY RIGHT      has implants-not due to CA   • ORTHOPEDIC SURG (PBP)      back surgery   • OTHER SURGICAL HISTORY      herniated disc     Family History   Problem Relation Age of Onset   • Hypertension Father    • Other (Other) Father         MARGARET

## 2020-05-13 NOTE — ANESTHESIA POSTPROCEDURE EVALUATION
Geovanni 24 Patient Status:  Hospital Outpatient Surgery   Age/Gender 68year old female MRN [id number]   Location [address] [facility]. Attending Douglas Crouch, [address] [facility] Day # 0 PCP Anatoliy Guerra DO       Anesthesia Post-

## 2020-05-13 NOTE — OPERATIVE REPORT
My Miranda Patient Status:  Hospital Outpatient Surgery    1950 MRN [id number]   [facility] ENDOSCOPY Attending Adriane Weiss DO   Hosp Day # 0 PCP Judy Duff DO       PREOPERATIVE DIAGNOSIS/INDICATION: [id number] RECOMMENDATIONS:    1. Follow up pathology results. 2. Follow up with Dr. Kostas Chaudhary.      Moraima Borrero  Gastroenterology/Advanced Endoscopy  [facility]

## 2020-05-14 RX ORDER — SIMVASTATIN 20 MG
20 TABLET ORAL NIGHTLY
Qty: 90 TABLET | Refills: 0 | Status: SHIPPED | OUTPATIENT
Start: 2020-05-14 | End: 2020-07-20

## 2020-05-14 RX ORDER — METOPROLOL SUCCINATE 100 MG/1
100 TABLET, EXTENDED RELEASE ORAL
Qty: 90 TABLET | Refills: 0 | Status: SHIPPED | OUTPATIENT
Start: 2020-05-14 | End: 2020-07-20

## 2020-05-14 RX ORDER — AMLODIPINE BESYLATE 10 MG/1
10 TABLET ORAL DAILY
Qty: 90 TABLET | Refills: 0 | Status: SHIPPED | OUTPATIENT
Start: 2020-05-14 | End: 2020-07-20

## 2020-05-14 NOTE — TELEPHONE ENCOUNTER
Amlodipine   Last refill: 01/07/20  Qty: 90  W/ 1 refills  Last ov: 01/22/20      Hypertension Medications Protocol Passed5/14 2:02 PM   CMP or BMP in past 12 months    Last serum creatinine< 2.0    Appointment in past 6 or next 3 months         Esther

## 2020-06-02 ENCOUNTER — APPOINTMENT (OUTPATIENT)
Dept: LAB | Age: 70
End: 2020-06-02
Attending: FAMILY MEDICINE
Payer: MEDICARE

## 2020-06-02 DIAGNOSIS — R73.01 IMPAIRED FASTING GLUCOSE: ICD-10-CM

## 2020-06-02 DIAGNOSIS — I10 ESSENTIAL HYPERTENSION: ICD-10-CM

## 2020-06-02 DIAGNOSIS — I10 ESSENTIAL HYPERTENSION: Primary | ICD-10-CM

## 2020-06-02 PROCEDURE — 36415 COLL VENOUS BLD VENIPUNCTURE: CPT

## 2020-06-02 PROCEDURE — 83036 HEMOGLOBIN GLYCOSYLATED A1C: CPT

## 2020-06-02 PROCEDURE — 80053 COMPREHEN METABOLIC PANEL: CPT

## 2020-06-02 PROCEDURE — 80061 LIPID PANEL: CPT

## 2020-06-03 DIAGNOSIS — I10 ESSENTIAL HYPERTENSION: ICD-10-CM

## 2020-06-03 DIAGNOSIS — E78.5 HYPERLIPIDEMIA, UNSPECIFIED HYPERLIPIDEMIA TYPE: Primary | ICD-10-CM

## 2020-06-03 DIAGNOSIS — R73.01 IMPAIRED FASTING GLUCOSE: ICD-10-CM

## 2020-06-04 ENCOUNTER — TELEPHONE (OUTPATIENT)
Dept: FAMILY MEDICINE CLINIC | Facility: CLINIC | Age: 70
End: 2020-06-04

## 2020-06-04 NOTE — TELEPHONE ENCOUNTER
PT. REQUESTING WE FAX HER LAB RESULTS TO HER PSYCHIARTRIST   [id number] [facility]   [address] [address]: [phone]  [phone]

## 2020-06-10 ENCOUNTER — OFFICE VISIT (OUTPATIENT)
Dept: FAMILY MEDICINE CLINIC | Facility: CLINIC | Age: 70
End: 2020-06-10
Payer: MEDICARE

## 2020-06-10 VITALS
HEART RATE: 68 BPM | HEIGHT: 62 IN | TEMPERATURE: 98 F | SYSTOLIC BLOOD PRESSURE: 138 MMHG | DIASTOLIC BLOOD PRESSURE: 72 MMHG | OXYGEN SATURATION: 97 % | WEIGHT: 178 LBS | BODY MASS INDEX: 32.76 KG/M2 | RESPIRATION RATE: 18 BRPM

## 2020-06-10 DIAGNOSIS — M25.469 JOINT SWELLING OF LOWER LEG: Primary | ICD-10-CM

## 2020-06-10 PROCEDURE — 99213 OFFICE O/P EST LOW 20 MIN: CPT | Performed by: FAMILY MEDICINE

## 2020-06-10 NOTE — PROGRESS NOTES
Jose العراقي is a 68year old female. Patient presents with:  Leg Swelling: She fell when doing yard work she said her leg has swollen and hurts she said it happen May 31,20      HPI:   Right leg injured when she fell while doing yard work.   Jennifer Red prior to visit.         Past Medical History:   Diagnosis Date   • Anxiety state, unspecified    • Back problem     spinal stenosis lower back   • Cataract     starting   • COPD (chronic obstructive pulmonary disease) (HCC)    • Depression    • Difficult in Brother    • Other (Other) Brother    • Diabetes Maternal Aunt    • Ovarian Cancer Paternal Aunt    • Diabetes Maternal Uncle         Social History:  Social History    Tobacco Use      Smoking status: Former Smoker        Packs/day: 1.50        Years: 27.

## 2020-06-12 ENCOUNTER — TELEPHONE (OUTPATIENT)
Dept: FAMILY MEDICINE CLINIC | Facility: CLINIC | Age: 70
End: 2020-06-12

## 2020-06-26 ENCOUNTER — ORDER TRANSCRIPTION (OUTPATIENT)
Dept: PHYSICAL THERAPY | Facility: HOSPITAL | Age: 70
End: 2020-06-26

## 2020-06-26 DIAGNOSIS — J38.3 OTHER DISEASES OF VOCAL CORDS: Primary | ICD-10-CM

## 2020-07-01 ENCOUNTER — MED REC SCAN ONLY (OUTPATIENT)
Dept: FAMILY MEDICINE CLINIC | Facility: CLINIC | Age: 70
End: 2020-07-01

## 2020-07-10 NOTE — TELEPHONE ENCOUNTER
Attempted to reach pt, no answer. Unable to leave msg. We do not currently have samples of Santo or Breo.

## 2020-07-13 NOTE — TELEPHONE ENCOUNTER
Pt advised, she would like to have a prescription for Breo. States she will pay for it out of pocket.

## 2020-07-14 NOTE — TELEPHONE ENCOUNTER
LOV: 6/10/20    LAST LAB: 6/2/20    LAST RX 3/24/20     Next OV   Future Appointments   Date Time Provider Chandan Cody   7/15/2020  1:30 PM Otf Jackson DO [facility] [place]   7/23/2020 12:30 PM [facility] CARD PV ROOM 1 [facility] 43   7/23

## 2020-07-15 ENCOUNTER — TELEPHONE (OUTPATIENT)
Dept: FAMILY MEDICINE CLINIC | Facility: CLINIC | Age: 70
End: 2020-07-15

## 2020-07-15 ENCOUNTER — OFFICE VISIT (OUTPATIENT)
Dept: FAMILY MEDICINE CLINIC | Facility: CLINIC | Age: 70
End: 2020-07-15
Payer: MEDICARE

## 2020-07-15 VITALS
WEIGHT: 178.25 LBS | OXYGEN SATURATION: 96 % | DIASTOLIC BLOOD PRESSURE: 70 MMHG | BODY MASS INDEX: 32.8 KG/M2 | TEMPERATURE: 99 F | HEIGHT: 62 IN | SYSTOLIC BLOOD PRESSURE: 130 MMHG | HEART RATE: 56 BPM

## 2020-07-15 DIAGNOSIS — J44.0 COPD (CHRONIC OBSTRUCTIVE PULMONARY DISEASE) WITH ACUTE BRONCHITIS (HCC): ICD-10-CM

## 2020-07-15 DIAGNOSIS — E78.5 HYPERLIPIDEMIA, UNSPECIFIED HYPERLIPIDEMIA TYPE: ICD-10-CM

## 2020-07-15 DIAGNOSIS — I70.0 ATHEROSCLEROSIS OF AORTA (HCC): Primary | ICD-10-CM

## 2020-07-15 DIAGNOSIS — Z00.00 ENCOUNTER FOR ANNUAL HEALTH EXAMINATION: ICD-10-CM

## 2020-07-15 DIAGNOSIS — I77.1 SUBCLAVIAN ARTERY STENOSIS (HCC): ICD-10-CM

## 2020-07-15 DIAGNOSIS — R73.01 IMPAIRED FASTING GLUCOSE: ICD-10-CM

## 2020-07-15 DIAGNOSIS — Z12.31 VISIT FOR SCREENING MAMMOGRAM: ICD-10-CM

## 2020-07-15 DIAGNOSIS — I65.23 CAROTID STENOSIS, ASYMPTOMATIC, BILATERAL: ICD-10-CM

## 2020-07-15 DIAGNOSIS — J20.9 COPD (CHRONIC OBSTRUCTIVE PULMONARY DISEASE) WITH ACUTE BRONCHITIS (HCC): ICD-10-CM

## 2020-07-15 DIAGNOSIS — Z13.31 DEPRESSION SCREENING: ICD-10-CM

## 2020-07-15 DIAGNOSIS — I10 ESSENTIAL HYPERTENSION: ICD-10-CM

## 2020-07-15 PROBLEM — R10.9 ABDOMINAL PAIN: Status: RESOLVED | Noted: 2019-02-16 | Resolved: 2020-07-15

## 2020-07-15 PROBLEM — S32.009D CLOSED FRACTURE OF TRANSVERSE PROCESS OF LUMBAR VERTEBRA WITH ROUTINE HEALING: Status: RESOLVED | Noted: 2017-09-05 | Resolved: 2020-07-15

## 2020-07-15 PROBLEM — K52.9 NONINFECTIVE GASTROENTERITIS AND COLITIS, UNSPECIFIED: Status: RESOLVED | Noted: 2019-04-16 | Resolved: 2020-07-15

## 2020-07-15 PROCEDURE — 82570 ASSAY OF URINE CREATININE: CPT | Performed by: FAMILY MEDICINE

## 2020-07-15 PROCEDURE — 82043 UR ALBUMIN QUANTITATIVE: CPT | Performed by: FAMILY MEDICINE

## 2020-07-15 PROCEDURE — G0439 PPPS, SUBSEQ VISIT: HCPCS | Performed by: FAMILY MEDICINE

## 2020-07-15 PROCEDURE — G0444 DEPRESSION SCREEN ANNUAL: HCPCS | Performed by: FAMILY MEDICINE

## 2020-07-15 RX ORDER — ESOMEPRAZOLE MAGNESIUM 40 MG/1
40 CAPSULE, DELAYED RELEASE ORAL DAILY
COMMUNITY
Start: 2020-06-15 | End: 2020-12-07

## 2020-07-15 RX ORDER — AZELASTINE 1 MG/ML
2 SPRAY, METERED NASAL DAILY
Status: ON HOLD | COMMUNITY
Start: 2020-04-20 | End: 2021-01-12

## 2020-07-15 NOTE — TELEPHONE ENCOUNTER
ADRIAN    SHE SAID THAT THIS NEEDS TO BE CALLED TO [facility] [place]

## 2020-07-15 NOTE — PATIENT INSTRUCTIONS
Leann Chatman's SCREENING SCHEDULE   Tests on this list are recommended by your physician but may not be covered, or covered at this frequency, by your insurer. Please check with your insurance carrier before scheduling to verify coverage.    PREVENTATIV patients who meet one of the following criteria:   • Men who are 73-68 years old and have smoked more than 100 cigarettes in their lifetime   • Anyone with a family history    Colorectal Cancer Screening  Covered up to Age 76     Colonoscopy Screen   Cover Immunizations      Influenza  Covered Annually Orders placed or performed in visit on 10/11/16   • FLU VACC [address] ANTIG   Orders placed or performed in visit on 10/29/15   • INFLUENZA VIRUS VACCINE, PRESERV FREE, >=1YEARS OF AGE   Orders placed Directives    [employer]. org/publications/[email or web address]/personal_[email or web address]. pdf  An information packet, including necessary form from the [employer]â„[employer] 2 website. http://www. [place]ph.state. [place]/public/books/advin.htm  A link to the [place]

## 2020-07-15 NOTE — TELEPHONE ENCOUNTER
Script for [employer] sent to [facility] on 7/14. Called [facility] they stated that script was not received. Verbal given for Breo-Ellipta 100-25 mcg. Advised pt refill is in process.

## 2020-07-15 NOTE — TELEPHONE ENCOUNTER
Fluticasone Furoate-Vilanterol (BREO ELLIPTA) 100-25 MCG/INH  IS NOT COVERED BY HER INSURANCE    AMYLOGSTEPHANIE ELLIPTA IS COVERED BY HER INSURANCE  CALL TO [facility] FOR 90 DAY SUPPLY WITH REFILLS IF POSSIBLE.

## 2020-07-15 NOTE — PROGRESS NOTES
HPI:   My Miranda is a 65year old female who presents for a Medicare Subsequent Annual Wellness visit (Pt already had Initial Annual Wellness).     No complaints           Fall/Risk Assessment   She has been screened for Falls and is low risk: Fall/R Paul    Patient Active Problem List:     HTN (hypertension)     Hyperlipidemia     Impaired fasting glucose     COPD (chronic obstructive pulmonary disease) with acute bronchitis (HCC)     Atherosclerosis of aorta (HCC)     Noninfective gastroenteritis a NIGHTLY. METOPROLOL SUCCINATE  MG Oral Tablet 24 Hr, TAKE 1 TABLET (100 MG TOTAL) BY MOUTH ONCE DAILY. clonazePAM 0.5 MG Oral Tab, Take 0.5 mg by mouth daily.     ALBUTEROL SULFATE  (90 Base) MCG/ACT Inhalation Aero Soln, TAKE 2 PUFFS BY MOUT surgery (09/18/2017); and colonoscopy. Her family history includes Breast Cancer in her mother; Diabetes in her maternal aunt and maternal uncle; Hypertension in her brother, father, and mother;  Other in her brother, father, and mother; Ovarian Cancer i mucosa, and tongue normal; teeth and gums normal   Neck: Supple, symmetrical, trachea midline, no adenopathy;  thyroid: not enlarged, symmetric, no tenderness/mass/nodules; no carotid bruit or JVD   Back:   Symmetric, no curvature, ROM normal, no CVA tende screening    - DEPRESSION SCREEN ANNUAL    4. Atherosclerosis of aorta (HCC)  Controlled and stable      5. Carotid stenosis, asymptomatic, bilateral  Controlled and stable      6.  COPD (chronic obstructive pulmonary disease) with acute bronchitis ([facility] [address]  C Cholesterol (mg/dL)   Date Value   06/02/2020 68     LDL CHOLESTROL (mg/dL)   Date Value   03/10/2014 105        EKG - w/ Initial Preventative Physical Exam only, or if medically necessary Electrocardiogram date       Colorectal Cancer Screening      Colon Illicit injectable drug abusers     Tetanus Toxoid  Only covered with a cut with metal- TD and TDaP Not covered by Medicare Part B 07/07/2006 This may be covered with your prescription benefits, but Medicare does not cover unless Medically needed    Zost

## 2020-07-20 RX ORDER — SIMVASTATIN 20 MG
20 TABLET ORAL NIGHTLY
Qty: 90 TABLET | Refills: 1 | Status: SHIPPED | OUTPATIENT
Start: 2020-07-20 | End: 2021-03-04

## 2020-07-20 RX ORDER — AMLODIPINE BESYLATE 10 MG/1
10 TABLET ORAL DAILY
Qty: 90 TABLET | Refills: 1 | Status: SHIPPED | OUTPATIENT
Start: 2020-07-20 | End: 2021-04-10

## 2020-07-20 RX ORDER — METOPROLOL SUCCINATE 100 MG/1
100 TABLET, EXTENDED RELEASE ORAL
Qty: 90 TABLET | Refills: 1 | Status: ON HOLD | OUTPATIENT
Start: 2020-07-20 | End: 2021-01-13

## 2020-07-20 NOTE — TELEPHONE ENCOUNTER
Last office visit:  07/15/20  Last cmp:  06/02/20  Last lipid:  06/02/20  Last bp:  07/15/20   130/70    Simvastatin:  05/14/20   #90, no refills  Amlodipine: 05/14/20  #90, no refills  Metoprolol:  05/14/20   #90, no refills

## 2020-07-21 NOTE — TELEPHONE ENCOUNTER
Ok to order symbicort 160-4.5 mcg, 1-2 puffs into lungs BID per Dr. Luanne Ivan. Spoke to Vera, states she would like to stick with [employer] and is willing to pay $100 for it. Inhaler has been ordered and on it's way.

## 2020-07-23 ENCOUNTER — HOSPITAL ENCOUNTER (OUTPATIENT)
Dept: CARDIOLOGY CLINIC | Facility: HOSPITAL | Age: 70
Discharge: HOME | End: 2020-07-23
Attending: INTERNAL MEDICINE
Payer: MEDICARE

## 2020-07-23 DIAGNOSIS — I77.1 SUBCLAVIAN ARTERY STENOSIS (HCC): ICD-10-CM

## 2020-07-23 DIAGNOSIS — I65.23 ASYMPTOMATIC CAROTID ARTERY STENOSIS, BILATERAL: ICD-10-CM

## 2020-07-23 PROCEDURE — 93880 EXTRACRANIAL BILAT STUDY: CPT | Performed by: INTERNAL MEDICINE

## 2020-07-23 PROCEDURE — 93930 UPPER EXTREMITY STUDY: CPT | Performed by: INTERNAL MEDICINE

## 2020-07-27 ENCOUNTER — MED REC SCAN ONLY (OUTPATIENT)
Dept: FAMILY MEDICINE CLINIC | Facility: CLINIC | Age: 70
End: 2020-07-27

## 2020-07-27 ENCOUNTER — OFFICE VISIT (OUTPATIENT)
Dept: CARDIOLOGY | Age: 70
End: 2020-07-27

## 2020-07-27 VITALS
DIASTOLIC BLOOD PRESSURE: 70 MMHG | HEART RATE: 62 BPM | WEIGHT: 175 LBS | SYSTOLIC BLOOD PRESSURE: 124 MMHG | HEIGHT: 62 IN | BODY MASS INDEX: 32.2 KG/M2

## 2020-07-27 DIAGNOSIS — I65.23 CAROTID STENOSIS, ASYMPTOMATIC, BILATERAL: ICD-10-CM

## 2020-07-27 DIAGNOSIS — I10 ESSENTIAL HYPERTENSION: ICD-10-CM

## 2020-07-27 DIAGNOSIS — I77.1 SUBCLAVIAN ARTERY STENOSIS (CMD): Primary | ICD-10-CM

## 2020-07-27 DIAGNOSIS — E78.2 HYPERLIPIDEMIA, MIXED: ICD-10-CM

## 2020-07-27 DIAGNOSIS — Z98.890 HISTORY OF RIGHT-SIDED CAROTID ENDARTERECTOMY: ICD-10-CM

## 2020-07-27 DIAGNOSIS — R60.1 GENERALIZED EDEMA: ICD-10-CM

## 2020-07-27 PROCEDURE — 99442 TELEPHONE E&M BY PHYSICIAN EST PT NOT ORIG PREV 7 DAYS 11-20 MIN: CPT | Performed by: INTERNAL MEDICINE

## 2020-07-27 RX ORDER — FUROSEMIDE 20 MG/1
20 TABLET ORAL EVERY OTHER DAY
Qty: 20 TABLET | Refills: 3 | Status: SHIPPED | OUTPATIENT
Start: 2020-07-27 | End: 2020-09-28 | Stop reason: CLARIF

## 2020-07-27 RX ORDER — FLUTICASONE FUROATE, UMECLIDINIUM BROMIDE AND VILANTEROL TRIFENATATE 100; 62.5; 25 UG/1; UG/1; UG/1
POWDER RESPIRATORY (INHALATION) PRN
COMMUNITY
Start: 2020-07-16

## 2020-07-27 RX ORDER — AZELASTINE 1 MG/ML
2 SPRAY, METERED NASAL PRN
COMMUNITY
Start: 2020-04-20 | End: 2020-09-28 | Stop reason: CLARIF

## 2020-07-27 RX ORDER — NAPROXEN SODIUM 220 MG
220 TABLET ORAL PRN
COMMUNITY

## 2020-07-27 RX ORDER — FUROSEMIDE 20 MG/1
20 TABLET ORAL EVERY OTHER DAY
COMMUNITY
End: 2020-07-27 | Stop reason: SDUPTHER

## 2020-07-27 RX ORDER — OMEPRAZOLE 40 MG/1
40 CAPSULE, DELAYED RELEASE ORAL DAILY
COMMUNITY
Start: 2020-05-26 | End: 2020-11-18 | Stop reason: DRUGHIGH

## 2020-07-27 ASSESSMENT — ENCOUNTER SYMPTOMS
HEMATOCHEZIA: 0
BRUISES/BLEEDS EASILY: 0
HEMOPTYSIS: 0
CHILLS: 0
ALLERGIC/IMMUNOLOGIC COMMENTS: NO NEW FOOD ALLERGIES
WEIGHT LOSS: 0
FEVER: 0
SUSPICIOUS LESIONS: 0
COUGH: 0
WEIGHT GAIN: 0

## 2020-07-27 ASSESSMENT — PATIENT HEALTH QUESTIONNAIRE - PHQ9
SUM OF ALL RESPONSES TO PHQ9 QUESTIONS 1 AND 2: 0
CLINICAL INTERPRETATION OF PHQ9 SCORE: NO FURTHER SCREENING NEEDED
2. FEELING DOWN, DEPRESSED OR HOPELESS: NOT AT ALL
SUM OF ALL RESPONSES TO PHQ9 QUESTIONS 1 AND 2: 0
CLINICAL INTERPRETATION OF PHQ2 SCORE: NO FURTHER SCREENING NEEDED
1. LITTLE INTEREST OR PLEASURE IN DOING THINGS: NOT AT ALL

## 2020-07-28 ENCOUNTER — TELEPHONE (OUTPATIENT)
Dept: CARDIOLOGY | Age: 70
End: 2020-07-28

## 2020-07-28 DIAGNOSIS — I65.23 CAROTID STENOSIS, ASYMPTOMATIC, BILATERAL: ICD-10-CM

## 2020-07-28 DIAGNOSIS — I77.1 SUBCLAVIAN ARTERY STENOSIS (CMD): Primary | ICD-10-CM

## 2020-07-28 DIAGNOSIS — I10 ESSENTIAL HYPERTENSION: ICD-10-CM

## 2020-07-28 DIAGNOSIS — Z98.890 HISTORY OF RIGHT-SIDED CAROTID ENDARTERECTOMY: ICD-10-CM

## 2020-08-03 ENCOUNTER — OFFICE VISIT (OUTPATIENT)
Dept: SPEECH THERAPY | Facility: HOSPITAL | Age: 70
End: 2020-08-03
Attending: OTOLARYNGOLOGY
Payer: MEDICARE

## 2020-08-03 DIAGNOSIS — J38.3 OTHER DISEASES OF VOCAL CORDS: ICD-10-CM

## 2020-08-03 PROCEDURE — 92524 BEHAVRAL QUALIT ANALYS VOICE: CPT

## 2020-08-04 ENCOUNTER — TELEPHONE (OUTPATIENT)
Dept: FAMILY MEDICINE CLINIC | Facility: CLINIC | Age: 70
End: 2020-08-04

## 2020-08-04 NOTE — TELEPHONE ENCOUNTER
Receiving multiple requests to switch pt's inhaler to a preferred drug. Spoke to pt again to confirm this is not a new request. Per Nathen Vazquez, she does not need a prescription for a new inhaler.      Attempted to reach [facility] to cancel request. Left detail

## 2020-08-06 ENCOUNTER — APPOINTMENT (OUTPATIENT)
Dept: SPEECH THERAPY | Facility: HOSPITAL | Age: 70
End: 2020-08-06
Attending: OTOLARYNGOLOGY
Payer: MEDICARE

## 2020-08-06 ENCOUNTER — HOSPITAL ENCOUNTER (OUTPATIENT)
Dept: CV DIAGNOSTICS | Facility: HOSPITAL | Age: 70
Discharge: HOME | End: 2020-08-06
Attending: INTERNAL MEDICINE
Payer: MEDICARE

## 2020-08-06 DIAGNOSIS — I10 HYPERTENSION, ESSENTIAL: ICD-10-CM

## 2020-08-06 DIAGNOSIS — E78.2 MIXED HYPERLIPIDEMIA: ICD-10-CM

## 2020-08-06 DIAGNOSIS — R60.1 GENERALIZED EDEMA: ICD-10-CM

## 2020-08-06 PROCEDURE — 93306 TTE W/DOPPLER COMPLETE: CPT | Performed by: INTERNAL MEDICINE

## 2020-08-07 ENCOUNTER — TELEPHONE (OUTPATIENT)
Dept: CARDIOLOGY | Age: 70
End: 2020-08-07

## 2020-08-12 ENCOUNTER — APPOINTMENT (OUTPATIENT)
Dept: SPEECH THERAPY | Facility: HOSPITAL | Age: 70
End: 2020-08-12
Attending: OTOLARYNGOLOGY
Payer: MEDICARE

## 2020-08-14 ENCOUNTER — LAB ENCOUNTER (OUTPATIENT)
Dept: LAB | Age: 70
End: 2020-08-14
Attending: FAMILY MEDICINE
Payer: MEDICARE

## 2020-08-14 ENCOUNTER — APPOINTMENT (OUTPATIENT)
Dept: SPEECH THERAPY | Facility: HOSPITAL | Age: 70
End: 2020-08-14
Attending: OTOLARYNGOLOGY
Payer: MEDICARE

## 2020-08-14 DIAGNOSIS — K76.0 FATTY LIVER: ICD-10-CM

## 2020-08-14 LAB
ALBUMIN SERPL-MCNC: 4 G/DL (ref 3.4–5)
ALBUMIN/GLOB SERPL: 1.2 {RATIO} (ref 1–2)
ALP LIVER SERPL-CCNC: 92 U/L (ref 55–142)
ALT SERPL-CCNC: 22 U/L (ref 13–56)
ANION GAP SERPL CALC-SCNC: 3 MMOL/L (ref 0–18)
AST SERPL-CCNC: 21 U/L (ref 15–37)
BASOPHILS # BLD AUTO: 0.04 X10(3) UL (ref 0–0.2)
BASOPHILS NFR BLD AUTO: 0.5 %
BILIRUB SERPL-MCNC: 0.5 MG/DL (ref 0.1–2)
BUN BLD-MCNC: 10 MG/DL (ref 7–18)
BUN/CREAT SERPL: 16.9 (ref 10–20)
CALCIUM BLD-MCNC: 9.1 MG/DL (ref 8.5–10.1)
CHLORIDE SERPL-SCNC: 99 MMOL/L (ref 98–112)
CO2 SERPL-SCNC: 32 MMOL/L (ref 21–32)
CREAT BLD-MCNC: 0.59 MG/DL (ref 0.55–1.02)
DEPRECATED RDW RBC AUTO: 44.6 FL (ref 35.1–46.3)
EOSINOPHIL # BLD AUTO: 0.13 X10(3) UL (ref 0–0.7)
EOSINOPHIL NFR BLD AUTO: 1.7 %
ERYTHROCYTE [DISTWIDTH] IN BLOOD BY AUTOMATED COUNT: 13.2 % (ref 11–15)
GLOBULIN PLAS-MCNC: 3.3 G/DL (ref 2.8–4.4)
GLUCOSE BLD-MCNC: 106 MG/DL (ref 70–99)
HCT VFR BLD AUTO: 41.9 % (ref 35–48)
HGB BLD-MCNC: 13.6 G/DL (ref 12–16)
IMM GRANULOCYTES # BLD AUTO: 0.02 X10(3) UL (ref 0–1)
IMM GRANULOCYTES NFR BLD: 0.3 %
INR BLD: 0.94 (ref 0.89–1.11)
LYMPHOCYTES # BLD AUTO: 2.14 X10(3) UL (ref 1–4)
LYMPHOCYTES NFR BLD AUTO: 28.7 %
M PROTEIN MFR SERPL ELPH: 7.3 G/DL (ref 6.4–8.2)
MCH RBC QN AUTO: 30.1 PG (ref 26–34)
MCHC RBC AUTO-ENTMCNC: 32.5 G/DL (ref 31–37)
MCV RBC AUTO: 92.7 FL (ref 80–100)
MONOCYTES # BLD AUTO: 0.52 X10(3) UL (ref 0.1–1)
MONOCYTES NFR BLD AUTO: 7 %
NEUTROPHILS # BLD AUTO: 4.61 X10 (3) UL (ref 1.5–7.7)
NEUTROPHILS # BLD AUTO: 4.61 X10(3) UL (ref 1.5–7.7)
NEUTROPHILS NFR BLD AUTO: 61.8 %
OSMOLALITY SERPL CALC.SUM OF ELEC: 277 MOSM/KG (ref 275–295)
PATIENT FASTING Y/N/NP: YES
PLATELET # BLD AUTO: 185 10(3)UL (ref 150–450)
POTASSIUM SERPL-SCNC: 4.4 MMOL/L (ref 3.5–5.1)
PSA SERPL DL<=0.01 NG/ML-MCNC: 12.9 SECONDS (ref 12.4–14.6)
RBC # BLD AUTO: 4.52 X10(6)UL (ref 3.8–5.3)
SODIUM SERPL-SCNC: 134 MMOL/L (ref 136–145)
WBC # BLD AUTO: 7.5 X10(3) UL (ref 4–11)

## 2020-08-14 PROCEDURE — 80053 COMPREHEN METABOLIC PANEL: CPT

## 2020-08-14 PROCEDURE — 85610 PROTHROMBIN TIME: CPT

## 2020-08-14 PROCEDURE — 85025 COMPLETE CBC W/AUTO DIFF WBC: CPT

## 2020-08-14 PROCEDURE — 36415 COLL VENOUS BLD VENIPUNCTURE: CPT

## 2020-08-17 ENCOUNTER — APPOINTMENT (OUTPATIENT)
Dept: SPEECH THERAPY | Facility: HOSPITAL | Age: 70
End: 2020-08-17
Attending: OTOLARYNGOLOGY
Payer: MEDICARE

## 2020-08-18 ENCOUNTER — HOSPITAL ENCOUNTER (OUTPATIENT)
Dept: CT IMAGING | Age: 70
Discharge: HOME | End: 2020-08-18
Attending: INTERNAL MEDICINE
Payer: MEDICARE

## 2020-08-18 DIAGNOSIS — Z98.890 HISTORY OF RIGHT-SIDED CAROTID ENDARTERECTOMY: ICD-10-CM

## 2020-08-18 DIAGNOSIS — I77.1 SUBCLAVIAN ARTERY STENOSIS (HCC): ICD-10-CM

## 2020-08-18 DIAGNOSIS — I65.23 CAROTID STENOSIS, ASYMPTOMATIC, BILATERAL: ICD-10-CM

## 2020-08-18 DIAGNOSIS — I10 ESSENTIAL HYPERTENSION, BENIGN: ICD-10-CM

## 2020-08-18 PROCEDURE — 70498 CT ANGIOGRAPHY NECK: CPT | Performed by: INTERNAL MEDICINE

## 2020-08-19 ENCOUNTER — TELEPHONE (OUTPATIENT)
Dept: CARDIOLOGY | Age: 70
End: 2020-08-19

## 2020-08-19 ENCOUNTER — TELEPHONE (OUTPATIENT)
Dept: SPEECH THERAPY | Facility: HOSPITAL | Age: 70
End: 2020-08-19

## 2020-08-19 ENCOUNTER — OFFICE VISIT (OUTPATIENT)
Dept: SPEECH THERAPY | Facility: HOSPITAL | Age: 70
End: 2020-08-19
Attending: OTOLARYNGOLOGY
Payer: MEDICARE

## 2020-08-19 ENCOUNTER — TELEPHONE (OUTPATIENT)
Dept: PHYSICAL THERAPY | Facility: HOSPITAL | Age: 70
End: 2020-08-19

## 2020-08-19 PROCEDURE — 92507 TX SP LANG VOICE COMM INDIV: CPT

## 2020-08-19 NOTE — PROGRESS NOTES
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 Not addressed due to focus on other goals. STG 6: Patient will produce resonant voice during conversation in 4/5 turns given min verbal/visual cues to reduce laryngeal focus and increase oral resonance for speech (3 months).   Not add

## 2020-08-19 NOTE — TELEPHONE ENCOUNTER
Patient was offered televisits to complete remaining sessions online as she is having great difficulty traveling the long distance from home. Per Darylene Byes, , video visits are permitted for this patient.   Patient is awaiting appropr

## 2020-08-22 ENCOUNTER — TELEPHONE (OUTPATIENT)
Dept: FAMILY MEDICINE CLINIC | Facility: CLINIC | Age: 70
End: 2020-08-22

## 2020-08-22 NOTE — TELEPHONE ENCOUNTER
Dr Israel Campos ordered a poop test, she was told to come here to  the testing stuff. Can she come today to ?

## 2020-08-24 ENCOUNTER — APPOINTMENT (OUTPATIENT)
Dept: SPEECH THERAPY | Facility: HOSPITAL | Age: 70
End: 2020-08-24
Attending: OTOLARYNGOLOGY
Payer: MEDICARE

## 2020-08-25 ENCOUNTER — TELEPHONE (OUTPATIENT)
Dept: FAMILY MEDICINE CLINIC | Facility: CLINIC | Age: 70
End: 2020-08-25

## 2020-08-25 NOTE — TELEPHONE ENCOUNTER
SHE WANTS TO KNOW WHAT  [facility] WANTS HER TO DO ABOUT THE LAB RESULTS THAT DR José Miguel Edagr

## 2020-08-26 ENCOUNTER — APPOINTMENT (OUTPATIENT)
Dept: SPEECH THERAPY | Facility: HOSPITAL | Age: 70
End: 2020-08-26
Attending: OTOLARYNGOLOGY
Payer: MEDICARE

## 2020-08-27 ENCOUNTER — APPOINTMENT (OUTPATIENT)
Dept: SPEECH THERAPY | Facility: HOSPITAL | Age: 70
End: 2020-08-27
Attending: OTOLARYNGOLOGY
Payer: MEDICARE

## 2020-08-31 ENCOUNTER — TELEMEDICINE (OUTPATIENT)
Dept: SPEECH THERAPY | Facility: HOSPITAL | Age: 70
End: 2020-08-31
Attending: INTERNAL MEDICINE
Payer: MEDICARE

## 2020-08-31 ENCOUNTER — APPOINTMENT (OUTPATIENT)
Dept: SPEECH THERAPY | Facility: HOSPITAL | Age: 70
End: 2020-08-31
Attending: OTOLARYNGOLOGY
Payer: MEDICARE

## 2020-09-02 ENCOUNTER — APPOINTMENT (OUTPATIENT)
Dept: SPEECH THERAPY | Facility: HOSPITAL | Age: 70
End: 2020-09-02
Attending: OTOLARYNGOLOGY
Payer: MEDICARE

## 2020-09-08 ENCOUNTER — TELEPHONE (OUTPATIENT)
Dept: CARDIOLOGY | Age: 70
End: 2020-09-08

## 2020-09-09 ENCOUNTER — TELEMEDICINE (OUTPATIENT)
Dept: SPEECH THERAPY | Facility: HOSPITAL | Age: 70
End: 2020-09-09
Attending: OTOLARYNGOLOGY
Payer: MEDICARE

## 2020-09-09 PROCEDURE — 92507 TX SP LANG VOICE COMM INDIV: CPT

## 2020-09-09 NOTE — PROGRESS NOTES
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 She stated that she has observed this for approximately six months.   She was advised to discuss with MD.  She verbalized understanding and was i

## 2020-09-11 ENCOUNTER — TELEPHONE (OUTPATIENT)
Dept: CARDIOLOGY | Age: 70
End: 2020-09-11

## 2020-09-16 ENCOUNTER — TELEMEDICINE (OUTPATIENT)
Dept: SPEECH THERAPY | Facility: HOSPITAL | Age: 70
End: 2020-09-16
Attending: OTOLARYNGOLOGY
Payer: MEDICARE

## 2020-09-16 PROCEDURE — 92507 TX SP LANG VOICE COMM INDIV: CPT

## 2020-09-16 NOTE — PROGRESS NOTES
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 focus on other goals.     Assessment: Patient presents with mild-moderate dysphonia characterized by increased roughness, pitch instability, difficulty projecting, hoarse voice, hyponasality, and decreased breath support.   Patient has been informally obser

## 2020-09-22 ENCOUNTER — TELEPHONE (OUTPATIENT)
Dept: PHYSICAL THERAPY | Age: 70
End: 2020-09-22

## 2020-09-22 ENCOUNTER — APPOINTMENT (OUTPATIENT)
Dept: SPEECH THERAPY | Facility: HOSPITAL | Age: 70
End: 2020-09-22
Attending: OTOLARYNGOLOGY
Payer: MEDICARE

## 2020-09-23 ENCOUNTER — APPOINTMENT (OUTPATIENT)
Dept: SPEECH THERAPY | Facility: HOSPITAL | Age: 70
End: 2020-09-23
Attending: FAMILY MEDICINE
Payer: MEDICARE

## 2020-09-28 ENCOUNTER — OFFICE VISIT (OUTPATIENT)
Dept: CARDIOLOGY | Age: 70
End: 2020-09-28

## 2020-09-28 VITALS
HEART RATE: 52 BPM | DIASTOLIC BLOOD PRESSURE: 62 MMHG | SYSTOLIC BLOOD PRESSURE: 139 MMHG | WEIGHT: 172 LBS | HEIGHT: 62 IN | BODY MASS INDEX: 31.65 KG/M2

## 2020-09-28 DIAGNOSIS — Z98.890 HISTORY OF RIGHT-SIDED CAROTID ENDARTERECTOMY: ICD-10-CM

## 2020-09-28 DIAGNOSIS — I10 ESSENTIAL HYPERTENSION: ICD-10-CM

## 2020-09-28 DIAGNOSIS — E78.2 HYPERLIPIDEMIA, MIXED: ICD-10-CM

## 2020-09-28 DIAGNOSIS — I77.1 SUBCLAVIAN ARTERY STENOSIS (CMD): ICD-10-CM

## 2020-09-28 DIAGNOSIS — I65.23 CAROTID STENOSIS, ASYMPTOMATIC, BILATERAL: Primary | ICD-10-CM

## 2020-09-28 PROCEDURE — 99442 TELEPHONE E&M BY PHYSICIAN EST PT NOT ORIG PREV 7 DAYS 11-20 MIN: CPT | Performed by: INTERNAL MEDICINE

## 2020-09-28 RX ORDER — FUROSEMIDE 20 MG/1
20 TABLET ORAL PRN
Qty: 20 TABLET | Refills: 0 | Status: SHIPPED | OUTPATIENT
Start: 2020-09-28

## 2020-09-28 ASSESSMENT — ENCOUNTER SYMPTOMS
HEMATOCHEZIA: 0
WEIGHT GAIN: 0
FEVER: 0
HEMOPTYSIS: 0
CHILLS: 0
WEIGHT LOSS: 0
BRUISES/BLEEDS EASILY: 0
SUSPICIOUS LESIONS: 0
ALLERGIC/IMMUNOLOGIC COMMENTS: NO NEW FOOD ALLERGIES
COUGH: 0

## 2020-09-30 ENCOUNTER — APPOINTMENT (OUTPATIENT)
Dept: SPEECH THERAPY | Facility: HOSPITAL | Age: 70
End: 2020-09-30
Attending: OTOLARYNGOLOGY
Payer: MEDICARE

## 2020-10-07 ENCOUNTER — TELEMEDICINE (OUTPATIENT)
Dept: SPEECH THERAPY | Facility: HOSPITAL | Age: 70
End: 2020-10-07
Attending: OTOLARYNGOLOGY
Payer: MEDICARE

## 2020-10-07 PROCEDURE — 92507 TX SP LANG VOICE COMM INDIV: CPT

## 2020-10-07 NOTE — PROGRESS NOTES
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 oral resonance for speech (3 months). Reviewed resonant voice (RV) exercises. Provided further verbal instruction. Patient complete RV for humming and  /m/ syllables/words/sentences given direct model.     STG 5: Patient will produce resonant voice durin

## 2020-10-14 ENCOUNTER — TELEPHONE (OUTPATIENT)
Dept: FAMILY MEDICINE CLINIC | Facility: CLINIC | Age: 70
End: 2020-10-14

## 2020-10-14 ENCOUNTER — TELEMEDICINE (OUTPATIENT)
Dept: SPEECH THERAPY | Facility: HOSPITAL | Age: 70
End: 2020-10-14
Attending: OTOLARYNGOLOGY
Payer: MEDICARE

## 2020-10-14 NOTE — TELEPHONE ENCOUNTER
Alfredito Walton is calling she bought osteo biflex and she was wanting to make sure that it was ok for her to take, please advise

## 2020-10-14 NOTE — TELEPHONE ENCOUNTER
I am sure it will not hurt but it is probably not going to help either. Might be better spending her money on something more fun.

## 2020-10-21 ENCOUNTER — APPOINTMENT (OUTPATIENT)
Dept: SPEECH THERAPY | Facility: HOSPITAL | Age: 70
End: 2020-10-21
Attending: OTOLARYNGOLOGY
Payer: MEDICARE

## 2020-10-28 ENCOUNTER — HOSPITAL ENCOUNTER (OUTPATIENT)
Dept: CV DIAGNOSTICS | Age: 70
Discharge: HOME | End: 2020-10-28
Attending: INTERNAL MEDICINE
Payer: MEDICARE

## 2020-10-28 DIAGNOSIS — I65.23 ASYMPTOMATIC CAROTID ARTERY STENOSIS, BILATERAL: ICD-10-CM

## 2020-10-28 PROCEDURE — 93880 EXTRACRANIAL BILAT STUDY: CPT | Performed by: INTERNAL MEDICINE

## 2020-10-30 DIAGNOSIS — I65.23 CAROTID STENOSIS, ASYMPTOMATIC, BILATERAL: ICD-10-CM

## 2020-11-03 ENCOUNTER — TELEPHONE (OUTPATIENT)
Dept: CARDIOLOGY | Age: 70
End: 2020-11-03

## 2020-11-07 ENCOUNTER — IMMUNIZATION (OUTPATIENT)
Dept: FAMILY MEDICINE CLINIC | Facility: CLINIC | Age: 70
End: 2020-11-07
Payer: MEDICARE

## 2020-11-07 DIAGNOSIS — Z23 NEED FOR VACCINATION: ICD-10-CM

## 2020-11-07 PROCEDURE — 90662 IIV NO PRSV INCREASED AG IM: CPT | Performed by: FAMILY MEDICINE

## 2020-11-07 PROCEDURE — G0008 ADMIN INFLUENZA VIRUS VAC: HCPCS | Performed by: FAMILY MEDICINE

## 2020-11-12 ENCOUNTER — TELEPHONE (OUTPATIENT)
Dept: CARDIOLOGY | Age: 70
End: 2020-11-12

## 2020-11-12 ENCOUNTER — OFFICE VISIT (OUTPATIENT)
Dept: CARDIOLOGY | Age: 70
End: 2020-11-12

## 2020-11-12 VITALS
HEART RATE: 60 BPM | SYSTOLIC BLOOD PRESSURE: 150 MMHG | BODY MASS INDEX: 32.39 KG/M2 | HEIGHT: 62 IN | WEIGHT: 176 LBS | DIASTOLIC BLOOD PRESSURE: 64 MMHG

## 2020-11-12 DIAGNOSIS — E78.2 HYPERLIPIDEMIA, MIXED: ICD-10-CM

## 2020-11-12 DIAGNOSIS — I77.1 SUBCLAVIAN ARTERY STENOSIS (CMD): ICD-10-CM

## 2020-11-12 DIAGNOSIS — I77.1 SUBCLAVIAN ARTERY STENOSIS (CMD): Primary | ICD-10-CM

## 2020-11-12 DIAGNOSIS — I10 ESSENTIAL HYPERTENSION: ICD-10-CM

## 2020-11-12 DIAGNOSIS — Z98.890 HISTORY OF RIGHT-SIDED CAROTID ENDARTERECTOMY: Primary | ICD-10-CM

## 2020-11-12 PROCEDURE — 99215 OFFICE O/P EST HI 40 MIN: CPT | Performed by: INTERNAL MEDICINE

## 2020-11-12 ASSESSMENT — ENCOUNTER SYMPTOMS
COUGH: 0
WEIGHT LOSS: 0
ALLERGIC/IMMUNOLOGIC COMMENTS: NO NEW FOOD ALLERGIES
FEVER: 0
HEMOPTYSIS: 0
BRUISES/BLEEDS EASILY: 0
SUSPICIOUS LESIONS: 0
WEIGHT GAIN: 0
CHILLS: 0
HEMATOCHEZIA: 0

## 2020-11-13 RX ORDER — CLOPIDOGREL BISULFATE 75 MG/1
TABLET ORAL
Qty: 90 TABLET | Refills: 3 | Status: SHIPPED | OUTPATIENT
Start: 2020-11-13

## 2020-11-17 ENCOUNTER — TELEPHONE (OUTPATIENT)
Dept: FAMILY MEDICINE CLINIC | Facility: CLINIC | Age: 70
End: 2020-11-17

## 2020-11-18 ENCOUNTER — TELEPHONE (OUTPATIENT)
Dept: CARDIOLOGY | Age: 70
End: 2020-11-18

## 2020-11-18 RX ORDER — PANTOPRAZOLE SODIUM 40 MG/1
40 TABLET, DELAYED RELEASE ORAL DAILY
COMMUNITY
End: 2020-11-18 | Stop reason: SDUPTHER

## 2020-11-18 RX ORDER — PANTOPRAZOLE SODIUM 40 MG/1
40 TABLET, DELAYED RELEASE ORAL DAILY
Qty: 90 TABLET | Refills: 0 | Status: SHIPPED | OUTPATIENT
Start: 2020-11-18

## 2020-12-02 ENCOUNTER — TELEPHONE (OUTPATIENT)
Dept: FAMILY MEDICINE CLINIC | Facility: CLINIC | Age: 70
End: 2020-12-02

## 2020-12-02 NOTE — TELEPHONE ENCOUNTER
Orders received. Pt advised.     Future Appointments   Date Time Provider [facility]   12/4/2020 11:00 AM REF EM[facility] SW [facility] PRAC REF [facility] Ref Lab [facility]

## 2020-12-02 NOTE — TELEPHONE ENCOUNTER
DID YOU GET THE ORDER FOR LABS FROM DR Janet Casey?    ANALIA SAID SHE NEEDS TO COME IN Friday FOR THEM AND THAT DR Leigh Shayna OFFICE FAXED THEM A SHORT WHILE AGO

## 2020-12-04 ENCOUNTER — LABORATORY ENCOUNTER (OUTPATIENT)
Dept: LAB | Age: 70
End: 2020-12-04
Attending: FAMILY MEDICINE
Payer: MEDICARE

## 2020-12-04 DIAGNOSIS — E78.5 HYPERLIPIDEMIA, UNSPECIFIED HYPERLIPIDEMIA TYPE: ICD-10-CM

## 2020-12-04 DIAGNOSIS — R60.1 GENERALIZED EDEMA: ICD-10-CM

## 2020-12-04 DIAGNOSIS — I10 ESSENTIAL HYPERTENSION: Primary | ICD-10-CM

## 2020-12-04 PROCEDURE — 80048 BASIC METABOLIC PNL TOTAL CA: CPT

## 2020-12-04 PROCEDURE — 36415 COLL VENOUS BLD VENIPUNCTURE: CPT

## 2020-12-05 ENCOUNTER — APPOINTMENT (OUTPATIENT)
Dept: LAB | Age: 70
End: 2020-12-05
Attending: INTERNAL MEDICINE
Payer: MEDICARE

## 2020-12-05 DIAGNOSIS — I77.1 SUBCLAVIAN ARTERY STENOSIS (HCC): ICD-10-CM

## 2020-12-05 LAB
SARS-COV-2 RNA SPEC QL NAA+PROBE: NOT DETECTED
SPECIMEN SOURCE: NORMAL

## 2020-12-07 ENCOUNTER — TELEPHONE (OUTPATIENT)
Dept: CARDIOLOGY | Age: 70
End: 2020-12-07

## 2020-12-07 RX ORDER — CLOPIDOGREL BISULFATE 75 MG/1
75 TABLET ORAL DAILY
COMMUNITY
Start: 2020-12-08

## 2020-12-07 RX ORDER — PANTOPRAZOLE SODIUM 40 MG/1
40 TABLET, DELAYED RELEASE ORAL
COMMUNITY
End: 2021-01-18

## 2020-12-07 NOTE — H&P
Progress Notes  - documented in this encounter  Pito Purvis MD - 11/12/2020 10:15 AM CST  Formatting of this note might be different from the original.    [address] [facility]    PCP: Armando Oates DO    Chief Complaint   Patient presents Comments)   • Levaquin Other (See Comments)   • Levofloxacin Other (See Comments)   Facial edema    • Lexapro Other (See Comments)   • Losartan Other (See Comments)   Watery eyes, blister under eye     Presenting Medications  Current Medications   AMLODIPI allergies. No new food allergies     Review of Systems  12 point review of systems reviewed and negative or clinically irrelevant except where noted in HPI.      Physical Exam:  Vitals:   Visit Vitals  BP (!) 150/64   Pulse 60   Ht 5' 2\" (1.575 m)   Wt 7

## 2020-12-08 ENCOUNTER — HOSPITAL ENCOUNTER (OUTPATIENT)
Dept: INTERVENTIONAL RADIOLOGY/VASCULAR | Facility: HOSPITAL | Age: 70
Discharge: HOME | End: 2020-12-08
Attending: INTERNAL MEDICINE | Admitting: INTERNAL MEDICINE
Payer: MEDICARE

## 2020-12-08 VITALS
OXYGEN SATURATION: 95 % | HEART RATE: 55 BPM | SYSTOLIC BLOOD PRESSURE: 148 MMHG | RESPIRATION RATE: 18 BRPM | TEMPERATURE: 97 F | DIASTOLIC BLOOD PRESSURE: 118 MMHG

## 2020-12-08 DIAGNOSIS — I77.1 SUBCLAVIAN ARTERY STENOSIS (HCC): Primary | ICD-10-CM

## 2020-12-08 PROCEDURE — 75710 ARTERY X-RAYS ARM/LEG: CPT

## 2020-12-08 PROCEDURE — B310YZZ FLUOROSCOPY OF THORACIC AORTA USING OTHER CONTRAST: ICD-10-PCS | Performed by: INTERNAL MEDICINE

## 2020-12-08 PROCEDURE — 75600 CONTRAST EXAM THORACIC AORTA: CPT | Performed by: INTERNAL MEDICINE

## 2020-12-08 PROCEDURE — 93454 CORONARY ARTERY ANGIO S&I: CPT

## 2020-12-08 PROCEDURE — 93454 CORONARY ARTERY ANGIO S&I: CPT | Performed by: INTERNAL MEDICINE

## 2020-12-08 PROCEDURE — 93010 ELECTROCARDIOGRAM REPORT: CPT | Performed by: INTERNAL MEDICINE

## 2020-12-08 PROCEDURE — 36415 COLL VENOUS BLD VENIPUNCTURE: CPT

## 2020-12-08 PROCEDURE — 75710 ARTERY X-RAYS ARM/LEG: CPT | Performed by: INTERNAL MEDICINE

## 2020-12-08 PROCEDURE — 93567 NJX CAR CTH SPRVLV AORTGRPHY: CPT

## 2020-12-08 PROCEDURE — B211YZZ FLUOROSCOPY OF MULTIPLE CORONARY ARTERIES USING OTHER CONTRAST: ICD-10-PCS | Performed by: INTERNAL MEDICINE

## 2020-12-08 PROCEDURE — 85027 COMPLETE CBC AUTOMATED: CPT | Performed by: INTERNAL MEDICINE

## 2020-12-08 PROCEDURE — 93005 ELECTROCARDIOGRAM TRACING: CPT

## 2020-12-08 RX ORDER — MIDAZOLAM HYDROCHLORIDE 1 MG/ML
INJECTION INTRAMUSCULAR; INTRAVENOUS
Status: DISCONTINUED
Start: 2020-12-08 | End: 2020-12-08 | Stop reason: WASHOUT

## 2020-12-08 RX ORDER — LIDOCAINE HYDROCHLORIDE 10 MG/ML
INJECTION, SOLUTION EPIDURAL; INFILTRATION; INTRACAUDAL; PERINEURAL
Status: COMPLETED
Start: 2020-12-08 | End: 2020-12-08

## 2020-12-08 RX ORDER — SODIUM CHLORIDE 9 MG/ML
INJECTION, SOLUTION INTRAVENOUS
Status: COMPLETED | OUTPATIENT
Start: 2020-12-09 | End: 2020-12-08

## 2020-12-08 RX ORDER — HEPARIN SODIUM 5000 [USP'U]/ML
INJECTION, SOLUTION INTRAVENOUS; SUBCUTANEOUS
Status: COMPLETED
Start: 2020-12-08 | End: 2020-12-08

## 2020-12-08 RX ORDER — MIDAZOLAM HYDROCHLORIDE 1 MG/ML
INJECTION INTRAMUSCULAR; INTRAVENOUS
Status: COMPLETED
Start: 2020-12-08 | End: 2020-12-08

## 2020-12-08 RX ADMIN — SODIUM CHLORIDE: 9 INJECTION, SOLUTION INTRAVENOUS at 11:02:00

## 2020-12-08 NOTE — PROCEDURES
[address]    PATIENT'S NAME: Jodi Silva   ATTENDING PHYSICIAN: Mason Solorio M.D. OPERATING PHYSICIAN: Mason Solorio M.D.    PATIENT ACCOUNT#:   [de-identified]    LOCATION:  [address]  MEDICAL RECORD #:   [id number]       DATE [facility]

## 2020-12-08 NOTE — PLAN OF CARE
Pt post LHC/subcalvian angio via right groin. Manual pressure held to site while pt still in cath lab. On arrival back to unit, right groin c/d/i & soft. All peripheral pulses intact & palp. VSS.  After required bedrest & voiding, pt ambulated in hallway w/

## 2020-12-11 ENCOUNTER — E-ADVICE (OUTPATIENT)
Dept: CARDIOLOGY | Age: 70
End: 2020-12-11

## 2020-12-14 ENCOUNTER — TELEPHONE (OUTPATIENT)
Dept: CARDIOLOGY | Age: 70
End: 2020-12-14

## 2020-12-17 RX ORDER — SERTRALINE HYDROCHLORIDE 25 MG/1
25 TABLET, FILM COATED ORAL DAILY
COMMUNITY

## 2020-12-18 ENCOUNTER — OFFICE VISIT (OUTPATIENT)
Dept: CARDIOLOGY | Age: 70
End: 2020-12-18

## 2020-12-18 ENCOUNTER — TELEPHONE (OUTPATIENT)
Dept: CARDIOLOGY | Age: 70
End: 2020-12-18

## 2020-12-18 VITALS
BODY MASS INDEX: 31.18 KG/M2 | HEIGHT: 63 IN | WEIGHT: 176 LBS | HEART RATE: 64 BPM | SYSTOLIC BLOOD PRESSURE: 130 MMHG | DIASTOLIC BLOOD PRESSURE: 72 MMHG

## 2020-12-18 DIAGNOSIS — I65.23 CAROTID STENOSIS, ASYMPTOMATIC, BILATERAL: ICD-10-CM

## 2020-12-18 DIAGNOSIS — I77.1 SUBCLAVIAN ARTERY STENOSIS (CMD): Primary | ICD-10-CM

## 2020-12-18 DIAGNOSIS — Z98.890 HISTORY OF RIGHT-SIDED CAROTID ENDARTERECTOMY: ICD-10-CM

## 2020-12-18 DIAGNOSIS — I10 ESSENTIAL HYPERTENSION: ICD-10-CM

## 2020-12-18 DIAGNOSIS — E78.2 HYPERLIPIDEMIA, MIXED: ICD-10-CM

## 2020-12-18 PROCEDURE — 99215 OFFICE O/P EST HI 40 MIN: CPT | Performed by: INTERNAL MEDICINE

## 2020-12-18 ASSESSMENT — ENCOUNTER SYMPTOMS
BRUISES/BLEEDS EASILY: 0
NUMBNESS: 1
CHILLS: 0
HEMATOCHEZIA: 0
ALLERGIC/IMMUNOLOGIC COMMENTS: NO NEW FOOD ALLERGIES
FEVER: 0
WEIGHT LOSS: 0
LOSS OF BALANCE: 1
WEIGHT GAIN: 0
HEMOPTYSIS: 0
SUSPICIOUS LESIONS: 0
COUGH: 0

## 2020-12-18 ASSESSMENT — PATIENT HEALTH QUESTIONNAIRE - PHQ9
CLINICAL INTERPRETATION OF PHQ2 SCORE: NO FURTHER SCREENING NEEDED
SUM OF ALL RESPONSES TO PHQ9 QUESTIONS 1 AND 2: 0
SUM OF ALL RESPONSES TO PHQ9 QUESTIONS 1 AND 2: 0
1. LITTLE INTEREST OR PLEASURE IN DOING THINGS: NOT AT ALL
2. FEELING DOWN, DEPRESSED OR HOPELESS: NOT AT ALL
CLINICAL INTERPRETATION OF PHQ9 SCORE: NO FURTHER SCREENING NEEDED

## 2020-12-23 ENCOUNTER — TELEPHONE (OUTPATIENT)
Dept: FAMILY MEDICINE CLINIC | Facility: CLINIC | Age: 70
End: 2020-12-23

## 2020-12-23 RX ORDER — FLUTICASONE PROPIONATE AND SALMETEROL 250; 50 UG/1; UG/1
1 POWDER RESPIRATORY (INHALATION) 2 TIMES DAILY
Qty: 1 EACH | Refills: 1 | Status: SHIPPED | OUTPATIENT
Start: 2020-12-23

## 2020-12-23 NOTE — TELEPHONE ENCOUNTER
Shawnee Vergara states that she has a high deductible and thinks the advair will be just as expensive. She will be contacting Dr. Yanelis Muñoz office to see if they have samples available.      Advised pt we will send script to [facility], she will call pharmacy to

## 2020-12-23 NOTE — TELEPHONE ENCOUNTER
She went to get her Trelegy and it was over 400 bucks. She is asking if we have any samples. Or she will take simbacort or brio if any samples? ?

## 2020-12-28 ENCOUNTER — TELEPHONE (OUTPATIENT)
Dept: CARDIOLOGY | Age: 70
End: 2020-12-28

## 2020-12-29 ENCOUNTER — TELEPHONE (OUTPATIENT)
Dept: CARDIOLOGY | Age: 70
End: 2020-12-29

## 2020-12-29 DIAGNOSIS — I77.1 SUBCLAVIAN ARTERY STENOSIS (CMD): Primary | ICD-10-CM

## 2021-01-07 NOTE — H&P
Progress Notes  - documented in this encounter  Iam Maria MD - 12/18/2020 12:30 PM CST  Formatting of this note might be different from the original.    1993 [facility]    PCP: Jarod Rasmussen, DO    Chief Complaint   Patient presents Comments)   Facial edema    • Lexapro Other (See Comments)   • Losartan Other (See Comments)   Watery eyes, blister under eye   • Mastisol Adhesive RASH   If patient has bandaid on too long will break out in rash, Patient states she is able to wear latex g Negative for hematochezia and melena. Genitourinary: Negative for hematuria. Neurological: Positive for loss of balance and numbness (left arm/hand). No localized deficits   Allergic/Immunologic: Negative for environmental allergies.    No new food al

## 2021-01-09 ENCOUNTER — LAB ENCOUNTER (OUTPATIENT)
Dept: LAB | Age: 71
End: 2021-01-09
Attending: INTERNAL MEDICINE
Payer: MEDICARE

## 2021-01-09 ENCOUNTER — HOSPITAL ENCOUNTER (OUTPATIENT)
Dept: GENERAL RADIOLOGY | Age: 71
Discharge: HOME | End: 2021-01-09
Attending: INTERNAL MEDICINE
Payer: MEDICARE

## 2021-01-09 DIAGNOSIS — I77.1 SUBCLAVIAN ARTERIAL STENOSIS (HCC): ICD-10-CM

## 2021-01-09 PROCEDURE — 71046 X-RAY EXAM CHEST 2 VIEWS: CPT | Performed by: INTERNAL MEDICINE

## 2021-01-10 LAB — SARS-COV-2 RNA RESP QL NAA+PROBE: NOT DETECTED

## 2021-01-12 ENCOUNTER — HOSPITAL ENCOUNTER (OUTPATIENT)
Dept: INTERVENTIONAL RADIOLOGY/VASCULAR | Facility: HOSPITAL | Age: 71
Setting detail: OBSERVATION
Discharge: HOME | End: 2021-01-13
Attending: INTERNAL MEDICINE | Admitting: INTERNAL MEDICINE
Payer: MEDICARE

## 2021-01-12 DIAGNOSIS — I77.1 SUBCLAVIAN ARTERIAL STENOSIS (HCC): Primary | ICD-10-CM

## 2021-01-12 LAB
ANION GAP SERPL CALC-SCNC: 4 MMOL/L (ref 0–18)
ATRIAL RATE: 63 BPM
BASOPHILS # BLD AUTO: 0.04 X10(3) UL (ref 0–0.2)
BASOPHILS # BLD AUTO: 0.05 X10(3) UL (ref 0–0.2)
BASOPHILS NFR BLD AUTO: 0.5 %
BASOPHILS NFR BLD AUTO: 0.8 %
BUN BLD-MCNC: 13 MG/DL (ref 7–18)
BUN/CREAT SERPL: 24.1 (ref 10–20)
CALCIUM BLD-MCNC: 8.9 MG/DL (ref 8.5–10.1)
CHLORIDE SERPL-SCNC: 101 MMOL/L (ref 98–112)
CO2 SERPL-SCNC: 28 MMOL/L (ref 21–32)
CREAT BLD-MCNC: 0.54 MG/DL
DEPRECATED RDW RBC AUTO: 39.6 FL (ref 35.1–46.3)
DEPRECATED RDW RBC AUTO: 40.2 FL (ref 35.1–46.3)
EOSINOPHIL # BLD AUTO: 0.23 X10(3) UL (ref 0–0.7)
EOSINOPHIL # BLD AUTO: 0.23 X10(3) UL (ref 0–0.7)
EOSINOPHIL NFR BLD AUTO: 2.8 %
EOSINOPHIL NFR BLD AUTO: 3.5 %
ERYTHROCYTE [DISTWIDTH] IN BLOOD BY AUTOMATED COUNT: 12.1 % (ref 11–15)
ERYTHROCYTE [DISTWIDTH] IN BLOOD BY AUTOMATED COUNT: 12.3 % (ref 11–15)
GLUCOSE BLD-MCNC: 97 MG/DL (ref 70–99)
HCT VFR BLD AUTO: 33.6 %
HCT VFR BLD AUTO: 38 %
HGB BLD-MCNC: 11.3 G/DL
HGB BLD-MCNC: 13.1 G/DL
IMM GRANULOCYTES # BLD AUTO: 0.01 X10(3) UL (ref 0–1)
IMM GRANULOCYTES # BLD AUTO: 0.02 X10(3) UL (ref 0–1)
IMM GRANULOCYTES NFR BLD: 0.2 %
IMM GRANULOCYTES NFR BLD: 0.2 %
ISTAT ACTIVATED CLOTTING TIME: 175 SECONDS (ref 74–137)
ISTAT ACTIVATED CLOTTING TIME: 191 SECONDS (ref 74–137)
ISTAT ACTIVATED CLOTTING TIME: 235 SECONDS (ref 74–137)
LYMPHOCYTES # BLD AUTO: 1.39 X10(3) UL (ref 1–4)
LYMPHOCYTES # BLD AUTO: 2.76 X10(3) UL (ref 1–4)
LYMPHOCYTES NFR BLD AUTO: 21 %
LYMPHOCYTES NFR BLD AUTO: 34.2 %
MCH RBC QN AUTO: 30.1 PG (ref 26–34)
MCH RBC QN AUTO: 30.3 PG (ref 26–34)
MCHC RBC AUTO-ENTMCNC: 33.6 G/DL (ref 31–37)
MCHC RBC AUTO-ENTMCNC: 34.5 G/DL (ref 31–37)
MCV RBC AUTO: 87.4 FL
MCV RBC AUTO: 90.1 FL
MONOCYTES # BLD AUTO: 0.61 X10(3) UL (ref 0.1–1)
MONOCYTES # BLD AUTO: 0.63 X10(3) UL (ref 0.1–1)
MONOCYTES NFR BLD AUTO: 7.5 %
MONOCYTES NFR BLD AUTO: 9.5 %
NEUTROPHILS # BLD AUTO: 4.3 X10 (3) UL (ref 1.5–7.7)
NEUTROPHILS # BLD AUTO: 4.3 X10(3) UL (ref 1.5–7.7)
NEUTROPHILS # BLD AUTO: 4.42 X10 (3) UL (ref 1.5–7.7)
NEUTROPHILS # BLD AUTO: 4.42 X10(3) UL (ref 1.5–7.7)
NEUTROPHILS NFR BLD AUTO: 54.8 %
NEUTROPHILS NFR BLD AUTO: 65 %
OSMOLALITY SERPL CALC.SUM OF ELEC: 276 MOSM/KG (ref 275–295)
P AXIS: 68 DEGREES
P-R INTERVAL: 172 MS
PLATELET # BLD AUTO: 186 10(3)UL (ref 150–450)
PLATELET # BLD AUTO: 212 10(3)UL (ref 150–450)
POTASSIUM SERPL-SCNC: 4 MMOL/L (ref 3.5–5.1)
Q-T INTERVAL: 454 MS
QRS DURATION: 86 MS
QTC CALCULATION (BEZET): 464 MS
R AXIS: 19 DEGREES
RBC # BLD AUTO: 3.73 X10(6)UL
RBC # BLD AUTO: 4.35 X10(6)UL
SODIUM SERPL-SCNC: 133 MMOL/L (ref 136–145)
T AXIS: 22 DEGREES
VENTRICULAR RATE: 63 BPM
WBC # BLD AUTO: 6.6 X10(3) UL (ref 4–11)
WBC # BLD AUTO: 8.1 X10(3) UL (ref 4–11)

## 2021-01-12 PROCEDURE — 037Y3DZ DILATION OF UPPER ARTERY WITH INTRALUMINAL DEVICE, PERCUTANEOUS APPROACH: ICD-10-PCS | Performed by: INTERNAL MEDICINE

## 2021-01-12 PROCEDURE — 75710 ARTERY X-RAYS ARM/LEG: CPT

## 2021-01-12 PROCEDURE — 37236 OPEN/PERQ PLACE STENT 1ST: CPT | Performed by: INTERNAL MEDICINE

## 2021-01-12 PROCEDURE — 85025 COMPLETE CBC W/AUTO DIFF WBC: CPT | Performed by: INTERNAL MEDICINE

## 2021-01-12 PROCEDURE — 93010 ELECTROCARDIOGRAM REPORT: CPT | Performed by: INTERNAL MEDICINE

## 2021-01-12 PROCEDURE — 85347 COAGULATION TIME ACTIVATED: CPT

## 2021-01-12 PROCEDURE — 03F43ZZ FRAGMENTATION OF LEFT SUBCLAVIAN ARTERY, PERCUTANEOUS APPROACH: ICD-10-PCS | Performed by: INTERNAL MEDICINE

## 2021-01-12 PROCEDURE — 93005 ELECTROCARDIOGRAM TRACING: CPT

## 2021-01-12 PROCEDURE — 36415 COLL VENOUS BLD VENIPUNCTURE: CPT

## 2021-01-12 PROCEDURE — 80048 BASIC METABOLIC PNL TOTAL CA: CPT | Performed by: INTERNAL MEDICINE

## 2021-01-12 RX ORDER — ACETAMINOPHEN 325 MG/1
650 TABLET ORAL EVERY 6 HOURS PRN
Status: DISCONTINUED | OUTPATIENT
Start: 2021-01-12 | End: 2021-01-13

## 2021-01-12 RX ORDER — LIDOCAINE HYDROCHLORIDE 10 MG/ML
INJECTION, SOLUTION EPIDURAL; INFILTRATION; INTRACAUDAL; PERINEURAL
Status: COMPLETED
Start: 2021-01-12 | End: 2021-01-12

## 2021-01-12 RX ORDER — HEPARIN SODIUM 5000 [USP'U]/ML
INJECTION, SOLUTION INTRAVENOUS; SUBCUTANEOUS
Status: COMPLETED
Start: 2021-01-12 | End: 2021-01-12

## 2021-01-12 RX ORDER — CEFAZOLIN SODIUM/WATER 2 G/20 ML
SYRINGE (ML) INTRAVENOUS
Status: COMPLETED
Start: 2021-01-12 | End: 2021-01-12

## 2021-01-12 RX ORDER — MIDAZOLAM HYDROCHLORIDE 1 MG/ML
INJECTION INTRAMUSCULAR; INTRAVENOUS
Status: COMPLETED
Start: 2021-01-12 | End: 2021-01-12

## 2021-01-12 RX ORDER — CLONAZEPAM 0.5 MG/1
0.5 TABLET ORAL NIGHTLY PRN
Status: DISCONTINUED | OUTPATIENT
Start: 2021-01-12 | End: 2021-01-13

## 2021-01-12 RX ORDER — CLOPIDOGREL BISULFATE 75 MG/1
300 TABLET ORAL ONCE
Status: COMPLETED | OUTPATIENT
Start: 2021-01-12 | End: 2021-01-12

## 2021-01-12 RX ORDER — CLOPIDOGREL BISULFATE 75 MG/1
75 TABLET ORAL DAILY
Status: DISCONTINUED | OUTPATIENT
Start: 2021-01-13 | End: 2021-01-13

## 2021-01-12 RX ORDER — SERTRALINE HYDROCHLORIDE 25 MG/1
25 TABLET, FILM COATED ORAL DAILY
Status: DISCONTINUED | OUTPATIENT
Start: 2021-01-13 | End: 2021-01-13

## 2021-01-12 RX ORDER — SODIUM CHLORIDE 9 MG/ML
INJECTION, SOLUTION INTRAVENOUS CONTINUOUS
Status: DISCONTINUED | OUTPATIENT
Start: 2021-01-12 | End: 2021-01-13

## 2021-01-12 RX ORDER — THIOTHIXENE 1 MG/1
1 CAPSULE ORAL 2 TIMES DAILY
Status: DISCONTINUED | OUTPATIENT
Start: 2021-01-12 | End: 2021-01-13

## 2021-01-12 RX ORDER — ASPIRIN 81 MG/1
81 TABLET, CHEWABLE ORAL DAILY
Status: DISCONTINUED | OUTPATIENT
Start: 2021-01-13 | End: 2021-01-13

## 2021-01-12 RX ORDER — AMLODIPINE BESYLATE 5 MG/1
10 TABLET ORAL DAILY
Status: DISCONTINUED | OUTPATIENT
Start: 2021-01-13 | End: 2021-01-13

## 2021-01-12 RX ORDER — ATORVASTATIN CALCIUM 10 MG/1
10 TABLET, FILM COATED ORAL NIGHTLY
Refills: 1 | Status: DISCONTINUED | OUTPATIENT
Start: 2021-01-12 | End: 2021-01-13

## 2021-01-12 RX ORDER — PANTOPRAZOLE SODIUM 40 MG/1
40 TABLET, DELAYED RELEASE ORAL
Status: DISCONTINUED | OUTPATIENT
Start: 2021-01-13 | End: 2021-01-13

## 2021-01-12 RX ORDER — SODIUM CHLORIDE 9 MG/ML
INJECTION, SOLUTION INTRAVENOUS
Status: COMPLETED | OUTPATIENT
Start: 2021-01-13 | End: 2021-01-12

## 2021-01-12 RX ORDER — CLOPIDOGREL BISULFATE 75 MG/1
TABLET ORAL
Status: COMPLETED
Start: 2021-01-12 | End: 2021-01-12

## 2021-01-12 RX ADMIN — THIOTHIXENE 1 MG: 1 CAPSULE ORAL at 20:21:00

## 2021-01-12 RX ADMIN — SODIUM CHLORIDE: 9 INJECTION, SOLUTION INTRAVENOUS at 07:45:00

## 2021-01-12 RX ADMIN — ACETAMINOPHEN 650 MG: 325 TABLET ORAL at 19:21:00

## 2021-01-12 RX ADMIN — SODIUM CHLORIDE: 9 INJECTION, SOLUTION INTRAVENOUS at 11:30:00

## 2021-01-12 RX ADMIN — CLOPIDOGREL BISULFATE 300 MG: 75 TABLET ORAL at 11:30:00

## 2021-01-12 RX ADMIN — ATORVASTATIN CALCIUM 10 MG: 10 TABLET, FILM COATED ORAL at 20:21:00

## 2021-01-12 NOTE — PROCEDURES
[facility]    PATIENT'S NAME: Manpreet Bradshaw   ATTENDING PHYSICIAN: Fercho Gomez M.D. OPERATING PHYSICIAN: Fercho Gomez M.D.    PATIENT ACCOUNT#:   [de-identified]    LOCATION:  [place] [address] [address]  MEDICAL RECORD #:   [id number]       DATE Kaden Ngo

## 2021-01-12 NOTE — PROGRESS NOTES
Pt post left subclavian stent via right groin. Arterial sheath remained in place on arrival back to unit. Groin c/d/i & soft. All peripheral pulses intact & palp. VSS. Pt groggy but arousable. Plavix 300mg PO given per Dr Anabela Ronquillo.  Performed ACT until <180

## 2021-01-12 NOTE — PROGRESS NOTES
NURSING ADMISSION NOTE      Patient admitted via Bed  Oriented to room. Safety precautions initiated. Bed in low position. Call light in reach. Admit from cath lab. Called for admission orders, awaiting response. Right groin dressing c/d/i. VSS.

## 2021-01-12 NOTE — PROGRESS NOTES
Cardiology Update:    A rapid response was called to the cath lab holding room as the patient was noted to have bradycardia, hypotension and decreased responsiveness.  Myself and Dr. Sofia Berg responded, on my arrival the patient was noted to have a HR in 5

## 2021-01-13 VITALS
HEART RATE: 59 BPM | OXYGEN SATURATION: 96 % | DIASTOLIC BLOOD PRESSURE: 49 MMHG | RESPIRATION RATE: 18 BRPM | BODY MASS INDEX: 31.28 KG/M2 | HEIGHT: 62 IN | TEMPERATURE: 98 F | WEIGHT: 170 LBS | SYSTOLIC BLOOD PRESSURE: 114 MMHG

## 2021-01-13 LAB
ANION GAP SERPL CALC-SCNC: 3 MMOL/L (ref 0–18)
BUN BLD-MCNC: 9 MG/DL (ref 7–18)
BUN/CREAT SERPL: 17.3 (ref 10–20)
CALCIUM BLD-MCNC: 9.3 MG/DL (ref 8.5–10.1)
CHLORIDE SERPL-SCNC: 101 MMOL/L (ref 98–112)
CO2 SERPL-SCNC: 31 MMOL/L (ref 21–32)
CREAT BLD-MCNC: 0.52 MG/DL
DEPRECATED RDW RBC AUTO: 41.1 FL (ref 35.1–46.3)
ERYTHROCYTE [DISTWIDTH] IN BLOOD BY AUTOMATED COUNT: 12.3 % (ref 11–15)
GLUCOSE BLD-MCNC: 104 MG/DL (ref 70–99)
HCT VFR BLD AUTO: 36.6 %
HGB BLD-MCNC: 11.9 G/DL
MCH RBC QN AUTO: 29.9 PG (ref 26–34)
MCHC RBC AUTO-ENTMCNC: 32.5 G/DL (ref 31–37)
MCV RBC AUTO: 92 FL
OSMOLALITY SERPL CALC.SUM OF ELEC: 279 MOSM/KG (ref 275–295)
PLATELET # BLD AUTO: 151 10(3)UL (ref 150–450)
POTASSIUM SERPL-SCNC: 4.1 MMOL/L (ref 3.5–5.1)
RBC # BLD AUTO: 3.98 X10(6)UL
SODIUM SERPL-SCNC: 135 MMOL/L (ref 136–145)
WBC # BLD AUTO: 7.3 X10(3) UL (ref 4–11)

## 2021-01-13 PROCEDURE — 85027 COMPLETE CBC AUTOMATED: CPT | Performed by: NURSE PRACTITIONER

## 2021-01-13 PROCEDURE — 80048 BASIC METABOLIC PNL TOTAL CA: CPT | Performed by: NURSE PRACTITIONER

## 2021-01-13 PROCEDURE — 99217 OBSERVATION CARE DISCHARGE: CPT | Performed by: NURSE PRACTITIONER

## 2021-01-13 RX ORDER — METOPROLOL SUCCINATE 25 MG/1
12.5 TABLET, EXTENDED RELEASE ORAL
Qty: 30 TABLET | Refills: 1 | Status: SHIPPED | OUTPATIENT
Start: 2021-01-13 | End: 2021-01-18

## 2021-01-13 RX ADMIN — CLOPIDOGREL BISULFATE 75 MG: 75 TABLET ORAL at 08:03:00

## 2021-01-13 RX ADMIN — THIOTHIXENE 1 MG: 1 CAPSULE ORAL at 08:04:00

## 2021-01-13 RX ADMIN — ASPIRIN 81 MG: 81 TABLET, CHEWABLE ORAL at 08:02:00

## 2021-01-13 RX ADMIN — PANTOPRAZOLE SODIUM 40 MG: 40 TABLET, DELAYED RELEASE ORAL at 05:44:00

## 2021-01-13 RX ADMIN — AMLODIPINE BESYLATE 10 MG: 5 TABLET ORAL at 08:03:00

## 2021-01-13 RX ADMIN — ACETAMINOPHEN 650 MG: 325 TABLET ORAL at 08:04:00

## 2021-01-13 RX ADMIN — SERTRALINE HYDROCHLORIDE 25 MG: 25 TABLET, FILM COATED ORAL at 08:03:00

## 2021-01-13 NOTE — PLAN OF CARE
Alert and oriented x4 on tele monitor hr 50's sinus gail. Right groin dressing c/d/I. No bleeding and no hematoma. Pedal pulses present and palpable. Denies any pain. Updated w/ poc and verbalized understanding.  All needs attended and will continue to mon

## 2021-01-13 NOTE — PLAN OF CARE
Sinus bradycardia on telemetry with rates in the low 50's while awake, dropping to the 40's with sleep overnight. VSS. No c/o cardiac symptoms. Right groin site site with dressing c/d/I. On room air. SPO2 remaining >90%.  maintained.   No c/o shortne Monitor electrolytes and administer replacement therapy as ordered  Outcome: Progressing

## 2021-01-13 NOTE — PROGRESS NOTES
[facility]  Cardiology Progress Note    Subjective:  No chest pain or shortness of breath.     Objective:  /45 (BP Location: Right arm)   Pulse 56   Temp 98 °F (36.7 °C) (Oral)   Resp 18   Ht 5' 2\" (1.575 m)   Wt 170 lb (77.1 kg)   SpO2 96%   BM

## 2021-01-14 ENCOUNTER — PATIENT OUTREACH (OUTPATIENT)
Dept: CASE MANAGEMENT | Age: 71
End: 2021-01-14

## 2021-01-14 ENCOUNTER — TELEPHONE (OUTPATIENT)
Dept: CARDIOLOGY | Age: 71
End: 2021-01-14

## 2021-01-14 DIAGNOSIS — J20.9 COPD (CHRONIC OBSTRUCTIVE PULMONARY DISEASE) WITH ACUTE BRONCHITIS (HCC): ICD-10-CM

## 2021-01-14 DIAGNOSIS — J44.0 COPD (CHRONIC OBSTRUCTIVE PULMONARY DISEASE) WITH ACUTE BRONCHITIS (HCC): ICD-10-CM

## 2021-01-14 DIAGNOSIS — I65.23 CAROTID STENOSIS, ASYMPTOMATIC, BILATERAL: Primary | ICD-10-CM

## 2021-01-14 DIAGNOSIS — Z02.9 ENCOUNTERS FOR UNSPECIFIED ADMINISTRATIVE PURPOSE: ICD-10-CM

## 2021-01-14 DIAGNOSIS — I10 ESSENTIAL HYPERTENSION: ICD-10-CM

## 2021-01-14 PROCEDURE — 1111F DSCHRG MED/CURRENT MED MERGE: CPT

## 2021-01-15 NOTE — PROGRESS NOTES
Initial Post Discharge Follow Up   Discharge Date: 1/13/21  Contact Date: 1/15/2021    Consent Verification:  Assessment Completed With: Patient  HIPAA Verified?   Yes    Discharge Dx:     Carotid artery stenosiss/p left subclavian shockwave and stent pl pain manageable at home?  yes  • How well was your pain managed while in the hospital?   o On a scale of 1-5   1- Very Poor and 5- Very well   o Very well  • When you were leaving the hospital were your discharge instructions reviewed with you? yes  • How w daily.     • Clobetasol Propionate (TEMOVATE) 0.05 % Apply Externally Cream Apply topically as needed. • Vitamin D3 (VITAMIN D3) 2000 UNITS Oral Cap Take by mouth daily.        • thioTHIXene (NAVANE) 1 MG Oral Cap Take 1 capsule by mouth 3 (three) andrew environment? Primary or secondary tobacco smoke   no; The patient reports the pt's spouse does smoke, but the spouse is always away from the patient in the garage.    Occupational dusts and chemicals organic and inorganic    no  Indoor air pollution from h Patient symptoms were assessed, education was completed for signs/symptoms to monitor, and reviewed when to contact providers vs go to the ED/call 911. Appointments were reviewed and stressed the importance of a close follow up with providers.  A TCM-HFU a

## 2021-01-18 ENCOUNTER — OFFICE VISIT (OUTPATIENT)
Dept: FAMILY MEDICINE CLINIC | Facility: CLINIC | Age: 71
End: 2021-01-18
Payer: MEDICARE

## 2021-01-18 VITALS
RESPIRATION RATE: 16 BRPM | HEART RATE: 78 BPM | WEIGHT: 185 LBS | OXYGEN SATURATION: 97 % | BODY MASS INDEX: 34 KG/M2 | DIASTOLIC BLOOD PRESSURE: 70 MMHG | SYSTOLIC BLOOD PRESSURE: 150 MMHG | TEMPERATURE: 98 F

## 2021-01-18 DIAGNOSIS — I70.0 ATHEROSCLEROSIS OF AORTA (HCC): ICD-10-CM

## 2021-01-18 DIAGNOSIS — I77.1 SUBCLAVIAN ARTERY STENOSIS (HCC): ICD-10-CM

## 2021-01-18 DIAGNOSIS — I65.23 CAROTID STENOSIS, ASYMPTOMATIC, BILATERAL: Primary | ICD-10-CM

## 2021-01-18 DIAGNOSIS — I10 ESSENTIAL HYPERTENSION: ICD-10-CM

## 2021-01-18 PROCEDURE — 99496 TRANSJ CARE MGMT HIGH F2F 7D: CPT | Performed by: FAMILY MEDICINE

## 2021-01-18 RX ORDER — PANTOPRAZOLE SODIUM 40 MG/1
40 TABLET, DELAYED RELEASE ORAL
Qty: 90 TABLET | Refills: 3 | Status: SHIPPED | OUTPATIENT
Start: 2021-01-18

## 2021-01-18 RX ORDER — DIPHENHYDRAMINE HCL 25 MG
25 TABLET ORAL NIGHTLY
COMMUNITY
End: 2021-07-12 | Stop reason: ALTCHOICE

## 2021-01-18 RX ORDER — METOPROLOL SUCCINATE 25 MG/1
25 TABLET, EXTENDED RELEASE ORAL
Qty: 30 TABLET | Refills: 1 | COMMUNITY
Start: 2021-01-18 | End: 2021-03-02

## 2021-01-18 NOTE — PROGRESS NOTES
HPI:    Marge Richards is a 65year old female here today for hospital follow up.    She was discharged from Inpatient hospital, [facility] to Home   Admission Date: 1/12/21   Discharge Date: 1/13/21  Hospital Discharge Diagnoses (since 12/19/2020) edema  No coolness to the left arm or hand. Allergies:  She is allergic to ace inhibitors; diazepam; keflex; lexapro; cozaar [losartan]; levaquin; zyrtec [cetirizine hcl]; and mastisol adhesive.     Current Meds:    •  diphenhydrAMINE HCl 25 MG Oral Tab, T medical history of Abdominal pain (2/16/2019), Anxiety state, unspecified, Back problem, Cataract, Closed fracture of transverse process of lumbar vertebra with routine healing (9/5/2017), Contact dermatitis and other eczema, due to unspecified cause (2/8/ denies any unusual skin lesions  EYES: denies blurred vision or double vision  HEENT: denies nasal congestion, sinus pain or ST  LUNGS: denies shortness of breath with exertion  CARDIOVASCULAR: denies chest pain on exertion or palpitations  GI: denies abdo date above   Medical Decision Making- Based on service period of discharge to 30 days:   · Number of Possible Diagnoses and/or Management Options: moderate  · Amount and/or Complexity of Data to Be Reviewed: moderate  · Risk of Significant Complications, M

## 2021-01-28 ENCOUNTER — OFFICE VISIT (OUTPATIENT)
Dept: CARDIOLOGY | Age: 71
End: 2021-01-28

## 2021-01-28 VITALS
SYSTOLIC BLOOD PRESSURE: 128 MMHG | BODY MASS INDEX: 30.83 KG/M2 | HEIGHT: 63 IN | HEART RATE: 73 BPM | WEIGHT: 174 LBS | DIASTOLIC BLOOD PRESSURE: 58 MMHG

## 2021-01-28 DIAGNOSIS — I65.23 CAROTID STENOSIS, ASYMPTOMATIC, BILATERAL: ICD-10-CM

## 2021-01-28 DIAGNOSIS — I77.1 SUBCLAVIAN ARTERY STENOSIS (CMD): Primary | ICD-10-CM

## 2021-01-28 DIAGNOSIS — I25.10 CORONARY ARTERY DISEASE INVOLVING NATIVE CORONARY ARTERY OF NATIVE HEART WITHOUT ANGINA PECTORIS: ICD-10-CM

## 2021-01-28 PROCEDURE — 99214 OFFICE O/P EST MOD 30 MIN: CPT | Performed by: NURSE PRACTITIONER

## 2021-01-28 RX ORDER — METOPROLOL SUCCINATE 25 MG/1
12.5 TABLET, EXTENDED RELEASE ORAL DAILY
COMMUNITY
Start: 2021-01-18 | End: 2021-06-24 | Stop reason: SDUPTHER

## 2021-01-28 RX ORDER — DIPHENHYDRAMINE HCL 25 MG
25 TABLET ORAL PRN
COMMUNITY

## 2021-01-28 RX ORDER — FLUTICASONE PROPIONATE AND SALMETEROL 250; 50 UG/1; UG/1
1 POWDER RESPIRATORY (INHALATION)
COMMUNITY
Start: 2020-12-23 | End: 2021-04-05

## 2021-01-28 SDOH — SOCIAL STABILITY: SOCIAL NETWORK: ARE YOU MARRIED, WIDOWED, DIVORCED, SEPARATED, NEVER MARRIED, OR LIVING WITH A PARTNER?: MARRIED

## 2021-01-28 SDOH — HEALTH STABILITY: PHYSICAL HEALTH: ON AVERAGE, HOW MANY MINUTES DO YOU ENGAGE IN EXERCISE AT THIS LEVEL?: 0 MIN

## 2021-01-28 SDOH — HEALTH STABILITY: PHYSICAL HEALTH: ON AVERAGE, HOW MANY DAYS PER WEEK DO YOU ENGAGE IN MODERATE TO STRENUOUS EXERCISE (LIKE A BRISK WALK)?: 0 DAYS

## 2021-01-28 ASSESSMENT — ENCOUNTER SYMPTOMS
COUGH: 0
SHORTNESS OF BREATH: 0
ABDOMINAL PAIN: 0
SYNCOPE: 0
BLOATING: 0
NEAR-SYNCOPE: 0
FEVER: 0
NIGHT SWEATS: 0
ORTHOPNEA: 0

## 2021-01-28 ASSESSMENT — PATIENT HEALTH QUESTIONNAIRE - PHQ9
SUM OF ALL RESPONSES TO PHQ9 QUESTIONS 1 AND 2: 0
SUM OF ALL RESPONSES TO PHQ9 QUESTIONS 1 AND 2: 0
CLINICAL INTERPRETATION OF PHQ2 SCORE: NO FURTHER SCREENING NEEDED
2. FEELING DOWN, DEPRESSED OR HOPELESS: NOT AT ALL
CLINICAL INTERPRETATION OF PHQ9 SCORE: NO FURTHER SCREENING NEEDED
1. LITTLE INTEREST OR PLEASURE IN DOING THINGS: NOT AT ALL

## 2021-02-01 DIAGNOSIS — Z23 NEED FOR VACCINATION: ICD-10-CM

## 2021-02-12 ENCOUNTER — LABORATORY ENCOUNTER (OUTPATIENT)
Dept: LAB | Age: 71
End: 2021-02-12
Attending: FAMILY MEDICINE
Payer: MEDICARE

## 2021-02-12 DIAGNOSIS — K76.0 FATTY LIVER: ICD-10-CM

## 2021-02-12 LAB
ALBUMIN SERPL-MCNC: 4 G/DL (ref 3.4–5)
ALBUMIN/GLOB SERPL: 1.2 {RATIO} (ref 1–2)
ALP LIVER SERPL-CCNC: 90 U/L
ALT SERPL-CCNC: 49 U/L
ANION GAP SERPL CALC-SCNC: 6 MMOL/L (ref 0–18)
AST SERPL-CCNC: 37 U/L (ref 15–37)
BASOPHILS # BLD AUTO: 0.05 X10(3) UL (ref 0–0.2)
BASOPHILS NFR BLD AUTO: 0.8 %
BILIRUB SERPL-MCNC: 0.3 MG/DL (ref 0.1–2)
BUN BLD-MCNC: 9 MG/DL (ref 7–18)
BUN/CREAT SERPL: 13.8 (ref 10–20)
CALCIUM BLD-MCNC: 9.1 MG/DL (ref 8.5–10.1)
CHLORIDE SERPL-SCNC: 104 MMOL/L (ref 98–112)
CO2 SERPL-SCNC: 27 MMOL/L (ref 21–32)
CREAT BLD-MCNC: 0.65 MG/DL
DEPRECATED RDW RBC AUTO: 43.1 FL (ref 35.1–46.3)
EOSINOPHIL # BLD AUTO: 0.2 X10(3) UL (ref 0–0.7)
EOSINOPHIL NFR BLD AUTO: 3 %
ERYTHROCYTE [DISTWIDTH] IN BLOOD BY AUTOMATED COUNT: 12.7 % (ref 11–15)
GLOBULIN PLAS-MCNC: 3.3 G/DL (ref 2.8–4.4)
GLUCOSE BLD-MCNC: 100 MG/DL (ref 70–99)
HCT VFR BLD AUTO: 44.2 %
HGB BLD-MCNC: 14 G/DL
IMM GRANULOCYTES # BLD AUTO: 0.01 X10(3) UL (ref 0–1)
IMM GRANULOCYTES NFR BLD: 0.2 %
INR BLD: 0.92 (ref 0.89–1.11)
LYMPHOCYTES # BLD AUTO: 1.69 X10(3) UL (ref 1–4)
LYMPHOCYTES NFR BLD AUTO: 25.5 %
M PROTEIN MFR SERPL ELPH: 7.3 G/DL (ref 6.4–8.2)
MCH RBC QN AUTO: 29.3 PG (ref 26–34)
MCHC RBC AUTO-ENTMCNC: 31.7 G/DL (ref 31–37)
MCV RBC AUTO: 92.5 FL
MONOCYTES # BLD AUTO: 0.49 X10(3) UL (ref 0.1–1)
MONOCYTES NFR BLD AUTO: 7.4 %
NEUTROPHILS # BLD AUTO: 4.19 X10 (3) UL (ref 1.5–7.7)
NEUTROPHILS # BLD AUTO: 4.19 X10(3) UL (ref 1.5–7.7)
NEUTROPHILS NFR BLD AUTO: 63.1 %
OSMOLALITY SERPL CALC.SUM OF ELEC: 283 MOSM/KG (ref 275–295)
PATIENT FASTING Y/N/NP: YES
PLATELET # BLD AUTO: 207 10(3)UL (ref 150–450)
POTASSIUM SERPL-SCNC: 4.3 MMOL/L (ref 3.5–5.1)
PSA SERPL DL<=0.01 NG/ML-MCNC: 12.6 SECONDS (ref 12.4–14.6)
RBC # BLD AUTO: 4.78 X10(6)UL
SODIUM SERPL-SCNC: 137 MMOL/L (ref 136–145)
WBC # BLD AUTO: 6.6 X10(3) UL (ref 4–11)

## 2021-02-12 PROCEDURE — 80053 COMPREHEN METABOLIC PANEL: CPT

## 2021-02-12 PROCEDURE — 85610 PROTHROMBIN TIME: CPT

## 2021-02-12 PROCEDURE — 36415 COLL VENOUS BLD VENIPUNCTURE: CPT

## 2021-02-12 PROCEDURE — 85025 COMPLETE CBC W/AUTO DIFF WBC: CPT

## 2021-03-02 RX ORDER — METOPROLOL SUCCINATE 25 MG/1
25 TABLET, EXTENDED RELEASE ORAL
Qty: 90 TABLET | Refills: 0 | Status: SHIPPED | OUTPATIENT
Start: 2021-03-02 | End: 2021-03-19

## 2021-03-02 NOTE — TELEPHONE ENCOUNTER
LOV: 1/18/21    LAST LAB: 2/12/21    LAST RX: 1/18/21    Next OV:   Future Appointments   Date Time Provider Chandan Cody   3/9/2021 12:30 PM [facility] CT MAIN RM4 [address]   3/19/2021 11:15 AM Nancy Ward DO [facility] [place]   3/24/2021 1

## 2021-03-04 RX ORDER — SIMVASTATIN 20 MG
20 TABLET ORAL NIGHTLY
Qty: 90 TABLET | Refills: 0 | Status: SHIPPED | OUTPATIENT
Start: 2021-03-04 | End: 2021-09-13

## 2021-03-04 NOTE — TELEPHONE ENCOUNTER
Last refill #90 x 1 on 7/20/2020  Last office visit pertaining to refill on 1/15/2021  Future Appointments   Date Time Provider Chandan Cody   3/9/2021 12:30 PM [facility] CT MAIN RM4 [facility] CT Katya Sheehan   3/19/2021 11:15 AM Christina Farias DO [facility]

## 2021-03-09 ENCOUNTER — HOSPITAL ENCOUNTER (OUTPATIENT)
Dept: CT IMAGING | Facility: HOSPITAL | Age: 71
Discharge: HOME | End: 2021-03-09
Attending: INTERNAL MEDICINE
Payer: MEDICARE

## 2021-03-09 DIAGNOSIS — R91.8 PULMONARY NODULES/LESIONS, MULTIPLE: ICD-10-CM

## 2021-03-09 PROCEDURE — 71250 CT THORAX DX C-: CPT | Performed by: INTERNAL MEDICINE

## 2021-03-19 ENCOUNTER — OFFICE VISIT (OUTPATIENT)
Dept: FAMILY MEDICINE CLINIC | Facility: CLINIC | Age: 71
End: 2021-03-19
Payer: MEDICARE

## 2021-03-19 VITALS
TEMPERATURE: 98 F | HEART RATE: 62 BPM | WEIGHT: 176 LBS | OXYGEN SATURATION: 98 % | SYSTOLIC BLOOD PRESSURE: 124 MMHG | HEIGHT: 62 IN | DIASTOLIC BLOOD PRESSURE: 62 MMHG | BODY MASS INDEX: 32.39 KG/M2 | RESPIRATION RATE: 18 BRPM

## 2021-03-19 DIAGNOSIS — H53.9 VISION CHANGES: Primary | ICD-10-CM

## 2021-03-19 DIAGNOSIS — R25.1 TREMOR: ICD-10-CM

## 2021-03-19 PROCEDURE — 99214 OFFICE O/P EST MOD 30 MIN: CPT | Performed by: FAMILY MEDICINE

## 2021-03-19 RX ORDER — METOPROLOL SUCCINATE 25 MG/1
12.5 TABLET, EXTENDED RELEASE ORAL
Qty: 90 TABLET | Refills: 0 | COMMUNITY
Start: 2021-03-19

## 2021-03-19 NOTE — PROGRESS NOTES
Yelitza Pendleton is a 65year old female. Patient presents with: Follow - Up: Room 1-       HPI:   Patient complains of visual changes. She has been to 2 different eye doctors without relief. She is also developing a facial tremor.   She is worried about OIL) 1200 MG Oral Cap, Take by mouth 2 (two) times daily. , Disp: , Rfl:   aspirin 81 MG Oral Chew Tab, Chew 81 mg by mouth daily. , Disp: , Rfl:   Cyanocobalamin (B-12) 500 MCG Oral Tab, Take 1,000 Units by mouth daily.   , Disp: , Rfl:   [DISCONTINUED] Me ENDARTERECTOMY Right 1/21/2014    Performed by Elsy Coffman MD at [facility]   • COLONOSCOPY      2013   • COLONOSCOPY     • ESOPHAGOGASTRODUODENOSCOPY (EGD) N/A 5/13/2020    Performed by Kayla Lamb DO at [facility] (79.8 kg)  01/18/21 : 185 lb (83.9 kg)  01/08/21 : 170 lb (77.1 kg)  07/15/20 : 178 lb 4 oz (80.9 kg)  06/10/20 : 178 lb (80.7 kg)  05/06/20 : 178 lb (80.7 kg)    Ideal body weight: 50.1 kg (110 lb 7.2 oz)  Adjusted ideal body weight: 62 kg (136 lb 10.7 oz reduced mildly bilateral LE at great toes (~5 sec to extinguish)      Proprioception is normal  Romberg is absent     Coordination:  Finger to nose normal bilaterally  Rapid alternating movements normal bilaterally  Heel to shin is normal bilaterally     D

## 2021-03-20 ENCOUNTER — HOSPITAL ENCOUNTER (OUTPATIENT)
Dept: CT IMAGING | Age: 71
Discharge: HOME | End: 2021-03-20
Attending: FAMILY MEDICINE
Payer: MEDICARE

## 2021-03-20 DIAGNOSIS — R25.1 TREMOR: ICD-10-CM

## 2021-03-20 DIAGNOSIS — H53.9 VISION CHANGES: ICD-10-CM

## 2021-03-20 PROCEDURE — 70450 CT HEAD/BRAIN W/O DYE: CPT | Performed by: FAMILY MEDICINE

## 2021-03-23 ENCOUNTER — HOSPITAL ENCOUNTER (OUTPATIENT)
Dept: ULTRASOUND IMAGING | Age: 71
Discharge: HOME | End: 2021-03-23
Attending: INTERNAL MEDICINE
Payer: MEDICARE

## 2021-03-23 DIAGNOSIS — K76.0 FATTY LIVER: ICD-10-CM

## 2021-03-23 PROCEDURE — 76700 US EXAM ABDOM COMPLETE: CPT | Performed by: INTERNAL MEDICINE

## 2021-03-24 ENCOUNTER — HOSPITAL ENCOUNTER (OUTPATIENT)
Dept: CV DIAGNOSTICS | Age: 71
Discharge: HOME | End: 2021-03-24
Attending: NURSE PRACTITIONER
Payer: MEDICARE

## 2021-03-24 DIAGNOSIS — I65.23 ASYMPTOMATIC CAROTID ARTERY STENOSIS, BILATERAL: ICD-10-CM

## 2021-03-24 DIAGNOSIS — I77.1 SUBCLAVIAN ARTERY STENOSIS (HCC): ICD-10-CM

## 2021-03-24 PROCEDURE — 93880 EXTRACRANIAL BILAT STUDY: CPT | Performed by: INTERNAL MEDICINE

## 2021-03-29 ENCOUNTER — APPOINTMENT (OUTPATIENT)
Dept: CARDIOLOGY | Age: 71
End: 2021-03-29

## 2021-04-05 ENCOUNTER — OFFICE VISIT (OUTPATIENT)
Dept: CARDIOLOGY | Age: 71
End: 2021-04-05

## 2021-04-05 VITALS
WEIGHT: 175 LBS | DIASTOLIC BLOOD PRESSURE: 68 MMHG | HEART RATE: 70 BPM | BODY MASS INDEX: 31.01 KG/M2 | HEIGHT: 63 IN | SYSTOLIC BLOOD PRESSURE: 144 MMHG

## 2021-04-05 DIAGNOSIS — I77.1 SUBCLAVIAN ARTERY STENOSIS (CMD): ICD-10-CM

## 2021-04-05 DIAGNOSIS — I10 ESSENTIAL HYPERTENSION: ICD-10-CM

## 2021-04-05 DIAGNOSIS — Z98.890 HISTORY OF RIGHT-SIDED CAROTID ENDARTERECTOMY: ICD-10-CM

## 2021-04-05 DIAGNOSIS — I65.23 CAROTID STENOSIS, ASYMPTOMATIC, BILATERAL: Primary | ICD-10-CM

## 2021-04-05 DIAGNOSIS — R26.2 DIFFICULTY WALKING: ICD-10-CM

## 2021-04-05 PROCEDURE — 99215 OFFICE O/P EST HI 40 MIN: CPT | Performed by: INTERNAL MEDICINE

## 2021-04-05 ASSESSMENT — PATIENT HEALTH QUESTIONNAIRE - PHQ9
1. LITTLE INTEREST OR PLEASURE IN DOING THINGS: NOT AT ALL
CLINICAL INTERPRETATION OF PHQ9 SCORE: NO FURTHER SCREENING NEEDED
CLINICAL INTERPRETATION OF PHQ2 SCORE: NO FURTHER SCREENING NEEDED
SUM OF ALL RESPONSES TO PHQ9 QUESTIONS 1 AND 2: 0
SUM OF ALL RESPONSES TO PHQ9 QUESTIONS 1 AND 2: 0
2. FEELING DOWN, DEPRESSED OR HOPELESS: NOT AT ALL

## 2021-04-05 ASSESSMENT — ENCOUNTER SYMPTOMS
COUGH: 0
FEVER: 0
ALLERGIC/IMMUNOLOGIC COMMENTS: NO NEW FOOD ALLERGIES
HEMOPTYSIS: 0
SUSPICIOUS LESIONS: 0
CHILLS: 0
WEIGHT GAIN: 0
HEMATOCHEZIA: 0
BRUISES/BLEEDS EASILY: 0
WEIGHT LOSS: 0

## 2021-04-07 ENCOUNTER — TELEPHONE (OUTPATIENT)
Dept: FAMILY MEDICINE CLINIC | Facility: CLINIC | Age: 71
End: 2021-04-07

## 2021-04-07 NOTE — TELEPHONE ENCOUNTER
Nathen Vazquez is calling to let us know that she received her [address] 3/10/21  lot # [id number] and her 2nd was Fidel Villa 4/7/21 lot# [id number]

## 2021-04-08 ENCOUNTER — HOSPITAL ENCOUNTER (OUTPATIENT)
Dept: MRI IMAGING | Age: 71
Discharge: HOME | End: 2021-04-08
Attending: INTERNAL MEDICINE
Payer: MEDICARE

## 2021-04-08 DIAGNOSIS — I77.1 SUBCLAVIAN ARTERY STENOSIS (HCC): ICD-10-CM

## 2021-04-08 DIAGNOSIS — I65.23 CAROTID STENOSIS, BILATERAL: ICD-10-CM

## 2021-04-08 DIAGNOSIS — I65.23 CAROTID STENOSIS, NON-SYMPTOMATIC, BILATERAL: ICD-10-CM

## 2021-04-09 ENCOUNTER — HOSPITAL ENCOUNTER (OUTPATIENT)
Dept: MRI IMAGING | Facility: HOSPITAL | Age: 71
Discharge: HOME | End: 2021-04-09
Attending: INTERNAL MEDICINE
Payer: MEDICARE

## 2021-04-09 PROCEDURE — 72158 MRI LUMBAR SPINE W/O & W/DYE: CPT | Performed by: INTERNAL MEDICINE

## 2021-04-09 PROCEDURE — A9575 INJ GADOTERATE MEGLUMI 0.1ML: HCPCS

## 2021-04-09 PROCEDURE — 70553 MRI BRAIN STEM W/O & W/DYE: CPT | Performed by: INTERNAL MEDICINE

## 2021-04-10 RX ORDER — AMLODIPINE BESYLATE 10 MG/1
10 TABLET ORAL DAILY
Qty: 90 TABLET | Refills: 1 | Status: SHIPPED | OUTPATIENT
Start: 2021-04-10 | End: 2021-09-13

## 2021-04-10 NOTE — TELEPHONE ENCOUNTER
LOV: 03/19/21    LAST LAB: 2/12/21    LAST RX: 07/20/20    Next OV:  Future Appointments   Date Time Provider Chandan Glo   4/27/2021 10:20 AM Marie Mo MD PL ENT [facility]   5/26/2021  8:00 AM Y[facility] CARD ANN [facility]IE VIRTUAL ROOM 1 YK CARD Chloe   5/

## 2021-04-15 ENCOUNTER — TELEPHONE (OUTPATIENT)
Dept: FAMILY MEDICINE CLINIC | Facility: CLINIC | Age: 71
End: 2021-04-15

## 2021-04-15 ENCOUNTER — LAB ENCOUNTER (OUTPATIENT)
Dept: LAB | Age: 71
End: 2021-04-15
Attending: FAMILY MEDICINE
Payer: MEDICARE

## 2021-04-15 DIAGNOSIS — I65.23 CAROTID STENOSIS, ASYMPTOMATIC, BILATERAL: Primary | ICD-10-CM

## 2021-04-15 DIAGNOSIS — I77.1 SUBCLAVIAN ARTERY STENOSIS (HCC): ICD-10-CM

## 2021-04-15 PROCEDURE — 36415 COLL VENOUS BLD VENIPUNCTURE: CPT

## 2021-04-15 PROCEDURE — 85652 RBC SED RATE AUTOMATED: CPT

## 2021-04-23 ENCOUNTER — TELEPHONE (OUTPATIENT)
Dept: CARDIOLOGY | Age: 71
End: 2021-04-23

## 2021-04-23 ENCOUNTER — E-ADVICE (OUTPATIENT)
Dept: CARDIOLOGY | Age: 71
End: 2021-04-23

## 2021-05-06 PROBLEM — R60.1 GENERALIZED EDEMA: Status: ACTIVE | Noted: 2020-07-27

## 2021-05-06 PROBLEM — Z98.890 HISTORY OF RIGHT-SIDED CAROTID ENDARTERECTOMY: Status: ACTIVE | Noted: 2020-07-27

## 2021-05-06 PROBLEM — R26.2 DIFFICULTY WALKING: Status: ACTIVE | Noted: 2021-04-05

## 2021-05-13 ENCOUNTER — HOSPITAL ENCOUNTER (OUTPATIENT)
Dept: ULTRASOUND IMAGING | Age: 71
Discharge: HOME | End: 2021-05-13
Attending: INTERNAL MEDICINE
Payer: MEDICARE

## 2021-05-13 DIAGNOSIS — R13.13 PHARYNGEAL DYSPHAGIA: ICD-10-CM

## 2021-05-13 PROCEDURE — 76536 US EXAM OF HEAD AND NECK: CPT | Performed by: INTERNAL MEDICINE

## 2021-05-26 ENCOUNTER — HOSPITAL ENCOUNTER (OUTPATIENT)
Dept: CV DIAGNOSTICS | Age: 71
Discharge: HOME | End: 2021-05-26
Attending: INTERNAL MEDICINE
Payer: MEDICARE

## 2021-05-26 DIAGNOSIS — I77.1 SUBCLAVIAN ARTERY STENOSIS (HCC): ICD-10-CM

## 2021-05-26 DIAGNOSIS — I65.23 ASYMPTOMATIC CAROTID ARTERY STENOSIS, BILATERAL: ICD-10-CM

## 2021-05-26 PROCEDURE — 93975 VASCULAR STUDY: CPT | Performed by: INTERNAL MEDICINE

## 2021-05-26 PROCEDURE — X1094 NO CHARGE VISIT: HCPCS | Performed by: INTERNAL MEDICINE

## 2021-05-26 PROCEDURE — 93931 UPPER EXTREMITY STUDY: CPT | Performed by: INTERNAL MEDICINE

## 2021-06-01 ENCOUNTER — TELEPHONE (OUTPATIENT)
Dept: CARDIOLOGY | Age: 71
End: 2021-06-01

## 2021-06-03 DIAGNOSIS — I77.1 SUBCLAVIAN ARTERY STENOSIS (CMD): ICD-10-CM

## 2021-06-03 DIAGNOSIS — I65.23 CAROTID STENOSIS, ASYMPTOMATIC, BILATERAL: ICD-10-CM

## 2021-06-21 ENCOUNTER — OFFICE VISIT (OUTPATIENT)
Dept: CARDIOLOGY | Age: 71
End: 2021-06-21

## 2021-06-21 VITALS
DIASTOLIC BLOOD PRESSURE: 60 MMHG | HEART RATE: 71 BPM | BODY MASS INDEX: 30.12 KG/M2 | WEIGHT: 170 LBS | HEIGHT: 63 IN | SYSTOLIC BLOOD PRESSURE: 120 MMHG

## 2021-06-21 DIAGNOSIS — I77.1 STENOSIS OF LEFT SUBCLAVIAN ARTERY (CMD): ICD-10-CM

## 2021-06-21 DIAGNOSIS — I10 ESSENTIAL HYPERTENSION: ICD-10-CM

## 2021-06-21 DIAGNOSIS — Z98.890 HISTORY OF RIGHT-SIDED CAROTID ENDARTERECTOMY: Primary | ICD-10-CM

## 2021-06-21 DIAGNOSIS — I25.10 CORONARY ARTERY DISEASE INVOLVING NATIVE CORONARY ARTERY OF NATIVE HEART WITHOUT ANGINA PECTORIS: ICD-10-CM

## 2021-06-21 PROCEDURE — 99215 OFFICE O/P EST HI 40 MIN: CPT | Performed by: INTERNAL MEDICINE

## 2021-06-21 ASSESSMENT — ENCOUNTER SYMPTOMS
WEIGHT LOSS: 0
ALLERGIC/IMMUNOLOGIC COMMENTS: NO NEW FOOD ALLERGIES
CHILLS: 0
SUSPICIOUS LESIONS: 0
BRUISES/BLEEDS EASILY: 0
HEMOPTYSIS: 0
FEVER: 0
COUGH: 0
HEMATOCHEZIA: 0
WEIGHT GAIN: 0

## 2021-06-21 ASSESSMENT — PATIENT HEALTH QUESTIONNAIRE - PHQ9
CLINICAL INTERPRETATION OF PHQ2 SCORE: NO FURTHER SCREENING NEEDED
1. LITTLE INTEREST OR PLEASURE IN DOING THINGS: NOT AT ALL
CLINICAL INTERPRETATION OF PHQ9 SCORE: NO FURTHER SCREENING NEEDED
2. FEELING DOWN, DEPRESSED OR HOPELESS: NOT AT ALL
SUM OF ALL RESPONSES TO PHQ9 QUESTIONS 1 AND 2: 0
SUM OF ALL RESPONSES TO PHQ9 QUESTIONS 1 AND 2: 0

## 2021-06-25 RX ORDER — METOPROLOL SUCCINATE 25 MG/1
12.5 TABLET, EXTENDED RELEASE ORAL DAILY
Qty: 45 TABLET | Refills: 3 | Status: SHIPPED | OUTPATIENT
Start: 2021-06-25

## 2021-07-12 ENCOUNTER — LAB ENCOUNTER (OUTPATIENT)
Dept: LAB | Age: 71
End: 2021-07-12
Attending: FAMILY MEDICINE
Payer: MEDICARE

## 2021-07-12 ENCOUNTER — OFFICE VISIT (OUTPATIENT)
Dept: FAMILY MEDICINE CLINIC | Facility: CLINIC | Age: 71
End: 2021-07-12
Payer: MEDICARE

## 2021-07-12 VITALS
OXYGEN SATURATION: 97 % | WEIGHT: 173 LBS | HEIGHT: 61 IN | SYSTOLIC BLOOD PRESSURE: 120 MMHG | HEART RATE: 68 BPM | BODY MASS INDEX: 32.66 KG/M2 | DIASTOLIC BLOOD PRESSURE: 64 MMHG | TEMPERATURE: 98 F

## 2021-07-12 DIAGNOSIS — D64.9 ANEMIA, UNSPECIFIED TYPE: ICD-10-CM

## 2021-07-12 DIAGNOSIS — E78.5 HYPERLIPIDEMIA, UNSPECIFIED HYPERLIPIDEMIA TYPE: ICD-10-CM

## 2021-07-12 DIAGNOSIS — I10 ESSENTIAL HYPERTENSION: ICD-10-CM

## 2021-07-12 DIAGNOSIS — R73.01 IMPAIRED FASTING GLUCOSE: ICD-10-CM

## 2021-07-12 DIAGNOSIS — I70.0 ATHEROSCLEROSIS OF AORTA (HCC): ICD-10-CM

## 2021-07-12 DIAGNOSIS — Z00.00 ENCOUNTER FOR ANNUAL HEALTH EXAMINATION: Primary | ICD-10-CM

## 2021-07-12 DIAGNOSIS — Z12.31 VISIT FOR SCREENING MAMMOGRAM: ICD-10-CM

## 2021-07-12 DIAGNOSIS — Z13.31 DEPRESSION SCREENING: ICD-10-CM

## 2021-07-12 PROBLEM — I77.1 SUBCLAVIAN ARTERY STENOSIS (HCC): Status: RESOLVED | Noted: 2019-07-18 | Resolved: 2021-07-12

## 2021-07-12 PROBLEM — R26.2 DIFFICULTY WALKING: Status: RESOLVED | Noted: 2021-04-05 | Resolved: 2021-07-12

## 2021-07-12 PROBLEM — I77.1 SUBCLAVIAN ARTERY STENOSIS: Status: RESOLVED | Noted: 2019-07-18 | Resolved: 2021-07-12

## 2021-07-12 PROBLEM — Z98.890 HISTORY OF RIGHT-SIDED CAROTID ENDARTERECTOMY: Status: RESOLVED | Noted: 2020-07-27 | Resolved: 2021-07-12

## 2021-07-12 PROBLEM — R60.1 GENERALIZED EDEMA: Status: RESOLVED | Noted: 2020-07-27 | Resolved: 2021-07-12

## 2021-07-12 PROBLEM — I77.1 SUBCLAVIAN ARTERIAL STENOSIS (HCC): Status: RESOLVED | Noted: 2021-01-12 | Resolved: 2021-07-12

## 2021-07-12 PROBLEM — I77.1 SUBCLAVIAN ARTERIAL STENOSIS: Status: RESOLVED | Noted: 2021-01-12 | Resolved: 2021-07-12

## 2021-07-12 LAB
ALBUMIN SERPL-MCNC: 3.8 G/DL (ref 3.4–5)
ALBUMIN/GLOB SERPL: 1.2 {RATIO} (ref 1–2)
ALP LIVER SERPL-CCNC: 83 U/L
ALT SERPL-CCNC: 23 U/L
ANION GAP SERPL CALC-SCNC: 4 MMOL/L (ref 0–18)
AST SERPL-CCNC: 20 U/L (ref 15–37)
BASOPHILS # BLD AUTO: 0.04 X10(3) UL (ref 0–0.2)
BASOPHILS NFR BLD AUTO: 0.6 %
BILIRUB SERPL-MCNC: 0.3 MG/DL (ref 0.1–2)
BUN BLD-MCNC: 12 MG/DL (ref 7–18)
BUN/CREAT SERPL: 19 (ref 10–20)
CALCIUM BLD-MCNC: 8.7 MG/DL (ref 8.5–10.1)
CHLORIDE SERPL-SCNC: 100 MMOL/L (ref 98–112)
CHOLEST SMN-MCNC: 136 MG/DL (ref ?–200)
CO2 SERPL-SCNC: 30 MMOL/L (ref 21–32)
CREAT BLD-MCNC: 0.63 MG/DL
DEPRECATED RDW RBC AUTO: 44.2 FL (ref 35.1–46.3)
EOSINOPHIL # BLD AUTO: 0.15 X10(3) UL (ref 0–0.7)
EOSINOPHIL NFR BLD AUTO: 2.1 %
ERYTHROCYTE [DISTWIDTH] IN BLOOD BY AUTOMATED COUNT: 13.2 % (ref 11–15)
GLOBULIN PLAS-MCNC: 3.2 G/DL (ref 2.8–4.4)
GLUCOSE BLD-MCNC: 88 MG/DL (ref 70–99)
HCT VFR BLD AUTO: 38 %
HDLC SERPL-MCNC: 43 MG/DL (ref 40–59)
HGB BLD-MCNC: 12.5 G/DL
IMM GRANULOCYTES # BLD AUTO: 0.01 X10(3) UL (ref 0–1)
IMM GRANULOCYTES NFR BLD: 0.1 %
LDLC SERPL CALC-MCNC: 71 MG/DL (ref ?–100)
LYMPHOCYTES # BLD AUTO: 1.68 X10(3) UL (ref 1–4)
LYMPHOCYTES NFR BLD AUTO: 23.1 %
M PROTEIN MFR SERPL ELPH: 7 G/DL (ref 6.4–8.2)
MCH RBC QN AUTO: 30 PG (ref 26–34)
MCHC RBC AUTO-ENTMCNC: 32.9 G/DL (ref 31–37)
MCV RBC AUTO: 91.3 FL
MONOCYTES # BLD AUTO: 0.6 X10(3) UL (ref 0.1–1)
MONOCYTES NFR BLD AUTO: 8.3 %
NEUTROPHILS # BLD AUTO: 4.79 X10 (3) UL (ref 1.5–7.7)
NEUTROPHILS # BLD AUTO: 4.79 X10(3) UL (ref 1.5–7.7)
NEUTROPHILS NFR BLD AUTO: 65.8 %
NONHDLC SERPL-MCNC: 93 MG/DL (ref ?–130)
OSMOLALITY SERPL CALC.SUM OF ELEC: 277 MOSM/KG (ref 275–295)
PLATELET # BLD AUTO: 192 10(3)UL (ref 150–450)
POTASSIUM SERPL-SCNC: 4.3 MMOL/L (ref 3.5–5.1)
RBC # BLD AUTO: 4.16 X10(6)UL
SODIUM SERPL-SCNC: 134 MMOL/L (ref 136–145)
TRIGL SERPL-MCNC: 121 MG/DL (ref 30–149)
VLDLC SERPL CALC-MCNC: 18 MG/DL (ref 0–30)
WBC # BLD AUTO: 7.3 X10(3) UL (ref 4–11)

## 2021-07-12 PROCEDURE — 80061 LIPID PANEL: CPT

## 2021-07-12 PROCEDURE — G0444 DEPRESSION SCREEN ANNUAL: HCPCS | Performed by: FAMILY MEDICINE

## 2021-07-12 PROCEDURE — 83036 HEMOGLOBIN GLYCOSYLATED A1C: CPT

## 2021-07-12 PROCEDURE — 36415 COLL VENOUS BLD VENIPUNCTURE: CPT

## 2021-07-12 PROCEDURE — G0439 PPPS, SUBSEQ VISIT: HCPCS | Performed by: FAMILY MEDICINE

## 2021-07-12 PROCEDURE — 85025 COMPLETE CBC W/AUTO DIFF WBC: CPT

## 2021-07-12 PROCEDURE — 80053 COMPREHEN METABOLIC PANEL: CPT

## 2021-07-12 NOTE — PATIENT INSTRUCTIONS
Leann Chatman's SCREENING SCHEDULE   Tests on this list are recommended by your physician but may not be covered, or covered at this frequency, by your insurer. Please check with your insurance carrier before scheduling to verify coverage.    BHANU No recommendations at this time   Pap and Pelvic    Pap   Covered every 2 years for women at normal risk;  Annually if at high risk -  No recommendations at this time    Chlamydia Annually if high risk -  No recommendations at this time   Screening [place])  www. [email or web address]. org  This link also has information from the [address] [place] [address] regarding Advance Directives.

## 2021-07-12 NOTE — PROGRESS NOTES
HPI:   Lindsey Jackson is a 66year old female who presents for a Medicare Subsequent Annual Wellness visit (Pt already had Initial Annual Wellness).     No compaints         Fall/Risk Assessment abnormal    She has been screened for Falls and is High Risk as PCP - General (Family Practice)  Monica Henley, PT as Physical Therapist  Victory Goodell, MD (Cardiovascular Diseases)  Jaye Quiros, EMILY (Speech Therapist)  Yvrose Hernandez MD (GASTROENTEROLOGY)    Patient Active Problem List: Hemorrhoids, Herpes zoster (6/8/2015), High blood pressure, High cholesterol, History of lumbar discectomy (9/25/2014), History of right-sided carotid endarterectomy (7/27/2020), HTN (hypertension), Hyperlipidemia, Impaired fasting glucose, Irritable bowel no complaint of urinary incontinence       EXAM:   There were no vitals taken for this visit. Estimated body mass index is 31.5 kg/m² as calculated from the following:    Height as of 5/6/21: 5' 2\" (1.575 m).     Weight as of 5/6/21: 172 lb 3.2 oz (78.1 kg (64457) 10/20/2017, 10/22/2018   • Fluzone Vaccine Medicare () 09/05/2019   • HEP B, Adult 01/17/2017, 02/17/2017, 07/18/2017   • Hep B, Unspecified Formulation 01/17/2017, 02/17/2017, 07/18/2017   • Influenza 02/20/2007, 10/11/2007, 10/07/2008, 09/15 agrees to the plan. Reinforced healthy diet, lifestyle, and exercise. No follow-ups on file.      Morris ChuarDO, 7/12/2021     General Health     In the past six months, have you lost more than 10 pounds without trying?: 2 - No  Has your appetit following:    Colonoscopy   Covered every 10 years    Covered every 2 years if patient is at high risk or previous colonoscopy was abnormal 04/16/2019    Colonoscopy due on 07/11/2023    Flexible Sigmoidoscopy   Covered every 4 years -    Fecal Occult Bloo

## 2021-07-13 LAB
EST. AVERAGE GLUCOSE BLD GHB EST-MCNC: 114 MG/DL (ref 68–126)
HBA1C MFR BLD HPLC: 5.6 % (ref ?–5.7)

## 2021-07-19 ENCOUNTER — APPOINTMENT (OUTPATIENT)
Dept: CARDIOLOGY | Age: 71
End: 2021-07-19

## 2021-07-20 ENCOUNTER — TELEPHONE (OUTPATIENT)
Dept: FAMILY MEDICINE CLINIC | Facility: CLINIC | Age: 71
End: 2021-07-20

## 2021-07-20 DIAGNOSIS — M79.605 CHRONIC PAIN OF LEFT LOWER EXTREMITY: Primary | ICD-10-CM

## 2021-07-20 DIAGNOSIS — G89.29 CHRONIC PAIN OF LEFT LOWER EXTREMITY: Primary | ICD-10-CM

## 2021-07-20 NOTE — TELEPHONE ENCOUNTER
Chris Jelly is requesting a referral for ortho. She has a hx of LLE pain/weakness post a fall. This happened several yrs ago. She was seen by Dr. Rosa Knapp, but would like to see someone different now. Please advise who you recommend.

## 2021-07-20 NOTE — TELEPHONE ENCOUNTER
Lodema Boast is calling she would like to know of another orthopedic doctor other then Dr Hanna Sumner, please call

## 2021-08-09 ENCOUNTER — TELEPHONE (OUTPATIENT)
Dept: FAMILY MEDICINE CLINIC | Facility: CLINIC | Age: 71
End: 2021-08-09

## 2021-08-09 NOTE — TELEPHONE ENCOUNTER
Pt called asking if we can send her most recent lab results to Dr. Bela Drake in [place]. Pt provided fax number: [phone]    Labs forwarded.

## 2021-08-12 ENCOUNTER — TELEPHONE (OUTPATIENT)
Dept: FAMILY MEDICINE CLINIC | Facility: CLINIC | Age: 71
End: 2021-08-12

## 2021-08-12 NOTE — TELEPHONE ENCOUNTER
Lodema Boast had an annual px on 7/12. She states she has an upcoming eye lid surgery on 8/26 with Dr. Cuellar Selfridge    She states she needs a note stating she is medically cleared for surgery.  States Dr. Mike Varela told he could write one as long as her labs were n

## 2021-08-12 NOTE — TELEPHONE ENCOUNTER
Pt. Having eye surgery 8-26-21 and she is saying she already spoke with Dr. Erasto Cabrera and she does not need appt. To be seen for pre-op?

## 2021-08-12 NOTE — TELEPHONE ENCOUNTER
Jannet Black, DO  You 11 minutes ago (2:35 PM)     Ok to send letter she is medically cleared for surgery

## 2021-09-08 ENCOUNTER — APPOINTMENT (OUTPATIENT)
Dept: CARDIOLOGY | Age: 71
End: 2021-09-08

## 2021-09-13 RX ORDER — SIMVASTATIN 20 MG
20 TABLET ORAL NIGHTLY
Qty: 90 TABLET | Refills: 0 | Status: SHIPPED | OUTPATIENT
Start: 2021-09-13 | End: 2021-11-29

## 2021-09-13 RX ORDER — AMLODIPINE BESYLATE 10 MG/1
10 TABLET ORAL DAILY
Qty: 90 TABLET | Refills: 1 | Status: SHIPPED | OUTPATIENT
Start: 2021-09-13 | End: 2022-01-31

## 2021-09-13 NOTE — TELEPHONE ENCOUNTER
Amlodipine   Last refill: 04/10/21  Qty: 90  W/ 1 refills  Last ov: 07/12/21    Simvastatin   Last refill: 03/04/21  Qty: 90  W/ 0 refills  Last ov 07/12/21  Requested Prescriptions     Pending Prescriptions Disp Refills   • amLODIPine 10 MG Oral Tab 90 ta

## 2021-09-13 NOTE — TELEPHONE ENCOUNTER
amLODIPine Besylate 10 MG Oral Tab and SIMVASTATIN 20 MG Oral Tab both 90 days please call into [email or web address] mail order

## 2021-09-20 ENCOUNTER — OFFICE VISIT (OUTPATIENT)
Dept: FAMILY MEDICINE CLINIC | Facility: CLINIC | Age: 71
End: 2021-09-20
Payer: MEDICARE

## 2021-09-20 ENCOUNTER — TELEPHONE (OUTPATIENT)
Dept: FAMILY MEDICINE CLINIC | Facility: CLINIC | Age: 71
End: 2021-09-20

## 2021-09-20 VITALS
BODY MASS INDEX: 32.1 KG/M2 | WEIGHT: 170 LBS | HEIGHT: 61 IN | DIASTOLIC BLOOD PRESSURE: 70 MMHG | HEART RATE: 73 BPM | TEMPERATURE: 99 F | OXYGEN SATURATION: 100 % | SYSTOLIC BLOOD PRESSURE: 120 MMHG | RESPIRATION RATE: 18 BRPM

## 2021-09-20 DIAGNOSIS — R47.1 DYSARTHRIA: Primary | ICD-10-CM

## 2021-09-20 DIAGNOSIS — M26.622 ARTHRALGIA OF LEFT TEMPOROMANDIBULAR JOINT: ICD-10-CM

## 2021-09-20 PROCEDURE — 99214 OFFICE O/P EST MOD 30 MIN: CPT | Performed by: FAMILY MEDICINE

## 2021-09-20 RX ORDER — SERTRALINE HYDROCHLORIDE 25 MG/1
25 TABLET, FILM COATED ORAL DAILY
COMMUNITY

## 2021-09-20 NOTE — TELEPHONE ENCOUNTER
It would be best for her to check with her insurance to see which oral surgeons are on her plan and let me know or see the list and I can help decide.

## 2021-09-20 NOTE — TELEPHONE ENCOUNTER
THE SPECIALIST DR [facility] TOLD HER ABOUT THIS MORNING DOES NOT TAKE HER INSURANCE. CAN HE REFER HER TO SOMEONE THAT DOES?

## 2021-09-20 NOTE — PROGRESS NOTES
Junito Sawyer is a 66year old female. Patient presents with: Other: update on things--still having trouble with speech and eating. ...saw Dr. Alpa Barlow and was referred out to other docs-also who should she see to get jaw straightened out??   room 2      H Apply topically as needed. , Disp: , Rfl:   Vitamin D3 (VITAMIN D3) 2000 UNITS Oral Cap, Take by mouth daily. , Disp: , Rfl:   thioTHIXene (NAVANE) 1 MG Oral Cap, Take 1 capsule by mouth 3 (three) times daily.  (Patient taking differently: Take 1 mg by mo UNLISTED Right 1992    MRI compatable wire placed   • MASTECTOMY LEFT      has implants- not due to CA   • MASTECTOMY RIGHT      has implants-not due to CA   • OTHER SURGICAL HISTORY  01/12/2021    Stent subclavian artery Dr. Petrona Musa     Family History   P lesions  HEENT: atraumatic, normocephalic, R TM normal, L TM normal, Pharynx normal  NECK: supple, no cervical adenopathy  LUNGS: clear to auscultation  CARDIO: RRR without murmur            ASSESSMENT AND PLAN:     Dysarthria  (primary encounter diagnosis

## 2021-09-20 NOTE — TELEPHONE ENCOUNTER
She states Dr. Gwendolyn Alexander does not accept her insurance. Please advise who you recommend. Floresita Roy(Resident)

## 2021-10-12 ENCOUNTER — HOSPITAL ENCOUNTER (OUTPATIENT)
Dept: CT IMAGING | Age: 71
Discharge: HOME | End: 2021-10-12
Attending: NURSE PRACTITIONER
Payer: MEDICARE

## 2021-10-12 ENCOUNTER — APPOINTMENT (OUTPATIENT)
Dept: CARDIOLOGY | Age: 71
End: 2021-10-12

## 2021-10-12 DIAGNOSIS — I25.10 CAD (CORONARY ARTERY DISEASE): ICD-10-CM

## 2021-10-12 PROCEDURE — 70450 CT HEAD/BRAIN W/O DYE: CPT | Performed by: NURSE PRACTITIONER

## 2021-10-14 ENCOUNTER — TELEPHONE (OUTPATIENT)
Dept: FAMILY MEDICINE CLINIC | Facility: CLINIC | Age: 71
End: 2021-10-14

## 2021-10-14 ENCOUNTER — IMMUNIZATION (OUTPATIENT)
Dept: FAMILY MEDICINE CLINIC | Facility: CLINIC | Age: 71
End: 2021-10-14
Payer: MEDICARE

## 2021-10-14 DIAGNOSIS — Z23 NEED FOR VACCINATION: Primary | ICD-10-CM

## 2021-10-14 PROCEDURE — G0008 ADMIN INFLUENZA VIRUS VAC: HCPCS | Performed by: FAMILY MEDICINE

## 2021-10-14 PROCEDURE — 90662 IIV NO PRSV INCREASED AG IM: CPT | Performed by: FAMILY MEDICINE

## 2021-10-14 NOTE — TELEPHONE ENCOUNTER
Katarzyna Gravely states she went to see Dr. Millicent Jacome at [facility] for an eyelid problem. He performed a procedure where he cut part of her eyelid. She is now having vision problems and looking for another specialist, an \"oculoplastics specialist\".     Please advise

## 2021-10-18 NOTE — TELEPHONE ENCOUNTER
Jannet Black, DO  You 3 days ago       I don’t have a specific name but I believe there is an occuloplastics specialist at [facility]

## 2021-10-20 ENCOUNTER — TELEPHONE (OUTPATIENT)
Dept: FAMILY MEDICINE CLINIC | Facility: CLINIC | Age: 71
End: 2021-10-20

## 2021-10-20 NOTE — TELEPHONE ENCOUNTER
Pt advised she was referred to see neurology regarding speech impairment. She has not scheduled an appt. Provided referral information. She will be calling to schedule. \"I would like her to see neurology.   Her symptoms are very suspicious for a smal

## 2021-10-20 NOTE — TELEPHONE ENCOUNTER
Maria Elena Garcia is calling she went to Dr Alyssia Hein and he said that he wasn't able to help she needs to go to a regular dentist please call

## 2021-11-11 ENCOUNTER — OFFICE VISIT (OUTPATIENT)
Dept: NEUROLOGY | Facility: CLINIC | Age: 71
End: 2021-11-11
Payer: MEDICARE

## 2021-11-11 ENCOUNTER — LABORATORY ENCOUNTER (OUTPATIENT)
Dept: LAB | Age: 71
End: 2021-11-11
Attending: FAMILY MEDICINE
Payer: MEDICARE

## 2021-11-11 ENCOUNTER — TELEPHONE (OUTPATIENT)
Dept: NEUROLOGY | Facility: CLINIC | Age: 71
End: 2021-11-11

## 2021-11-11 VITALS
SYSTOLIC BLOOD PRESSURE: 110 MMHG | OXYGEN SATURATION: 100 % | DIASTOLIC BLOOD PRESSURE: 60 MMHG | BODY MASS INDEX: 31.15 KG/M2 | HEART RATE: 63 BPM | HEIGHT: 61 IN | RESPIRATION RATE: 18 BRPM | WEIGHT: 165 LBS

## 2021-11-11 DIAGNOSIS — R47.1 DYSARTHRIA: Primary | ICD-10-CM

## 2021-11-11 DIAGNOSIS — R53.1 WEAKNESS: ICD-10-CM

## 2021-11-11 DIAGNOSIS — R47.1 DYSARTHRIA: ICD-10-CM

## 2021-11-11 DIAGNOSIS — H53.2 DIPLOPIA: ICD-10-CM

## 2021-11-11 DIAGNOSIS — G20 PARKINSONISM, UNSPECIFIED PARKINSONISM TYPE (HCC): Primary | ICD-10-CM

## 2021-11-11 DIAGNOSIS — G20 PARKINSONISM, UNSPECIFIED PARKINSONISM TYPE (HCC): ICD-10-CM

## 2021-11-11 PROCEDURE — 84238 ASSAY NONENDOCRINE RECEPTOR: CPT

## 2021-11-11 PROCEDURE — 83519 RIA NONANTIBODY: CPT

## 2021-11-11 PROCEDURE — 86255 FLUORESCENT ANTIBODY SCREEN: CPT

## 2021-11-11 PROCEDURE — 83516 IMMUNOASSAY NONANTIBODY: CPT

## 2021-11-11 PROCEDURE — 99202 OFFICE O/P NEW SF 15 MIN: CPT | Performed by: HOSPITALIST

## 2021-11-11 PROCEDURE — 36415 COLL VENOUS BLD VENIPUNCTURE: CPT

## 2021-11-11 PROCEDURE — 82550 ASSAY OF CK (CPK): CPT

## 2021-11-11 NOTE — PROGRESS NOTES
New patient for Dysarthria- Patient states she has been experiencing slurred speech for about 1 year. Patient states her bottom lip \"quivers\" & it \"hangs\". Patient states people have trouble understanding her.  Patient does not know for sure if this is

## 2021-11-11 NOTE — TELEPHONE ENCOUNTER
Pt calling with questions regarding her Rx and the pharmacy. Her AVS states the mail order pharmacy.   Spoke with Sunny Watson, she is correcting to go to [facility]

## 2021-11-11 NOTE — H&P
Neurology H&P    Deborah Eduardo Patient Status:  No patient class for patient encounter    1950 MRN [id number]   Location [address] Attending No att. providers found   Hosp Day # 0 PCP Teddy Bowens DO     Subjective:  Deborah Eduardo Powder, Breath Activated Inhale 1 puff into the lungs 2 (two) times a day. (Patient taking differently: Inhale 1 puff into the lungs daily as needed.) 1 each 1   • Clopidogrel Bisulfate 75 MG Oral Tab Take 75 mg by mouth daily.      • Multiple Vitamins-Mine Osteoarthritis of lumbar spine    • Psoriasis    • Subclavian arterial stenosis ([facility] [address] 1/12/2021   • Vitamin D deficiency 9/20/2012       PSHx:  Past Surgical History:   Procedure Laterality Date   • BACK SURGERY  2007   • BACK SURGERY  09/18/2017    kyphop person, place, and time. Attention: normal. Concentration: normal.   Level of consciousness: alert  Knowledge: good. Normal comprehension. Speech: spastic dysarthria      Cranial Nerves     CN II   Visual fields full to confrontation.      CN III, reach out to me immediately. Otherwise, I anticipate a phone call next week Monday with an update.  -Myasthenia gravis panel labs ordered.   -Low suspicion for an ischemic stroke, but would continue current medications.   MRI negative stroke is a possibili

## 2021-11-11 NOTE — TELEPHONE ENCOUNTER
Re-ordered to local pharmacy. [facility], spoke with [facility] and cancelled with them.

## 2021-11-15 ENCOUNTER — TELEPHONE (OUTPATIENT)
Dept: NEUROLOGY | Facility: CLINIC | Age: 71
End: 2021-11-15

## 2021-11-15 NOTE — TELEPHONE ENCOUNTER
Pt has decided she will not take the new medication until all of her bloodwork results have been received. Please call her once all results have come in.

## 2021-11-15 NOTE — TELEPHONE ENCOUNTER
Spoke with pt. States that she picked up medication but has not started taking, saw that blood work came back normal and wasn't sure she should start medication. Explained that only one of the three tests ordered have been resulted.  Others still in pro

## 2021-11-18 NOTE — TELEPHONE ENCOUNTER
Pt had all labs done, is wondering if she needs any more testing done? Wondering status for her now. Pt still has not started meds waiting to hear what to do.

## 2021-11-19 NOTE — TELEPHONE ENCOUNTER
Spoke with pt at length. States she is not comfortable moving forward with Sinement trial. States she takes many medications and does not want to add anything further at this time.      States she feels diplopia due to dry eye and eye closing (?) is wor

## 2021-11-19 NOTE — TELEPHONE ENCOUNTER
All labs completed. Per below, pt wanting to know if anything else is needed. Was waiting to have blood work come back before starting.

## 2021-11-22 NOTE — TELEPHONE ENCOUNTER
It is her choice, but I won't be able to help her if she is unwilling to at least try it. It is a safe medication, and it would only be a trial for a week or so.

## 2021-11-24 ENCOUNTER — HOSPITAL ENCOUNTER (OUTPATIENT)
Dept: ULTRASOUND IMAGING | Age: 71
Discharge: HOME | End: 2021-11-24
Attending: INTERNAL MEDICINE
Payer: MEDICARE

## 2021-11-24 DIAGNOSIS — I77.1 SUBCLAVIAN ARTERY STENOSIS (HCC): ICD-10-CM

## 2021-11-24 DIAGNOSIS — I65.23 CAROTID STENOSIS, ASYMPTOMATIC, BILATERAL: ICD-10-CM

## 2021-11-24 PROCEDURE — 93931 UPPER EXTREMITY STUDY: CPT | Performed by: INTERNAL MEDICINE

## 2021-11-29 RX ORDER — SIMVASTATIN 20 MG
TABLET ORAL
Qty: 90 TABLET | Refills: 1 | Status: SHIPPED | OUTPATIENT
Start: 2021-11-29

## 2021-11-29 NOTE — TELEPHONE ENCOUNTER
Requested Prescriptions     Pending Prescriptions Disp Refills   • SIMVASTATIN 20 MG Oral Tab [Pharmacy Med Name: SIMVASTATIN 20 MG Tablet] 90 tablet 0     Sig: TAKE 1 TABLET EVERY NIGHT     Last refill #90 on 9/13/2021  Last office visit pertaining to ref

## 2021-12-02 ENCOUNTER — HOSPITAL ENCOUNTER (OUTPATIENT)
Dept: ULTRASOUND IMAGING | Age: 71
Discharge: HOME | End: 2021-12-02
Attending: NURSE PRACTITIONER
Payer: MEDICARE

## 2021-12-02 DIAGNOSIS — I65.23 CAROTID STENOSIS, ASYMPTOMATIC, BILATERAL: ICD-10-CM

## 2021-12-02 DIAGNOSIS — I25.10 CORONARY ARTERY DISEASE INVOLVING NATIVE CORONARY ARTERY OF NATIVE HEART WITHOUT ANGINA PECTORIS: ICD-10-CM

## 2021-12-02 PROCEDURE — 76770 US EXAM ABDO BACK WALL COMP: CPT | Performed by: NURSE PRACTITIONER

## 2022-01-03 ENCOUNTER — APPOINTMENT (OUTPATIENT)
Dept: CARDIOLOGY | Age: 72
End: 2022-01-03

## 2022-01-31 RX ORDER — AMLODIPINE BESYLATE 10 MG/1
10 TABLET ORAL DAILY
Qty: 90 TABLET | Refills: 1 | Status: SHIPPED | OUTPATIENT
Start: 2022-01-31

## 2022-01-31 RX ORDER — AMLODIPINE BESYLATE 10 MG/1
TABLET ORAL
Qty: 90 TABLET | Refills: 1 | OUTPATIENT
Start: 2022-01-31

## 2022-01-31 NOTE — TELEPHONE ENCOUNTER
Requested Prescriptions     Pending Prescriptions Disp Refills   • AMLODIPINE 10 MG Oral Tab [Pharmacy Med Name: AMLODIPINE BESYLATE 10 MG Tablet] 90 tablet 1     Sig: TAKE 1 TABLET EVERY DAY     Last office visit pertaining to refill on 9/2/2021  Melinda gould

## 2022-01-31 NOTE — TELEPHONE ENCOUNTER
Spoke to [facility]. She states prescription was filled in Sept 2021 and November 2021. New prescription is requested so she does not run out of meds.        LOV: 07/12/21    LAST LAB: 7/12/21    LAST RX: 09/3/21    Next OV:   Future Appointments   Date

## 2022-01-31 NOTE — TELEPHONE ENCOUNTER
Pt calls stating she rec'd a denial for her Amlodipine 10 mg on MyChart. Advised pt there is 1 refill left on file attached to her 9/13/21 refill. Pt states her bottle says there are no more refills and she has not rec'd anything in the mail.      Mitchel

## 2022-02-09 ENCOUNTER — TELEPHONE (OUTPATIENT)
Dept: FAMILY MEDICINE CLINIC | Facility: CLINIC | Age: 72
End: 2022-02-09

## 2022-02-09 LAB
AMB EXT BILIRUBIN, TOTAL: 0.7 MG/DL (ref 0–1)
AMB EXT BUN: 11 MG/DL (ref 7–25)
AMB EXT CALCIUM: 9.3 (ref 8.3–10.5)
AMB EXT CARBON DIOXIDE: 28 (ref 21–31)
AMB EXT CHLORIDE: 91 (ref 98–107)
AMB EXT CMP ALT: 11 U/L (ref 9–43)
AMB EXT CMP AST: 19 U/L (ref 13–39)
AMB EXT CREATININE: 0.65 MG/DL (ref 0.6–1.3)
AMB EXT GLUCOSE: 127 MG/DL (ref 70–100)
AMB EXT HEMATOCRIT: 32.4 (ref 37.4–48.3)
AMB EXT HEMOGLOBIN: 11.2 (ref 11.9–15.8)
AMB EXT MCV: 89 (ref 82–99)
AMB EXT PLATELETS: 168 (ref 150–450)
AMB EXT POSTASSIUM: 4.2 MMOL/L (ref 3.5–5.1)
AMB EXT SODIUM: 127 MMOL/L (ref 133–146)
AMB EXT TOTAL PROTEIN: 6.1 (ref 6.4–8.3)
AMB EXT WBC: 9.5 X10(3)UL (ref 3.6–10.2)

## 2022-02-10 NOTE — TELEPHONE ENCOUNTER
Pt states she fell down the stairs going to her basement. She fractured her shoulder. She went to [facility] and was advised to follow-up with Dr. Shaila Garza. Pt wants to know if she should follow-up with you. She states her pain is moderate to severe. She is on Norco 5-325 Q6H prn.

## 2022-02-10 NOTE — TELEPHONE ENCOUNTER
Discussed with Dr. Anyi Amezquita. He agrees that C/Marv Christine should follow-up with Dr. Liana Fox regarding shoulder injury. May follow-up with Dr. Anyi Amezquita next week or with Jese Gomes tomorrow. Pt would like to be seen next week. Advised she may take Norco AND ibuprofen 400-600mg for pain (per Dr. Mariela Gordon verbal recommendations). Advised spouse he may adjust sling if needed and should continue wearing it until seen by ortho. Verbalized understanding.

## 2022-02-11 ENCOUNTER — TELEPHONE (OUTPATIENT)
Dept: FAMILY MEDICINE CLINIC | Facility: CLINIC | Age: 72
End: 2022-02-11

## 2022-02-11 RX ORDER — HYDROCODONE BITARTRATE AND ACETAMINOPHEN 5; 325 MG/1; MG/1
1-2 TABLET ORAL EVERY 6 HOURS PRN
Qty: 42 TABLET | Refills: 0 | Status: SHIPPED | OUTPATIENT
Start: 2022-02-11 | End: 2022-02-18

## 2022-02-11 RX ORDER — HYDROCODONE BITARTRATE AND ACETAMINOPHEN 5; 325 MG/1; MG/1
1-2 TABLET ORAL EVERY 6 HOURS PRN
COMMUNITY
Start: 2022-02-09 | End: 2022-02-14

## 2022-02-11 NOTE — TELEPHONE ENCOUNTER
Patient fell down her stairs on 2/9/2022 and was evaluated in the ER at [facility]. She fractured her humeral head. She is scheduled for follow up with Dr. Kurtis Reis on 2/17 and Dr. Helena Yoder on 2/15/2022. She was given a script for norco 5-325 #16.  is asking for another refill on this medication until she can be seen by a physician. Patient states that she is taking two the norco at the same time and would like to know if the dose of the medication could be increased.

## 2022-02-11 NOTE — TELEPHONE ENCOUNTER
NEED REFILL ON-PAIN MED THAT WAS FROM [facility], FROM A FALL-    PLEASE CALL PT -  [address] [facility] [address], [address] [facility] [address] [phone], [phone]

## 2022-02-11 NOTE — TELEPHONE ENCOUNTER
40  Prescription to [facility]  Sent  1-2  q6 hours    Needs sooner appointment    Refer to   Dr Isela Carolina    [address], [place], [address]   Phone: [phone]      [address] !!!     To see if she can get in sooner

## 2022-02-14 ENCOUNTER — OFFICE VISIT (OUTPATIENT)
Dept: FAMILY MEDICINE CLINIC | Facility: CLINIC | Age: 72
End: 2022-02-14
Payer: MEDICARE

## 2022-02-14 ENCOUNTER — TELEPHONE (OUTPATIENT)
Dept: FAMILY MEDICINE CLINIC | Facility: CLINIC | Age: 72
End: 2022-02-14

## 2022-02-14 VITALS
SYSTOLIC BLOOD PRESSURE: 130 MMHG | OXYGEN SATURATION: 91 % | TEMPERATURE: 98 F | DIASTOLIC BLOOD PRESSURE: 64 MMHG | HEART RATE: 71 BPM

## 2022-02-14 DIAGNOSIS — S42.291G CLOSED FRACTURE OF HEAD OF RIGHT HUMERUS WITH DELAYED HEALING, SUBSEQUENT ENCOUNTER: Primary | ICD-10-CM

## 2022-02-14 DIAGNOSIS — J44.0 COPD (CHRONIC OBSTRUCTIVE PULMONARY DISEASE) WITH ACUTE BRONCHITIS (HCC): ICD-10-CM

## 2022-02-14 DIAGNOSIS — J20.9 COPD (CHRONIC OBSTRUCTIVE PULMONARY DISEASE) WITH ACUTE BRONCHITIS (HCC): ICD-10-CM

## 2022-02-14 PROCEDURE — 1111F DSCHRG MED/CURRENT MED MERGE: CPT | Performed by: FAMILY MEDICINE

## 2022-02-14 PROCEDURE — 99214 OFFICE O/P EST MOD 30 MIN: CPT | Performed by: FAMILY MEDICINE

## 2022-02-14 RX ORDER — IBUPROFEN 600 MG/1
600 TABLET ORAL EVERY 8 HOURS PRN
Qty: 60 TABLET | Refills: 0 | Status: SHIPPED | OUTPATIENT
Start: 2022-02-14 | End: 2022-02-22

## 2022-02-14 RX ORDER — AZITHROMYCIN 250 MG/1
TABLET, FILM COATED ORAL
Qty: 6 TABLET | Refills: 0 | Status: SHIPPED | OUTPATIENT
Start: 2022-02-14 | End: 2022-02-19

## 2022-02-14 RX ORDER — HYDROCODONE BITARTRATE AND ACETAMINOPHEN 5; 325 MG/1; MG/1
1-2 TABLET ORAL EVERY 6 HOURS PRN
Qty: 30 TABLET | Refills: 0 | Status: SHIPPED | OUTPATIENT
Start: 2022-02-14 | End: 2022-02-22

## 2022-02-14 NOTE — TELEPHONE ENCOUNTER
Reports chills, cough/congestion x 2-3 days. She would like to be seen by Dr. Emmanuel Seen only. Also wants to talk to him about her shoulder pain. She is taking Norco x 2 tabs and ibuprofen 600mg which is helping. Appt rescheduled for today in the sick clinic.      Future Appointments   Date Time Provider Chandan Glo   2/14/2022  3:40 PM Caralyn Collet,  [facility] [place]

## 2022-02-15 ENCOUNTER — TELEPHONE (OUTPATIENT)
Dept: FAMILY MEDICINE CLINIC | Facility: CLINIC | Age: 72
End: 2022-02-15

## 2022-02-15 NOTE — TELEPHONE ENCOUNTER
Had a difficult time adjusting the sling, but pt and spouse have figured it out now. Denies any further questions/concerns. Pt and spouse would like to see Dr. Arina Borges after Dr. Tyler Burnett. Will update.

## 2022-02-22 ENCOUNTER — TELEPHONE (OUTPATIENT)
Dept: FAMILY MEDICINE CLINIC | Facility: CLINIC | Age: 72
End: 2022-02-22

## 2022-02-22 RX ORDER — IBUPROFEN 600 MG/1
600 TABLET ORAL EVERY 8 HOURS PRN
Qty: 60 TABLET | Refills: 0 | Status: SHIPPED | OUTPATIENT
Start: 2022-02-22 | End: 2022-02-22 | Stop reason: DRUGHIGH

## 2022-02-22 RX ORDER — IBUPROFEN 800 MG/1
TABLET ORAL
Qty: 60 TABLET | Refills: 0 | Status: SHIPPED | OUTPATIENT
Start: 2022-02-22

## 2022-02-22 RX ORDER — HYDROCODONE BITARTRATE AND ACETAMINOPHEN 5; 325 MG/1; MG/1
1 TABLET ORAL EVERY 8 HOURS PRN
Qty: 30 TABLET | Refills: 0 | COMMUNITY
Start: 2022-02-22

## 2022-02-22 NOTE — TELEPHONE ENCOUNTER
LOV 02/14/22    LAST LAB 07/12/21    LAST RX 02/14/22    Next OV No future appointments.       PROTOCOL:  n/a

## 2022-02-22 NOTE — TELEPHONE ENCOUNTER
Indiosukhwinder Henry would like to start weaning off of Norco. She is still in moderate pain and taking Norco 1 tab about every 6hrs. Requesting ibuprofen to be increased to 800mg if possible so that she can try taking that for pain instead of the Norco. Please advise.

## 2022-02-22 NOTE — TELEPHONE ENCOUNTER
Discussed with Dr. Yanelis Paz. Okay to increase ibuprofen dose to 800mg. Script sent. Norco - take 1 tab q8h prn x 3 days, then may decrease to 1 tab q12hr prn, the discontinue.

## 2022-02-24 RX ORDER — HYDROCODONE BITARTRATE AND ACETAMINOPHEN 5; 325 MG/1; MG/1
1 TABLET ORAL EVERY 8 HOURS PRN
Qty: 30 TABLET | Refills: 0 | Status: CANCELLED | OUTPATIENT
Start: 2022-02-24

## 2022-02-24 NOTE — TELEPHONE ENCOUNTER
Hx of fall with c/o right shoulder pain. She has a humeral fracture. Saw Dr. Stephanie Monique run 2/14. She is taking Norco, but working on weaning off of it. She has tried taking ibuprofen only, but it does not relieve pain. She requests a prescription for Norco. She took last dose today. Referred to request prescription through Dr. Sveta Rasmussen. Pt states [address] [facility] was prescribed by Dr. Stephanie Monique. Please advise.

## 2022-02-24 NOTE — TELEPHONE ENCOUNTER
She should contact Dr. Michelle Silva. Looks like Dr. Mikala Morel prescribed prior to establishing with Dr. Michelle Silva.

## 2022-02-24 NOTE — TELEPHONE ENCOUNTER
The ibuprofen is not working so she is requesting norco 5/325. Dr. Linsey Ahumada is her primary until he retires.    [facility]

## 2022-02-28 ENCOUNTER — TELEPHONE (OUTPATIENT)
Dept: FAMILY MEDICINE CLINIC | Facility: CLINIC | Age: 72
End: 2022-02-28

## 2022-02-28 NOTE — TELEPHONE ENCOUNTER
Pt's spouse advised.      Future Appointments   Date Time Provider Chandan Cody   3/4/2022  1:15 PM REF [facility] REF [facility] Ref Lab [facility]

## 2022-02-28 NOTE — TELEPHONE ENCOUNTER
Concepcion Cabral was told that her sodium level was low when she went to [facility] on 2/9. She wants to know if she should be concerned. Please advise.

## 2022-03-04 ENCOUNTER — LABORATORY ENCOUNTER (OUTPATIENT)
Dept: LAB | Age: 72
End: 2022-03-04
Attending: INTERNAL MEDICINE
Payer: MEDICARE

## 2022-03-04 DIAGNOSIS — E87.1 HYPONATREMIA: ICD-10-CM

## 2022-03-04 LAB
ANION GAP SERPL CALC-SCNC: 5 MMOL/L (ref 0–18)
BUN BLD-MCNC: 12 MG/DL (ref 7–18)
CALCIUM BLD-MCNC: 9.4 MG/DL (ref 8.5–10.1)
CHLORIDE SERPL-SCNC: 98 MMOL/L (ref 98–112)
CREAT BLD-MCNC: 0.63 MG/DL
FASTING STATUS PATIENT QL REPORTED: NO
GLUCOSE BLD-MCNC: 115 MG/DL (ref 70–99)
OSMOLALITY SERPL CALC.SUM OF ELEC: 285 MOSM/KG (ref 275–295)
POTASSIUM SERPL-SCNC: 4.6 MMOL/L (ref 3.5–5.1)
SODIUM SERPL-SCNC: 137 MMOL/L (ref 136–145)

## 2022-03-04 PROCEDURE — 80048 BASIC METABOLIC PNL TOTAL CA: CPT

## 2022-03-04 PROCEDURE — 36415 COLL VENOUS BLD VENIPUNCTURE: CPT

## 2022-03-16 ENCOUNTER — TELEPHONE (OUTPATIENT)
Dept: FAMILY MEDICINE CLINIC | Facility: CLINIC | Age: 72
End: 2022-03-16

## 2022-03-16 ENCOUNTER — NURSE ONLY (OUTPATIENT)
Dept: FAMILY MEDICINE CLINIC | Facility: CLINIC | Age: 72
End: 2022-03-16
Payer: MEDICARE

## 2022-03-16 DIAGNOSIS — S42.291G CLOSED FRACTURE OF HEAD OF RIGHT HUMERUS WITH DELAYED HEALING, SUBSEQUENT ENCOUNTER: Primary | ICD-10-CM

## 2022-03-16 PROCEDURE — A4565 SLINGS: HCPCS | Performed by: FAMILY MEDICINE

## 2022-03-16 NOTE — TELEPHONE ENCOUNTER
PATIENT NEEDS TO GET A NEW SLING HER  WASHED THE ONE SHE HAD AND THE FOAM CAME OUT, CAN SHE GET A NEW ONE

## 2022-03-16 NOTE — PROGRESS NOTES
Spouse is here to pick-up a new sling for Leann. He brought the old one back and needs a size Medium. Leann cannot come into the office b/c of her arm pain and difficulty ambulating.

## 2022-03-17 ENCOUNTER — NURSE ONLY (OUTPATIENT)
Dept: FAMILY MEDICINE CLINIC | Facility: CLINIC | Age: 72
End: 2022-03-17
Payer: MEDICARE

## 2022-03-17 ENCOUNTER — TELEPHONE (OUTPATIENT)
Dept: FAMILY MEDICINE CLINIC | Facility: CLINIC | Age: 72
End: 2022-03-17

## 2022-03-17 DIAGNOSIS — S42.291G CLOSED FRACTURE OF HEAD OF RIGHT HUMERUS WITH DELAYED HEALING, SUBSEQUENT ENCOUNTER: Primary | ICD-10-CM

## 2022-03-17 NOTE — PROGRESS NOTES
Primitivo Oradz is here for nurse to check arm sling. Per pt, she states that a piece of foam is missing on the sling that  picked up on 3/16/22. Reassured patient that all slings available in the office only have a piece of white foam that should sit behind neck. Assisted patient with putting new sling on. Reassured that a size medium sling fits properly and she does not need a smaller size. Spouse and patient verbalized understanding.

## 2022-05-18 ENCOUNTER — PATIENT OUTREACH (OUTPATIENT)
Dept: FAMILY MEDICINE CLINIC | Facility: CLINIC | Age: 72
End: 2022-05-18

## 2022-05-23 ENCOUNTER — TELEPHONE (OUTPATIENT)
Dept: FAMILY MEDICINE CLINIC | Facility: CLINIC | Age: 72
End: 2022-05-23

## 2022-05-23 NOTE — TELEPHONE ENCOUNTER
CAN  FILL OUT PERMANENT HANDICAPP PLACARD FORM, SHE HAS SPINAL STENOSIS & DOES NOT WANT TO HAVE SURGERY

## 2022-05-23 NOTE — TELEPHONE ENCOUNTER
Dr. Irvin Stafford has completed the form. Pt will need to complete her portion and sign the form also. Pt verbalized understanding.

## 2022-06-08 ENCOUNTER — HOSPITAL ENCOUNTER (OUTPATIENT)
Dept: ULTRASOUND IMAGING | Facility: HOSPITAL | Age: 72
Discharge: HOME | End: 2022-06-08
Attending: NURSE PRACTITIONER
Payer: MEDICARE

## 2022-06-08 ENCOUNTER — HOSPITAL ENCOUNTER (OUTPATIENT)
Dept: CT IMAGING | Facility: HOSPITAL | Age: 72
Discharge: HOME | End: 2022-06-08
Attending: INTERNAL MEDICINE
Payer: MEDICARE

## 2022-06-08 DIAGNOSIS — R60.1 GENERALIZED EDEMA: ICD-10-CM

## 2022-06-08 DIAGNOSIS — R91.8 MULTIPLE PULMONARY NODULES: ICD-10-CM

## 2022-06-08 PROCEDURE — 71250 CT THORAX DX C-: CPT | Performed by: INTERNAL MEDICINE

## 2022-06-08 PROCEDURE — 93971 EXTREMITY STUDY: CPT | Performed by: NURSE PRACTITIONER

## 2022-06-13 ENCOUNTER — TELEPHONE (OUTPATIENT)
Dept: FAMILY MEDICINE CLINIC | Facility: CLINIC | Age: 72
End: 2022-06-13

## 2022-06-13 DIAGNOSIS — M25.569 KNEE PAIN, UNSPECIFIED CHRONICITY, UNSPECIFIED LATERALITY: ICD-10-CM

## 2022-06-13 DIAGNOSIS — M79.659 PAIN OF THIGH, UNSPECIFIED LATERALITY: Primary | ICD-10-CM

## 2022-06-13 NOTE — TELEPHONE ENCOUNTER
Pain from thigh to knee. Saw Dr. Mark Arango and unhappy with care. She would like a new ortho referral.     Suggested Dr. Magdalene Pham, but pt does not want to travel to [facility]. Please advise.

## 2022-06-13 NOTE — TELEPHONE ENCOUNTER
Rachel Antonio is calling she needs to know the name of a good neelam forrester but not Dr Jus Bonilla with [address]

## 2022-06-20 DIAGNOSIS — E78.5 HYPERLIPIDEMIA, UNSPECIFIED HYPERLIPIDEMIA TYPE: Primary | ICD-10-CM

## 2022-06-20 DIAGNOSIS — E78.5 HYPERLIPIDEMIA, UNSPECIFIED HYPERLIPIDEMIA TYPE: ICD-10-CM

## 2022-06-20 RX ORDER — SIMVASTATIN 20 MG
20 TABLET ORAL NIGHTLY
Qty: 90 TABLET | Refills: 0 | Status: SHIPPED | OUTPATIENT
Start: 2022-06-20 | End: 2022-06-20

## 2022-06-20 RX ORDER — SIMVASTATIN 20 MG
20 TABLET ORAL NIGHTLY
Qty: 90 TABLET | Refills: 0 | Status: SHIPPED | OUTPATIENT
Start: 2022-06-20

## 2022-06-20 NOTE — TELEPHONE ENCOUNTER
LOV 2/14/22    LAST LAB 03/04/22    LAST RX: 11/29/21    Next OV:   Future Appointments   Date Time Provider Chandan Glo   7/5/2022 10:45 AM Josiane Godoy MD [facility] [place]   8/2/2022  9:00 AM PF [address]       PROTOCOL  Cholesterol Medication Protocol Passed 06/20/2022 02:40 PM   Protocol Details  ALT < 80    ALT resulted within past year    Lipid panel within past 12 months    Appointment within past 12 or next 3 months

## 2022-06-20 NOTE — TELEPHONE ENCOUNTER
Dana Pittman is calling her Simvastatin should have been sent to [facility] order not [facility] could we please get this corrected

## 2022-06-23 ENCOUNTER — MED REC SCAN ONLY (OUTPATIENT)
Dept: FAMILY MEDICINE CLINIC | Facility: CLINIC | Age: 72
End: 2022-06-23

## 2022-08-02 ENCOUNTER — HOSPITAL ENCOUNTER (OUTPATIENT)
Dept: ULTRASOUND IMAGING | Age: 72
Discharge: HOME | End: 2022-08-02
Attending: INTERNAL MEDICINE
Payer: MEDICARE

## 2022-08-02 DIAGNOSIS — K76.0 NAFLD (NONALCOHOLIC FATTY LIVER DISEASE): ICD-10-CM

## 2022-08-02 PROCEDURE — 76981 USE PARENCHYMA: CPT | Performed by: INTERNAL MEDICINE

## 2022-08-02 PROCEDURE — 76705 ECHO EXAM OF ABDOMEN: CPT | Performed by: INTERNAL MEDICINE

## 2022-08-03 ENCOUNTER — LAB ENCOUNTER (OUTPATIENT)
Dept: LAB | Age: 72
End: 2022-08-03
Attending: INTERNAL MEDICINE
Payer: MEDICARE

## 2022-08-03 ENCOUNTER — OFFICE VISIT (OUTPATIENT)
Dept: FAMILY MEDICINE CLINIC | Facility: CLINIC | Age: 72
End: 2022-08-03
Payer: MEDICARE

## 2022-08-03 VITALS
WEIGHT: 157 LBS | OXYGEN SATURATION: 98 % | SYSTOLIC BLOOD PRESSURE: 146 MMHG | TEMPERATURE: 98 F | RESPIRATION RATE: 18 BRPM | HEART RATE: 71 BPM | DIASTOLIC BLOOD PRESSURE: 78 MMHG | BODY MASS INDEX: 28.17 KG/M2 | HEIGHT: 62.5 IN

## 2022-08-03 DIAGNOSIS — E78.5 HYPERLIPIDEMIA, UNSPECIFIED HYPERLIPIDEMIA TYPE: ICD-10-CM

## 2022-08-03 DIAGNOSIS — G20 PARKINSONISM, UNSPECIFIED PARKINSONISM TYPE (HCC): ICD-10-CM

## 2022-08-03 DIAGNOSIS — I77.1 SUBCLAVIAN ARTERY STENOSIS (HCC): ICD-10-CM

## 2022-08-03 DIAGNOSIS — J44.0 COPD (CHRONIC OBSTRUCTIVE PULMONARY DISEASE) WITH ACUTE BRONCHITIS (HCC): ICD-10-CM

## 2022-08-03 DIAGNOSIS — I70.0 ATHEROSCLEROSIS OF AORTA (HCC): ICD-10-CM

## 2022-08-03 DIAGNOSIS — I10 PRIMARY HYPERTENSION: ICD-10-CM

## 2022-08-03 DIAGNOSIS — I10 ESSENTIAL HYPERTENSION: ICD-10-CM

## 2022-08-03 DIAGNOSIS — Z00.00 ENCOUNTER FOR ANNUAL HEALTH EXAMINATION: ICD-10-CM

## 2022-08-03 DIAGNOSIS — R73.01 IMPAIRED FASTING GLUCOSE: Primary | ICD-10-CM

## 2022-08-03 DIAGNOSIS — J20.9 COPD (CHRONIC OBSTRUCTIVE PULMONARY DISEASE) WITH ACUTE BRONCHITIS (HCC): ICD-10-CM

## 2022-08-03 LAB
ALBUMIN SERPL-MCNC: 4.1 G/DL (ref 3.4–5)
ALBUMIN/GLOB SERPL: 1.2 {RATIO} (ref 1–2)
ALP LIVER SERPL-CCNC: 91 U/L
ALT SERPL-CCNC: 21 U/L
ANION GAP SERPL CALC-SCNC: 6 MMOL/L (ref 0–18)
AST SERPL-CCNC: 18 U/L (ref 15–37)
BASOPHILS # BLD AUTO: 0.02 X10(3) UL (ref 0–0.2)
BASOPHILS NFR BLD AUTO: 0.3 %
BILIRUB SERPL-MCNC: 0.4 MG/DL (ref 0.1–2)
BUN BLD-MCNC: 10 MG/DL (ref 7–18)
CALCIUM BLD-MCNC: 9.7 MG/DL (ref 8.5–10.1)
CHLORIDE SERPL-SCNC: 99 MMOL/L (ref 98–112)
CHOLEST SERPL-MCNC: 136 MG/DL (ref ?–200)
CO2 SERPL-SCNC: 29 MMOL/L (ref 21–32)
CREAT BLD-MCNC: 0.69 MG/DL
EOSINOPHIL # BLD AUTO: 0.12 X10(3) UL (ref 0–0.7)
EOSINOPHIL NFR BLD AUTO: 1.5 %
ERYTHROCYTE [DISTWIDTH] IN BLOOD BY AUTOMATED COUNT: 13.5 %
GFR SERPLBLD BASED ON 1.73 SQ M-ARVRAT: 91 ML/MIN/1.73M2 (ref 60–?)
GLOBULIN PLAS-MCNC: 3.4 G/DL (ref 2.8–4.4)
GLUCOSE BLD-MCNC: 99 MG/DL (ref 70–99)
HCT VFR BLD AUTO: 41.9 %
HDLC SERPL-MCNC: 55 MG/DL (ref 40–59)
HGB BLD-MCNC: 13.9 G/DL
IMM GRANULOCYTES # BLD AUTO: 0.02 X10(3) UL (ref 0–1)
IMM GRANULOCYTES NFR BLD: 0.3 %
LDLC SERPL CALC-MCNC: 68 MG/DL (ref ?–100)
LYMPHOCYTES # BLD AUTO: 1.9 X10(3) UL (ref 1–4)
LYMPHOCYTES NFR BLD AUTO: 24.2 %
MCH RBC QN AUTO: 31.1 PG (ref 26–34)
MCHC RBC AUTO-ENTMCNC: 33.2 G/DL (ref 31–37)
MCV RBC AUTO: 93.7 FL
MONOCYTES # BLD AUTO: 0.45 X10(3) UL (ref 0.1–1)
MONOCYTES NFR BLD AUTO: 5.7 %
NEUTROPHILS # BLD AUTO: 5.33 X10 (3) UL (ref 1.5–7.7)
NEUTROPHILS # BLD AUTO: 5.33 X10(3) UL (ref 1.5–7.7)
NEUTROPHILS NFR BLD AUTO: 68 %
NONHDLC SERPL-MCNC: 81 MG/DL (ref ?–130)
OSMOLALITY SERPL CALC.SUM OF ELEC: 277 MOSM/KG (ref 275–295)
PLATELET # BLD AUTO: 174 10(3)UL (ref 150–450)
POTASSIUM SERPL-SCNC: 4.7 MMOL/L (ref 3.5–5.1)
PROT SERPL-MCNC: 7.5 G/DL (ref 6.4–8.2)
RBC # BLD AUTO: 4.47 X10(6)UL
SODIUM SERPL-SCNC: 134 MMOL/L (ref 136–145)
TRIGL SERPL-MCNC: 65 MG/DL (ref 30–149)
VLDLC SERPL CALC-MCNC: 10 MG/DL (ref 0–30)
WBC # BLD AUTO: 7.8 X10(3) UL (ref 4–11)

## 2022-08-03 PROCEDURE — 80061 LIPID PANEL: CPT | Performed by: INTERNAL MEDICINE

## 2022-08-03 PROCEDURE — 36415 COLL VENOUS BLD VENIPUNCTURE: CPT | Performed by: INTERNAL MEDICINE

## 2022-08-03 PROCEDURE — 1125F AMNT PAIN NOTED PAIN PRSNT: CPT | Performed by: INTERNAL MEDICINE

## 2022-08-03 PROCEDURE — 80053 COMPREHEN METABOLIC PANEL: CPT | Performed by: INTERNAL MEDICINE

## 2022-08-03 PROCEDURE — 85025 COMPLETE CBC W/AUTO DIFF WBC: CPT | Performed by: INTERNAL MEDICINE

## 2022-08-03 PROCEDURE — G0439 PPPS, SUBSEQ VISIT: HCPCS | Performed by: INTERNAL MEDICINE

## 2022-08-04 PROBLEM — G20 PARKINSONISM, UNSPECIFIED PARKINSONISM TYPE (HCC): Status: ACTIVE | Noted: 2022-08-04

## 2022-08-04 PROBLEM — G20.C PARKINSONISM, UNSPECIFIED PARKINSONISM TYPE (HCC): Status: ACTIVE | Noted: 2022-08-04

## 2022-08-04 PROBLEM — G20.C PARKINSONISM, UNSPECIFIED PARKINSONISM TYPE: Status: ACTIVE | Noted: 2022-08-04

## 2022-08-04 PROBLEM — G20 PARKINSONISM, UNSPECIFIED PARKINSONISM TYPE: Status: ACTIVE | Noted: 2022-08-04

## 2022-08-24 ENCOUNTER — TELEPHONE (OUTPATIENT)
Dept: FAMILY MEDICINE CLINIC | Facility: CLINIC | Age: 72
End: 2022-08-24

## 2022-08-24 DIAGNOSIS — E78.5 HYPERLIPIDEMIA, UNSPECIFIED HYPERLIPIDEMIA TYPE: ICD-10-CM

## 2022-08-24 RX ORDER — SIMVASTATIN 20 MG
20 TABLET ORAL NIGHTLY
Qty: 90 TABLET | Refills: 1 | Status: SHIPPED | OUTPATIENT
Start: 2022-08-24

## 2022-08-24 NOTE — TELEPHONE ENCOUNTER
Last refill: 6/20/22 #90 w/ 0 refills    Last OV: 8/3/22  Last labs: 8/3/22    Future Appointments   Date Time Provider Chandan Cody   8/25/2022 11:15 AM Milan Huerta MD [facility] [place]

## 2022-08-25 ENCOUNTER — OFFICE VISIT (OUTPATIENT)
Dept: FAMILY MEDICINE CLINIC | Facility: CLINIC | Age: 72
End: 2022-08-25
Payer: MEDICARE

## 2022-08-25 VITALS
TEMPERATURE: 98 F | OXYGEN SATURATION: 100 % | HEART RATE: 74 BPM | WEIGHT: 155 LBS | SYSTOLIC BLOOD PRESSURE: 134 MMHG | RESPIRATION RATE: 18 BRPM | BODY MASS INDEX: 28 KG/M2 | DIASTOLIC BLOOD PRESSURE: 78 MMHG

## 2022-08-25 DIAGNOSIS — R68.84 PAIN IN LOWER JAW: Primary | ICD-10-CM

## 2022-08-25 PROCEDURE — 99214 OFFICE O/P EST MOD 30 MIN: CPT | Performed by: INTERNAL MEDICINE

## 2022-08-25 RX ORDER — CYCLOBENZAPRINE HCL 10 MG
TABLET ORAL
COMMUNITY
Start: 2022-03-02

## 2022-08-25 RX ORDER — NALOXONE HYDROCHLORIDE 4 MG/.1ML
4 SPRAY NASAL
COMMUNITY
Start: 2022-03-18

## 2022-08-25 RX ORDER — CLONAZEPAM 0.5 MG/1
TABLET ORAL
COMMUNITY
Start: 2022-03-25

## 2022-08-25 RX ORDER — ERYTHROMYCIN 5 MG/G
OINTMENT OPHTHALMIC
COMMUNITY
Start: 2022-07-12

## 2022-08-25 RX ORDER — GABAPENTIN 100 MG/1
100 CAPSULE ORAL 2 TIMES DAILY
COMMUNITY
Start: 2022-03-10

## 2022-08-25 RX ORDER — BENZTROPINE MESYLATE 0.5 MG/1
1 TABLET ORAL DAILY
COMMUNITY
Start: 2022-02-07

## 2022-09-07 RX ORDER — AMLODIPINE BESYLATE 10 MG/1
10 TABLET ORAL DAILY
Qty: 90 TABLET | Refills: 1 | Status: SHIPPED | OUTPATIENT
Start: 2022-09-07

## 2022-09-08 ENCOUNTER — TELEPHONE (OUTPATIENT)
Dept: FAMILY MEDICINE CLINIC | Facility: CLINIC | Age: 72
End: 2022-09-08

## 2022-09-08 NOTE — TELEPHONE ENCOUNTER
Patient advised. She said she is taking Simvastatin. She said she is going to wean off the Navane, and take every other day.

## 2022-09-08 NOTE — TELEPHONE ENCOUNTER
Pt has been taking amLODIPine for a long time. She was reading the side effects to the medication today. She wonders if her stiff muscles are from the medication.

## 2022-09-12 ENCOUNTER — TELEPHONE (OUTPATIENT)
Dept: FAMILY MEDICINE CLINIC | Facility: CLINIC | Age: 72
End: 2022-09-12

## 2022-09-12 NOTE — TELEPHONE ENCOUNTER
Patient advised to do a rapid home Covid test and if it is negative she should go to [facility] to be evaluated.  JAZMYNE. Dr Samir Guerrero RN

## 2022-09-12 NOTE — TELEPHONE ENCOUNTER
Carmencita Guzman is calling she said that she is coming down with a sore throat and she would like to know if she get a rapid COVID test please call

## 2022-09-13 ENCOUNTER — OFFICE VISIT (OUTPATIENT)
Dept: FAMILY MEDICINE CLINIC | Facility: CLINIC | Age: 72
End: 2022-09-13
Payer: MEDICARE

## 2022-09-13 VITALS
HEIGHT: 62 IN | SYSTOLIC BLOOD PRESSURE: 156 MMHG | BODY MASS INDEX: 27.6 KG/M2 | WEIGHT: 150 LBS | DIASTOLIC BLOOD PRESSURE: 78 MMHG | HEART RATE: 83 BPM | RESPIRATION RATE: 18 BRPM | OXYGEN SATURATION: 97 % | TEMPERATURE: 98 F

## 2022-09-13 DIAGNOSIS — J06.9 VIRAL URI: Primary | ICD-10-CM

## 2022-09-13 DIAGNOSIS — R03.0 ELEVATED BLOOD PRESSURE READING: ICD-10-CM

## 2022-09-13 LAB
OPERATOR ID: NORMAL
POCT LOT NUMBER: NORMAL
RAPID SARS-COV-2 BY PCR: NOT DETECTED

## 2022-09-13 PROCEDURE — U0002 COVID-19 LAB TEST NON-CDC: HCPCS | Performed by: NURSE PRACTITIONER

## 2022-09-13 PROCEDURE — 99213 OFFICE O/P EST LOW 20 MIN: CPT | Performed by: NURSE PRACTITIONER

## 2022-09-29 ENCOUNTER — TELEPHONE (OUTPATIENT)
Dept: FAMILY MEDICINE CLINIC | Facility: CLINIC | Age: 72
End: 2022-09-29

## 2022-09-29 NOTE — TELEPHONE ENCOUNTER
Pt tongue is swollen & cracked. Pt has appt with ENT on Monday. She doesn't know what to do before this appt. Pt states she is not in distress.

## 2022-10-03 ENCOUNTER — OFFICE VISIT (OUTPATIENT)
Facility: LOCATION | Age: 72
End: 2022-10-03
Payer: MEDICARE

## 2022-10-03 DIAGNOSIS — B37.0 CANDIDIASIS OF MOUTH: Primary | ICD-10-CM

## 2022-10-03 RX ORDER — CLOTRIMAZOLE 10 MG/1
10 LOZENGE ORAL; TOPICAL
Qty: 50 TROCHE | Refills: 3 | Status: SHIPPED | OUTPATIENT
Start: 2022-10-03

## 2022-10-12 ENCOUNTER — TELEPHONE (OUTPATIENT)
Facility: LOCATION | Age: 72
End: 2022-10-12

## 2022-10-12 NOTE — TELEPHONE ENCOUNTER
Patient was prescribed:\" Mycelex 10 days with refill follow-up after that if not improved\"  She wants to know if it is okay to take since she just got her covid and flu shot.

## 2022-10-24 ENCOUNTER — TELEPHONE (OUTPATIENT)
Dept: FAMILY MEDICINE CLINIC | Facility: CLINIC | Age: 72
End: 2022-10-24

## 2022-10-24 NOTE — TELEPHONE ENCOUNTER
Carter Parker said that patient went for an MRI last week and she got \"spastic\". He said that she is scheduled for an MRI of brain and eye orbits next Friday ordered by a neurologist at [facility]. He is asking if Dr Padmini Arora will order a medication to [facility].

## 2022-10-25 RX ORDER — ALPRAZOLAM 0.5 MG/1
0.5 TABLET ORAL 2 TIMES DAILY PRN
Qty: 6 TABLET | Refills: 0 | Status: SHIPPED | OUTPATIENT
Start: 2022-10-25 | End: 2022-10-31

## 2022-10-25 NOTE — TELEPHONE ENCOUNTER
Given XANAX  Try one at home FIRST, in the daytime, to be sure iit is the effect you are looking for.   Report back

## 2022-10-25 NOTE — TELEPHONE ENCOUNTER
Patient's  advised. Verbalizes understanding. Talked to patient regarding Diazepam allergy. States that it \"didn't agree with her\". She can't remember what happened. Does not think she had a rash or any breathing issues.

## 2022-10-25 NOTE — TELEPHONE ENCOUNTER
She needs a  and you have to tell the tech that she has taken something for relaxation.      And what is her allergy to DIAZEPAM??--says \"other\"

## 2022-10-27 ENCOUNTER — TELEPHONE (OUTPATIENT)
Dept: FAMILY MEDICINE CLINIC | Facility: CLINIC | Age: 72
End: 2022-10-27

## 2022-10-27 RX ORDER — VALBENAZINE 40 MG/1
1 CAPSULE ORAL DAILY
COMMUNITY
Start: 2022-10-27

## 2022-10-27 RX ORDER — DIVALPROEX SODIUM 250 MG/1
250 TABLET, DELAYED RELEASE ORAL DAILY
Qty: 180 TABLET | Refills: 3 | COMMUNITY
Start: 2022-10-27

## 2022-10-27 RX ORDER — RISPERIDONE 0.5 MG/1
0.5 TABLET, FILM COATED ORAL NIGHTLY
Refills: 0 | COMMUNITY
Start: 2022-10-27

## 2022-10-27 NOTE — TELEPHONE ENCOUNTER
PT HAS QUESTIONS REGARDING     ALPRAZolam (XANAX) 0.5 MG Oral Tab    FOR MRI ON 10/28/2022-    PLEASE ADVISE-THANK YOU

## 2022-10-27 NOTE — TELEPHONE ENCOUNTER
Patient advised. GULSHAN she said that the psychiatrists took her off Navane and put her on Depakote 250mg once daily, and Risperidone 0.5mg daily and she is still taking Sertraline once daily. She is going to be taking  Ingrezza 40mg daily.

## 2022-10-27 NOTE — TELEPHONE ENCOUNTER
Thiago for patient to CB. Dr Dee Dee Reza said she she can take the Xanax 1 1/2 hours prior to the MRI, may take another dose 45 minutes later if needed.  SULY.O. Dr Carlton Snowden RN

## 2022-11-04 ENCOUNTER — TELEPHONE (OUTPATIENT)
Dept: FAMILY MEDICINE CLINIC | Facility: CLINIC | Age: 72
End: 2022-11-04

## 2022-11-04 NOTE — TELEPHONE ENCOUNTER
States her psychiatrist, Dr. Vaughn Brunner, started her on 3 new medications on Tuesday. Risperidone, wellbutrin and something else? She contacted her psychiatrist b/c she did not feel well while taking the meds and they were all discontinued (she only took one dose of each). She now reports left-sided weakness and leaning to the left when she is sitting down x 2 days. Denies chest pain, numbness/tingling or facial drooping. Referred to ER for left-sided weakness. Spouse will be taking her to ER.

## 2022-11-08 RX ORDER — ALPRAZOLAM 0.5 MG/1
TABLET ORAL
Qty: 6 TABLET | Refills: 0 | Status: SHIPPED | OUTPATIENT
Start: 2022-11-08

## 2022-11-10 ENCOUNTER — TELEPHONE (OUTPATIENT)
Dept: FAMILY MEDICINE CLINIC | Facility: CLINIC | Age: 72
End: 2022-11-10

## 2022-11-10 NOTE — TELEPHONE ENCOUNTER
Pt. Has F/U scheduled with Dr. Jeniffer Bergman next week but she is asking for referral to endocrinologist before then.

## 2022-11-10 NOTE — TELEPHONE ENCOUNTER
Patient's  said that she had plastic surgery ro lift up her eyelids and she has had multiple tests done. She went into the hospital Sunday because she thought she had a stroke. He said she had to be transferred to [facility] because the CT scan was down. He said the the pituitary gland was prominent on the MRI scan and they told her to see an endocrinologist but they cannot get in until February. He said that she had nothing but problems since having plastic surgery with Dr Libertad Santos last year to have her eyelids lifted.

## 2022-11-29 ENCOUNTER — TELEPHONE (OUTPATIENT)
Dept: FAMILY MEDICINE CLINIC | Facility: CLINIC | Age: 72
End: 2022-11-29

## 2022-11-29 RX ORDER — CARIPRAZINE 3 MG/1
3 CAPSULE, GELATIN COATED ORAL DAILY
Qty: 30 CAPSULE | Refills: 0 | Status: SHIPPED | COMMUNITY
Start: 2022-11-29 | End: 2022-12-29

## 2022-11-29 RX ORDER — VALBENAZINE 40 MG/1
40 CAPSULE ORAL DAILY
Qty: 30 CAPSULE | Refills: 0 | Status: SHIPPED | COMMUNITY
Start: 2022-11-29 | End: 2022-12-29

## 2022-11-29 NOTE — TELEPHONE ENCOUNTER
Metoprolol succinate, toprol, lopressor all the same medication, once a day dosing. I have added new meds.

## 2022-11-29 NOTE — TELEPHONE ENCOUNTER
Patient said that Dr Latrell Waters had prescribed Metoprolol ER 25mg and wanted to know if Dr Varsha Swain changed it to Toprol XL. She does not need it at this time.  She said that she got samples of Ingrezza 40mg daily and Vraylar 3mg daily from the psychiatrist.

## 2022-12-21 ENCOUNTER — TELEPHONE (OUTPATIENT)
Dept: FAMILY MEDICINE CLINIC | Facility: CLINIC | Age: 72
End: 2022-12-21

## 2022-12-21 NOTE — TELEPHONE ENCOUNTER
Estee Wakefield is calling he would like her to be checked out for vertigo by vision not by her ears, please call

## 2022-12-22 NOTE — TELEPHONE ENCOUNTER
Patient said that  is having issues with vertigo and he is wondering if it is her eyes. He said that she saw an ophthalmologist and they told her that her eyes are \"ok\". He said he had one yes that was weaker and causing him to have vertigo. He is wondering if that is what could be happening with Leann.

## 2022-12-29 ENCOUNTER — TELEPHONE (OUTPATIENT)
Dept: FAMILY MEDICINE CLINIC | Facility: CLINIC | Age: 72
End: 2022-12-29

## 2022-12-29 NOTE — TELEPHONE ENCOUNTER
Patient said that she has an enlarged pituitary gland and Dr Rell Ayers is aware of this. She is asking if this could be causing her frequent dizziness. She would like this to be addressed when Dr Rell Ayers returns next week.

## 2023-01-17 ENCOUNTER — OFFICE VISIT (OUTPATIENT)
Dept: NEUROLOGY | Facility: CLINIC | Age: 73
End: 2023-01-17
Payer: MEDICARE

## 2023-01-17 VITALS — HEART RATE: 70 BPM | RESPIRATION RATE: 16 BRPM | DIASTOLIC BLOOD PRESSURE: 78 MMHG | SYSTOLIC BLOOD PRESSURE: 128 MMHG

## 2023-01-17 DIAGNOSIS — G20 PARKINSONISM, UNSPECIFIED PARKINSONISM TYPE (HCC): Primary | ICD-10-CM

## 2023-01-17 PROCEDURE — 99214 OFFICE O/P EST MOD 30 MIN: CPT | Performed by: OTHER

## 2023-01-17 RX ORDER — BUSPIRONE HYDROCHLORIDE 10 MG/1
10 TABLET ORAL 3 TIMES DAILY
COMMUNITY

## 2023-01-17 RX ORDER — VALBENAZINE 40 MG/1
CAPSULE ORAL DAILY
COMMUNITY

## 2023-01-17 NOTE — PROGRESS NOTES
Patient here to establish care regarding speech and gait changes. Former Dr. Yuliet Abreu patient. Still having trouble with balance, speech, drooling. States that she had eye lid surgery approx 2 years ago and has been the start to many of her symptoms.

## 2023-02-07 ENCOUNTER — TELEPHONE (OUTPATIENT)
Dept: FAMILY MEDICINE CLINIC | Facility: CLINIC | Age: 73
End: 2023-02-07

## 2023-02-07 RX ORDER — SULFAMETHOXAZOLE AND TRIMETHOPRIM 800; 160 MG/1; MG/1
1 TABLET ORAL 2 TIMES DAILY
Qty: 20 TABLET | Refills: 0 | Status: SHIPPED | OUTPATIENT
Start: 2023-02-07 | End: 2023-02-17

## 2023-02-07 RX ORDER — SULFAMETHOXAZOLE AND TRIMETHOPRIM 800; 160 MG/1; MG/1
1 TABLET ORAL 2 TIMES DAILY
Qty: 20 TABLET | Refills: 0 | Status: SHIPPED | OUTPATIENT
Start: 2023-02-07 | End: 2023-02-07

## 2023-02-07 NOTE — TELEPHONE ENCOUNTER
Patient said that her  just had cataract surgery yesterday and he cannot drive and she doesn't drive. She said the burning started 2 days ago. She does not have anyone who can bring a urine in to be tested. She is allergic to Keflex and [facility] delivers.

## 2023-02-08 DIAGNOSIS — E78.5 HYPERLIPIDEMIA, UNSPECIFIED HYPERLIPIDEMIA TYPE: ICD-10-CM

## 2023-02-08 RX ORDER — SIMVASTATIN 20 MG
TABLET ORAL
Qty: 90 TABLET | Refills: 1 | Status: SHIPPED | OUTPATIENT
Start: 2023-02-08

## 2023-02-08 NOTE — TELEPHONE ENCOUNTER
Patient advised. Script cancelled at [facility] and sent to [facility].

## 2023-02-08 NOTE — TELEPHONE ENCOUNTER
LOV  11-28-22    LAST LAB 11-6-22 chem profile ALT 11                   8-3-22 lipids                   LAST RX 8-24-22 #90 RF 1    Next OV   Future Appointments   Date Time Provider Chandan Glo   7/18/2023 10:40 AM Yumi Cat MD [facility] [address] [facility] [address]         PROTOCOL  Cholesterol Medication Protocol Passed 02/08/2023 02:07 PM   Protocol Details  ALT < 80    ALT resulted within past year    Lipid panel within past 12 months    Appointment within past 12 or next 3 months

## 2023-02-20 ENCOUNTER — TELEPHONE (OUTPATIENT)
Dept: FAMILY MEDICINE CLINIC | Facility: CLINIC | Age: 73
End: 2023-02-20

## 2023-02-20 NOTE — TELEPHONE ENCOUNTER
Patient was on speaker phone and said that she started with cough and congestion about 4 days ago. She is also c/o wheezing. She denies having a fever. Appointment scheduled with John SANCHEZ at 1130am tomorrow.  and patient advised to go to [facility] today if she has any respiratory distress.

## 2023-02-21 ENCOUNTER — OFFICE VISIT (OUTPATIENT)
Dept: FAMILY MEDICINE CLINIC | Facility: CLINIC | Age: 73
End: 2023-02-21
Payer: MEDICARE

## 2023-02-21 VITALS
DIASTOLIC BLOOD PRESSURE: 50 MMHG | RESPIRATION RATE: 17 BRPM | HEIGHT: 62 IN | BODY MASS INDEX: 30.55 KG/M2 | HEART RATE: 75 BPM | OXYGEN SATURATION: 94 % | SYSTOLIC BLOOD PRESSURE: 130 MMHG | WEIGHT: 166 LBS | TEMPERATURE: 98 F

## 2023-02-21 DIAGNOSIS — J12.9 VIRAL PNEUMONITIS: ICD-10-CM

## 2023-02-21 DIAGNOSIS — J12.9 VIRAL PNEUMONITIS: Primary | ICD-10-CM

## 2023-02-21 PROBLEM — M16.12 PRIMARY OSTEOARTHRITIS OF LEFT HIP: Status: ACTIVE | Noted: 2022-07-21

## 2023-02-21 PROBLEM — R00.2 PALPITATIONS: Status: ACTIVE | Noted: 2021-12-27

## 2023-02-21 PROBLEM — H25.13 AGE-RELATED NUCLEAR CATARACT OF BOTH EYES: Status: ACTIVE | Noted: 2022-10-24

## 2023-02-21 PROBLEM — S42.201D CLOSED FRACTURE OF PROXIMAL END OF RIGHT HUMERUS WITH ROUTINE HEALING: Status: ACTIVE | Noted: 2022-05-23

## 2023-02-21 PROBLEM — M89.8X5 PAIN OF LEFT FEMUR: Status: ACTIVE | Noted: 2022-07-08

## 2023-02-21 PROCEDURE — 99214 OFFICE O/P EST MOD 30 MIN: CPT

## 2023-02-21 RX ORDER — PREDNISONE 10 MG/1
TABLET ORAL
Qty: 27 TABLET | Refills: 0 | Status: SHIPPED | OUTPATIENT
Start: 2023-02-21 | End: 2023-03-02

## 2023-02-21 RX ORDER — ALBUTEROL SULFATE 90 UG/1
2 AEROSOL, METERED RESPIRATORY (INHALATION) EVERY 6 HOURS PRN
Qty: 1 EACH | Refills: 0 | Status: SHIPPED | OUTPATIENT
Start: 2023-02-21

## 2023-02-22 ENCOUNTER — TELEPHONE (OUTPATIENT)
Dept: FAMILY MEDICINE CLINIC | Facility: CLINIC | Age: 73
End: 2023-02-22

## 2023-02-22 RX ORDER — ALBUTEROL SULFATE 90 UG/1
AEROSOL, METERED RESPIRATORY (INHALATION)
Qty: 54 G | Refills: 0 | OUTPATIENT
Start: 2023-02-22

## 2023-02-22 NOTE — TELEPHONE ENCOUNTER
asked if patient is to take all 4 tabs of Prednisone at one time. Advised yes in the AM with food.  Trevin SANCHEZ

## 2023-03-07 ENCOUNTER — TELEPHONE (OUTPATIENT)
Facility: CLINIC | Age: 73
End: 2023-03-07

## 2023-03-07 DIAGNOSIS — R91.8 MULTIPLE PULMONARY NODULES: Primary | ICD-10-CM

## 2023-03-07 NOTE — TELEPHONE ENCOUNTER
Pt called to set up f/u for June. She states that Dr. Karena Kwok has her do a CT every year before her appt. She needs the CT order entered into [facility] so she can schedule.

## 2023-03-07 NOTE — TELEPHONE ENCOUNTER
Last seen by Dr. Karoline Molina on 6-22-22. Advised yearly CT scan. Left message for pt that order was placed.

## 2023-03-09 ENCOUNTER — OFFICE VISIT (OUTPATIENT)
Dept: FAMILY MEDICINE CLINIC | Facility: CLINIC | Age: 73
End: 2023-03-09
Payer: MEDICARE

## 2023-03-09 VITALS
SYSTOLIC BLOOD PRESSURE: 132 MMHG | DIASTOLIC BLOOD PRESSURE: 68 MMHG | HEART RATE: 75 BPM | OXYGEN SATURATION: 97 % | TEMPERATURE: 98 F | RESPIRATION RATE: 16 BRPM

## 2023-03-09 DIAGNOSIS — R10.9 FLANK PAIN: Primary | ICD-10-CM

## 2023-03-09 DIAGNOSIS — R26.81 GAIT INSTABILITY: ICD-10-CM

## 2023-03-09 PROBLEM — I70.0 ATHEROSCLEROSIS OF AORTA: Status: RESOLVED | Noted: 2018-01-25 | Resolved: 2023-03-09

## 2023-03-09 PROBLEM — I70.0 ATHEROSCLEROSIS OF AORTA (HCC): Status: RESOLVED | Noted: 2018-01-25 | Resolved: 2023-03-09

## 2023-03-09 LAB
ALBUMIN SERPL-MCNC: 3.4 G/DL (ref 3.4–5)
ALBUMIN/GLOB SERPL: 1.1 {RATIO} (ref 1–2)
ALP LIVER SERPL-CCNC: 66 U/L
ALT SERPL-CCNC: 18 U/L
ANION GAP SERPL CALC-SCNC: 1 MMOL/L (ref 0–18)
APPEARANCE: CLEAR
AST SERPL-CCNC: 23 U/L (ref 15–37)
BASOPHILS # BLD AUTO: 0.03 X10(3) UL (ref 0–0.2)
BASOPHILS NFR BLD AUTO: 0.3 %
BILIRUB SERPL-MCNC: 0.3 MG/DL (ref 0.1–2)
BILIRUBIN: NEGATIVE
BUN BLD-MCNC: 12 MG/DL (ref 7–18)
CALCIUM BLD-MCNC: 8.7 MG/DL (ref 8.5–10.1)
CHLORIDE SERPL-SCNC: 106 MMOL/L (ref 98–112)
CO2 SERPL-SCNC: 29 MMOL/L (ref 21–32)
CREAT BLD-MCNC: 0.64 MG/DL
EOSINOPHIL # BLD AUTO: 0 X10(3) UL (ref 0–0.7)
EOSINOPHIL NFR BLD AUTO: 0 %
ERYTHROCYTE [DISTWIDTH] IN BLOOD BY AUTOMATED COUNT: 13.1 %
FASTING STATUS PATIENT QL REPORTED: NO
GFR SERPLBLD BASED ON 1.73 SQ M-ARVRAT: 93 ML/MIN/1.73M2 (ref 60–?)
GLOBULIN PLAS-MCNC: 3.1 G/DL (ref 2.8–4.4)
GLUCOSE (URINE DIPSTICK): NEGATIVE MG/DL
GLUCOSE BLD-MCNC: 130 MG/DL (ref 70–99)
HCT VFR BLD AUTO: 38.8 %
HGB BLD-MCNC: 12.9 G/DL
IMM GRANULOCYTES # BLD AUTO: 0.03 X10(3) UL (ref 0–1)
IMM GRANULOCYTES NFR BLD: 0.3 %
KETONES (URINE DIPSTICK): NEGATIVE MG/DL
LYMPHOCYTES # BLD AUTO: 1.21 X10(3) UL (ref 1–4)
LYMPHOCYTES NFR BLD AUTO: 11.7 %
MCH RBC QN AUTO: 30.6 PG (ref 26–34)
MCHC RBC AUTO-ENTMCNC: 33.2 G/DL (ref 31–37)
MCV RBC AUTO: 92.2 FL
MONOCYTES # BLD AUTO: 0.61 X10(3) UL (ref 0.1–1)
MONOCYTES NFR BLD AUTO: 5.9 %
MULTISTIX LOT#: ABNORMAL NUMERIC
NEUTROPHILS # BLD AUTO: 8.42 X10 (3) UL (ref 1.5–7.7)
NEUTROPHILS # BLD AUTO: 8.42 X10(3) UL (ref 1.5–7.7)
NEUTROPHILS NFR BLD AUTO: 81.8 %
NITRITE, URINE: NEGATIVE
OSMOLALITY SERPL CALC.SUM OF ELEC: 284 MOSM/KG (ref 275–295)
PH, URINE: 7 (ref 4.5–8)
PLATELET # BLD AUTO: 152 10(3)UL (ref 150–450)
POTASSIUM SERPL-SCNC: 3.8 MMOL/L (ref 3.5–5.1)
PROT SERPL-MCNC: 6.5 G/DL (ref 6.4–8.2)
PROTEIN (URINE DIPSTICK): NEGATIVE MG/DL
RBC # BLD AUTO: 4.21 X10(6)UL
SODIUM SERPL-SCNC: 136 MMOL/L (ref 136–145)
SPECIFIC GRAVITY: 1.02 (ref 1–1.03)
URINE-COLOR: YELLOW
UROBILINOGEN,SEMI-QN: 0.2 MG/DL (ref 0–1.9)
WBC # BLD AUTO: 10.3 X10(3) UL (ref 4–11)

## 2023-03-09 PROCEDURE — 85025 COMPLETE CBC W/AUTO DIFF WBC: CPT | Performed by: INTERNAL MEDICINE

## 2023-03-09 PROCEDURE — 87086 URINE CULTURE/COLONY COUNT: CPT | Performed by: INTERNAL MEDICINE

## 2023-03-09 PROCEDURE — 80053 COMPREHEN METABOLIC PANEL: CPT | Performed by: INTERNAL MEDICINE

## 2023-03-09 PROCEDURE — 81003 URINALYSIS AUTO W/O SCOPE: CPT | Performed by: INTERNAL MEDICINE

## 2023-03-09 PROCEDURE — 99214 OFFICE O/P EST MOD 30 MIN: CPT | Performed by: INTERNAL MEDICINE

## 2023-03-09 PROCEDURE — 86341 ISLET CELL ANTIBODY: CPT | Performed by: INTERNAL MEDICINE

## 2023-03-10 ENCOUNTER — TELEPHONE (OUTPATIENT)
Dept: FAMILY MEDICINE CLINIC | Facility: CLINIC | Age: 73
End: 2023-03-10

## 2023-03-10 NOTE — TELEPHONE ENCOUNTER
Mariam from [employer] called. She received an order yesterday for a walker. She said they don't do medical equipment. She thinks it may have been sent in error so she is going to discard info.

## 2023-03-10 NOTE — TELEPHONE ENCOUNTER
Spoke with  who states he got a call from [address] [place] [address] stating there will be a dollar and change charge for the delivery of the walker and was asking a bunch of personal information such as name, address and phone number. He wants to know if this is normal? I also got a call from [facility] saying they got a script for the walker in error they believe as they don't do that. Please advise where you sent this order to and if this is normal for a delivery charge.

## 2023-03-10 NOTE — TELEPHONE ENCOUNTER
Pt received a call from [facility]. They stated there was going to be a delivery fee for the walker. He wanted to know if that was normal to receive a bill from [facility].

## 2023-03-10 NOTE — TELEPHONE ENCOUNTER
I don't know. I'll have to ask [facility]. Did Mr Jeremy Nunes ask who he was talking to and get a call back number?

## 2023-03-11 ENCOUNTER — TELEPHONE (OUTPATIENT)
Dept: FAMILY MEDICINE CLINIC | Facility: CLINIC | Age: 73
End: 2023-03-11

## 2023-03-11 NOTE — TELEPHONE ENCOUNTER
Pts spouse called to say that new order was needed for walker, and asking if one was already done and if it was being sent [employer] and if they would be trying to bill for this. Order sent to [facility]..

## 2023-03-14 LAB — GLUTAMIC ACID DECARBOXYLASE AB: <5 IU/ML

## 2023-03-17 ENCOUNTER — TELEPHONE (OUTPATIENT)
Dept: FAMILY MEDICINE CLINIC | Facility: CLINIC | Age: 73
End: 2023-03-17

## 2023-03-17 NOTE — TELEPHONE ENCOUNTER
Spoke with  who states they were informed by [employer] that they are awaiting more paperwork from our office. Tried calling [facility] and was unable to speak to a human. Fax sent to [employer] requesting they fax or call with the information they need. Awaiting response from [employer]. He is aware he may not get a call back until Monday.

## 2023-03-23 ENCOUNTER — TELEPHONE (OUTPATIENT)
Dept: FAMILY MEDICINE CLINIC | Facility: CLINIC | Age: 73
End: 2023-03-23

## 2023-03-23 NOTE — TELEPHONE ENCOUNTER
said that patient has been dizzy for \"quite a few days\". She wants to know if it could be from the subclavian stent she had placed in 2021. She said her blood pressure was 126/58 today. She said she has been taking her Metoprolol and Amlodipine. She said that the dizziness has been getting worse when she \"looks up\". She said she recently had a fall in February and \"hit her head\".

## 2023-03-27 ENCOUNTER — OFFICE VISIT (OUTPATIENT)
Dept: FAMILY MEDICINE CLINIC | Facility: CLINIC | Age: 73
End: 2023-03-27
Payer: MEDICARE

## 2023-03-27 VITALS
SYSTOLIC BLOOD PRESSURE: 128 MMHG | HEART RATE: 66 BPM | DIASTOLIC BLOOD PRESSURE: 70 MMHG | TEMPERATURE: 98 F | RESPIRATION RATE: 16 BRPM | OXYGEN SATURATION: 97 % | WEIGHT: 161.25 LBS | BODY MASS INDEX: 29 KG/M2

## 2023-03-27 DIAGNOSIS — R26.81 GAIT INSTABILITY: Primary | ICD-10-CM

## 2023-03-27 DIAGNOSIS — R42 VERTIGO: ICD-10-CM

## 2023-03-27 PROCEDURE — 99214 OFFICE O/P EST MOD 30 MIN: CPT | Performed by: INTERNAL MEDICINE

## 2023-03-27 RX ORDER — NEOMYCIN SULFATE, POLYMYXIN B SULFATE AND DEXAMETHASONE 3.5; 10000; 1 MG/ML; [USP'U]/ML; MG/ML
SUSPENSION/ DROPS OPHTHALMIC
COMMUNITY
Start: 2023-03-22

## 2023-03-27 RX ORDER — AMOXICILLIN AND CLAVULANATE POTASSIUM 875; 125 MG/1; MG/1
1 TABLET, FILM COATED ORAL 2 TIMES DAILY
Qty: 20 TABLET | Refills: 0 | Status: SHIPPED | OUTPATIENT
Start: 2023-03-27 | End: 2023-04-06

## 2023-03-27 RX ORDER — MECLIZINE HYDROCHLORIDE 25 MG/1
25 TABLET ORAL 3 TIMES DAILY PRN
Qty: 30 TABLET | Refills: 0 | Status: SHIPPED | OUTPATIENT
Start: 2023-03-27 | End: 2023-04-26

## 2023-04-14 ENCOUNTER — TELEPHONE (OUTPATIENT)
Dept: FAMILY MEDICINE CLINIC | Facility: CLINIC | Age: 73
End: 2023-04-14

## 2023-04-14 NOTE — TELEPHONE ENCOUNTER
Message left for [facility] to call back, awaiting response. Patient was previously referred to Dr. Gabriel Hernandez back on 8/25/22 for pain in lower jaw. It doesn't look like she has seen him. Is this the same issue?

## 2023-04-18 ENCOUNTER — TELEPHONE (OUTPATIENT)
Dept: FAMILY MEDICINE CLINIC | Facility: CLINIC | Age: 73
End: 2023-04-18

## 2023-04-18 NOTE — TELEPHONE ENCOUNTER
Nothing to due her sodium is back to normal now (127 in Feb) not 136. Just no alcohol with her psych meds.

## 2023-04-27 ENCOUNTER — OFFICE VISIT (OUTPATIENT)
Dept: FAMILY MEDICINE CLINIC | Facility: CLINIC | Age: 73
End: 2023-04-27
Payer: MEDICARE

## 2023-04-27 VITALS
HEART RATE: 96 BPM | RESPIRATION RATE: 16 BRPM | BODY MASS INDEX: 28 KG/M2 | WEIGHT: 153.13 LBS | TEMPERATURE: 99 F | OXYGEN SATURATION: 97 % | DIASTOLIC BLOOD PRESSURE: 74 MMHG | SYSTOLIC BLOOD PRESSURE: 132 MMHG

## 2023-04-27 DIAGNOSIS — R26.81 GAIT INSTABILITY: Primary | ICD-10-CM

## 2023-04-27 DIAGNOSIS — F31.11 BIPOLAR AFFECTIVE DISORDER, CURRENTLY MANIC, MILD (HCC): ICD-10-CM

## 2023-04-27 DIAGNOSIS — M25.562 LEFT KNEE PAIN, UNSPECIFIED CHRONICITY: ICD-10-CM

## 2023-04-27 DIAGNOSIS — G24.01 DYSKINESIA, TARDIVE: ICD-10-CM

## 2023-04-27 PROCEDURE — 99214 OFFICE O/P EST MOD 30 MIN: CPT | Performed by: INTERNAL MEDICINE

## 2023-04-27 RX ORDER — THIOTHIXENE 2 MG/1
2 CAPSULE ORAL 2 TIMES DAILY
COMMUNITY
Start: 2023-04-07

## 2023-04-27 RX ORDER — LORAZEPAM 0.5 MG/1
0.5 TABLET ORAL 2 TIMES DAILY
COMMUNITY
Start: 2023-04-05

## 2023-04-27 RX ORDER — FLUTICASONE PROPIONATE 50 MCG
2 SPRAY, SUSPENSION (ML) NASAL DAILY
Qty: 1 EACH | Refills: 2 | Status: SHIPPED | OUTPATIENT
Start: 2023-04-27 | End: 2023-05-27

## 2023-04-27 RX ORDER — PAROXETINE 10 MG/1
10 TABLET, FILM COATED ORAL DAILY
COMMUNITY
Start: 2023-03-27

## 2023-04-27 RX ORDER — CYCLOSPORINE 0.5 MG/ML
1 EMULSION OPHTHALMIC 2 TIMES DAILY
COMMUNITY
Start: 2023-04-25

## 2023-04-28 PROBLEM — G24.01 DYSKINESIA, TARDIVE: Status: ACTIVE | Noted: 2023-04-28

## 2023-05-01 ENCOUNTER — TELEPHONE (OUTPATIENT)
Dept: FAMILY MEDICINE CLINIC | Facility: CLINIC | Age: 73
End: 2023-05-01

## 2023-05-01 NOTE — TELEPHONE ENCOUNTER
Patient said that she is \"drooling again really bad\". She said that she finished the antibiotics a couple weeks ago. She said that her chest is very congested and she has neck pain. She said she is SOB and wheezing. She said she does not us an inhalers. She denies having a fever. She has been taking Flonase since Friday but it has not made a difference.

## 2023-05-03 ENCOUNTER — OFFICE VISIT (OUTPATIENT)
Dept: FAMILY MEDICINE CLINIC | Facility: CLINIC | Age: 73
End: 2023-05-03
Payer: MEDICARE

## 2023-05-03 VITALS
OXYGEN SATURATION: 97 % | WEIGHT: 151 LBS | BODY MASS INDEX: 28 KG/M2 | DIASTOLIC BLOOD PRESSURE: 70 MMHG | SYSTOLIC BLOOD PRESSURE: 117 MMHG | HEART RATE: 69 BPM | TEMPERATURE: 99 F | RESPIRATION RATE: 16 BRPM

## 2023-05-03 DIAGNOSIS — J01.00 SUBACUTE MAXILLARY SINUSITIS: ICD-10-CM

## 2023-05-03 DIAGNOSIS — R30.0 DYSURIA: Primary | ICD-10-CM

## 2023-05-03 PROCEDURE — 99214 OFFICE O/P EST MOD 30 MIN: CPT | Performed by: INTERNAL MEDICINE

## 2023-05-03 PROCEDURE — 87086 URINE CULTURE/COLONY COUNT: CPT | Performed by: INTERNAL MEDICINE

## 2023-05-03 RX ORDER — DOXYCYCLINE HYCLATE 100 MG
100 TABLET ORAL 2 TIMES DAILY
Qty: 20 TABLET | Refills: 0 | Status: SHIPPED | OUTPATIENT
Start: 2023-05-03 | End: 2023-05-13

## 2023-05-16 ENCOUNTER — TELEPHONE (OUTPATIENT)
Dept: FAMILY MEDICINE CLINIC | Facility: CLINIC | Age: 73
End: 2023-05-16

## 2023-05-17 ENCOUNTER — TELEPHONE (OUTPATIENT)
Dept: FAMILY MEDICINE CLINIC | Facility: CLINIC | Age: 73
End: 2023-05-17

## 2023-05-17 NOTE — TELEPHONE ENCOUNTER
said that patient is currently in the ER. He does not know if she is going to be admitted. Advised to let us know if she gets admitted and we will cancel her appointment t with Hilton SANCHEZ tomorrow.

## 2023-05-17 NOTE — TELEPHONE ENCOUNTER
called stating Wilfrido De Jesus is at [facility]. He wanted Dr. Tawny Rodriges to be aware.

## 2023-05-17 NOTE — TELEPHONE ENCOUNTER
states that patient is having increased SOB and difficulty swallowing. She finished the antibiotics and is using the Albuterol, once daily. Advised to increase the inhaler to 2 puffs every 6 hours as needed. Appointment scheduled with Rafael SANCHEZ tomorrow at [address] to go to the ER if increased SOB or distress.  NAVEED Guerrero RN

## 2023-05-18 NOTE — TELEPHONE ENCOUNTER
called and states that patient got admitted to [facility].

## 2023-06-08 ENCOUNTER — TELEPHONE (OUTPATIENT)
Dept: FAMILY MEDICINE CLINIC | Facility: CLINIC | Age: 73
End: 2023-06-08

## 2023-06-08 NOTE — TELEPHONE ENCOUNTER
Pt is at [facility].  She will hopefully be released June 16th. She will need lift chair.

## 2023-06-12 ENCOUNTER — TELEPHONE (OUTPATIENT)
Dept: FAMILY MEDICINE CLINIC | Facility: CLINIC | Age: 73
End: 2023-06-12

## 2023-06-12 NOTE — TELEPHONE ENCOUNTER
said that patient is coming home 6/16/23. She has a feeding tube in her \"stomache\" but can eat pureed food. He said that they are going to have help coming into the house. He said that patient can walk with a walker. He said that he is \"pretty bad\" and is having difficulty walking. She is at [facility] in [place]. They do not have any family in the immediate area. He said the chir lift is to help her get out of the chair.

## 2023-06-12 NOTE — TELEPHONE ENCOUNTER
said that patient is scheduled to be released from the hospital until 6/16/23. He is trying to get the bathroom redone so she may have to stay at her son's house a few days. He wants to know if Dr Jeniffer Bergman can order a left chair.

## 2023-06-21 ENCOUNTER — TELEPHONE (OUTPATIENT)
Dept: FAMILY MEDICINE CLINIC | Facility: CLINIC | Age: 73
End: 2023-06-21

## 2023-06-21 RX ORDER — OMEPRAZOLE 40 MG/1
40 CAPSULE, DELAYED RELEASE ORAL DAILY
Qty: 30 CAPSULE | Refills: 0 | Status: SHIPPED | OUTPATIENT
Start: 2023-06-21 | End: 2023-07-21

## 2023-06-21 NOTE — TELEPHONE ENCOUNTER
[address] WANTS TO SEE ANALIA BUT SHE IS CURRENTLY AT HER SON'S HOUSE OUT OF TOWN AND WILL RETURN AROUND July. HE IS ASKING TO SPEAK WITH THE NURSE.

## 2023-06-21 NOTE — TELEPHONE ENCOUNTER
Spoke with  - pt is staying at her son's house in [place], [place] and wont be home until early July. Follow-up appt set for Dr. Yamile Curtis on 7.6.23   would like short-term supply of ergocalceriol and omeprazole prescriptions sent to [facility] in [place] until pt gets home. Original prescriptions were sent to [facility]. Prescriptions for 30 days sent.

## 2023-06-22 ENCOUNTER — TELEPHONE (OUTPATIENT)
Dept: FAMILY MEDICINE CLINIC | Facility: CLINIC | Age: 73
End: 2023-06-22

## 2023-06-22 NOTE — TELEPHONE ENCOUNTER
Spoke with  - had spoken with pharmacy - having an issue with prescriptions in [place]. Contacted [facility] in [place]. Unable to fill Cholecalciferol  - medication is over the counter. Discussed with  - explained medication is over the counter. Reviewed dosing.  verbalized understanding.

## 2023-06-22 NOTE — TELEPHONE ENCOUNTER
PTS.  CALLING STATING THE [facility] IN [place] TELLING HIM THEY DIDN'T RECEIVE ANY SCRIPTS FROM OUR OFFICE.

## 2023-06-27 ENCOUNTER — TELEPHONE (OUTPATIENT)
Dept: FAMILY MEDICINE CLINIC | Facility: CLINIC | Age: 73
End: 2023-06-27

## 2023-06-27 ENCOUNTER — HOME HEALTH CHARGES (OUTPATIENT)
Dept: FAMILY MEDICINE CLINIC | Facility: CLINIC | Age: 73
End: 2023-06-27

## 2023-06-27 DIAGNOSIS — R13.10 DYSPHAGIA, UNSPECIFIED TYPE: Primary | ICD-10-CM

## 2023-06-27 RX ORDER — BISACODYL 10 MG
10 SUPPOSITORY, RECTAL RECTAL DAILY
COMMUNITY

## 2023-06-27 RX ORDER — MAGNESIUM HYDROXIDE/ALUMINUM HYDROXICE/SIMETHICONE 120; 1200; 1200 MG/30ML; MG/30ML; MG/30ML
30 SUSPENSION ORAL 4 TIMES DAILY PRN
COMMUNITY

## 2023-06-27 RX ORDER — PANTOPRAZOLE SODIUM 40 MG/1
40 FOR SUSPENSION ORAL
COMMUNITY

## 2023-06-27 RX ORDER — ACETAMINOPHEN 325 MG/1
650 TABLET ORAL EVERY 4 HOURS PRN
COMMUNITY

## 2023-06-27 RX ORDER — FLUTICASONE PROPIONATE 50 MCG
2 SPRAY, SUSPENSION (ML) NASAL DAILY
COMMUNITY

## 2023-06-27 RX ORDER — THIOTHIXENE 1 MG/1
1 CAPSULE ORAL 2 TIMES DAILY
COMMUNITY

## 2023-06-27 RX ORDER — ENOXAPARIN SODIUM 100 MG/ML
40 INJECTION SUBCUTANEOUS DAILY
COMMUNITY

## 2023-06-27 RX ORDER — CLOBETASOL PROPIONATE 0.5 MG/G
1 CREAM TOPICAL DAILY
COMMUNITY

## 2023-06-28 ENCOUNTER — TELEPHONE (OUTPATIENT)
Dept: FAMILY MEDICINE CLINIC | Facility: CLINIC | Age: 73
End: 2023-06-28

## 2023-07-06 ENCOUNTER — TELEPHONE (OUTPATIENT)
Dept: FAMILY MEDICINE CLINIC | Facility: CLINIC | Age: 73
End: 2023-07-06

## 2023-07-06 NOTE — TELEPHONE ENCOUNTER
advised. She said she has an appointment 7/16/23 with the neurologist. Zahida Borges can do a home visit Monday.  He asked what time, she has a phone appointment with the specialist at 130pm.

## 2023-07-06 NOTE — TELEPHONE ENCOUNTER
Rehab records read she needs follow up with [facility] Neurology Dr Natalia Ponce for VOG/VHIT (video-oculography and video head impulse test)  And  movement disorder neurologist Dr Rip Covarrubias: requires 5 cans of Nutren 1.5 (1200ml) bolus feedings flush 30 oz (900cc) of free water daily  NPO. Meds go through G tube.

## 2023-07-06 NOTE — TELEPHONE ENCOUNTER
CHRISTY for [facility] Coordinator to CB 215pm. Any suggestions in the meantime?

## 2023-07-06 NOTE — TELEPHONE ENCOUNTER
said that the patient came home Sunday and they have home health for OT and PT but they do not come everyday. He said that they came yesterday for him and and are supposed to come tomorrow. He said that patient's daughter is working on getting home health for her through the same [facility]. He said that their care coordinator through the [facility] is Rupert Gautam [phone]. She stayed at her son's house when she was discharged from rehab. Richelle Starr said that it has been [place] [facility]\" and he doesn't know anything about when or who she is supposed to follow up with. He said he does know they told her to have a swallow study in 3 weeks.

## 2023-07-07 ENCOUNTER — TELEPHONE (OUTPATIENT)
Dept: FAMILY MEDICINE CLINIC | Facility: CLINIC | Age: 73
End: 2023-07-07

## 2023-07-07 NOTE — TELEPHONE ENCOUNTER
called stating that Thanh Schultz and Dr. Abraham Carl needs his wife's daughter [facility] phone number. It is [phone].  Will forward to Dr. Abraham Carl

## 2023-07-07 NOTE — TELEPHONE ENCOUNTER
PLEASE CALL SPOUSE TO R/S HOME VISIT-HE HAD TO CANCEL DUE TO ANOTHER DR VISIT AT THAT TIME-    THANK YOU

## 2023-07-07 NOTE — TELEPHONE ENCOUNTER
RETURNING CALL-PLEASE CALL REGARDING HOME CARE FOR PT AND -    WILL BE IN THE OFFICE FOR ANOTHER 1/2HR    Sonal 64 YOU

## 2023-07-07 NOTE — TELEPHONE ENCOUNTER
called and states patient has a doctor's appointment on Monday at 1:15pm and they will need to reschedule Dr. Gladys Lindsey coming to the home. He would like Krystian Nicely to call back on Monday.

## 2023-07-10 NOTE — TELEPHONE ENCOUNTER
cancelled patient's appointment today because they had a conflict. Home visit scheduled Thursday 7/13/23. He said that they need help with house work. [facility] is going to their house Wed to evaluate their needs.

## 2023-07-13 ENCOUNTER — OFFICE VISIT (OUTPATIENT)
Dept: FAMILY MEDICINE CLINIC | Facility: CLINIC | Age: 73
End: 2023-07-13
Payer: MEDICARE

## 2023-07-13 DIAGNOSIS — A52.11: Primary | ICD-10-CM

## 2023-07-13 PROCEDURE — 99214 OFFICE O/P EST MOD 30 MIN: CPT | Performed by: INTERNAL MEDICINE

## 2023-07-13 PROCEDURE — 99998 NO SHOW: CPT | Performed by: INTERNAL MEDICINE

## 2023-07-17 ENCOUNTER — PATIENT OUTREACH (OUTPATIENT)
Dept: CASE MANAGEMENT | Age: 73
End: 2023-07-17

## 2023-07-17 ENCOUNTER — TELEPHONE (OUTPATIENT)
Dept: FAMILY MEDICINE CLINIC | Facility: CLINIC | Age: 73
End: 2023-07-17

## 2023-07-17 PROBLEM — G31.9 NEURODEGENERATIVE DISORDER (HCC): Status: ACTIVE | Noted: 2023-07-17

## 2023-07-17 PROBLEM — G31.9 NEURODEGENERATIVE DISORDER: Status: ACTIVE | Noted: 2023-07-17

## 2023-07-17 NOTE — PROGRESS NOTES
Filiberto Martins is a 74year old female. HPI:   Home visit. Pt has returned back home after hospitalization, rehab and stay at her son's house. In May she presented to the ER with SOB, cough, diff swallowing and treated for aspiration pneumonia. Previously she was evaluated fro progressive speech problems, diff swallowing, and TD from a lifetime use of Navane. She was transferred to [facility] 5/19/2023 and underwent placement of a GT. She is able to take PO and she is currently on a stage 3 dysphagia diet, soft foods, and well cooked vegetables. She is not able to climb stairs, she is using a lift chair and walker full time for ambulation. She needs help with dressing, cooking, cleaning, and bathing. Her  is equally impaired and undergoing radiation treatment for lung cancer. Her has been losing weight and finds if exhausting to take care of her. She has family involvement, but they are not local.  She needs to schedule follow up with neurology at [facility] and  cannot drive her that far. Current Outpatient Medications   Medication Sig Dispense Refill    Polyethyl Glycol-Propyl Glycol (SYSTANE PRESERVATIVE FREE OP) Apply 1 drop to eye 5 (five) times daily. metoprolol tartrate 25 MG Oral Tab 0.5 tablets (12.5 mg total) by Per G Tube route 2 (two) times daily. clobetasol 0.05 % External Cream Apply 1 Application topically daily. enoxaparin 40 MG/0.4ML Injection Solution Prefilled Syringe Inject 0.4 mL (40 mg total) into the skin daily. fluticasone propionate 50 MCG/ACT Nasal Suspension 2 sprays by Each Nare route daily. pantoprazole 40 MG Oral Tab EC Take 40 mg by mouth every morning before breakfast. Granule packet per G-tube daily      thioTHIXene 1 MG Oral Cap Take 1 capsule (1 mg total) by mouth 2 (two) times daily. acetaminophen 325 MG Oral Tab Take 2 tablets (650 mg total) by mouth every 4 (four) hours as needed for Pain.       alum-mag hydroxide-simethicone 100-585-79 MG/5ML Oral Suspension Take 30 mL by mouth 4 (four) times daily as needed for Indigestion. bisacodyl 10 MG Rectal Suppos Place 1 suppository (10 mg total) rectally daily. Cholecalciferol 25 MCG (1000 UT) Oral Tab Take 2 tablets (2,000 Units total) by mouth daily. 60 tablet 0    Omeprazole 40 MG Oral Capsule Delayed Release Take 1 capsule (40 mg total) by mouth daily. (Patient not taking: Reported on 6/27/2023) 30 capsule 0    cycloSPORINE (RESTASIS) 0.05 % Ophthalmic Emulsion Place 1 drop into both eyes 2 (two) times daily. (Patient not taking: Reported on 6/27/2023)      LORazepam 0.5 MG Oral Tab Take 1 tablet (0.5 mg total) by mouth 2 (two) times daily. (Patient not taking: Reported on 6/27/2023)      PARoxetine 10 MG Oral Tab Take 1 tablet (10 mg total) by mouth daily. (Patient not taking: Reported on 6/27/2023)      thioTHIXene 2 MG Oral Cap Take 1 capsule (2 mg total) by mouth 2 (two) times daily. (Patient not taking: Reported on 6/27/2023)      Neomycin-Polymyxin-Dexameth 3.5-02102-6.1 Ophthalmic Suspension SHAKE LIQUID AND INSTILL 1 DROP IN [facility] EYE THREE TIMES DAILY (Patient not taking: Reported on 6/27/2023)      iloperidone 6 MG Oral Tab Take 0.5 tablets (3 mg total) by mouth daily. (Patient not taking: Reported on 6/27/2023) 60 tablet 0    SIMVASTATIN 20 MG Oral Tab TAKE 1 TABLET EVERY NIGHT 90 tablet 1    Valbenazine Tosylate (INGREZZA) 40 MG Oral Cap Take by mouth daily. (Patient not taking: Reported on 6/27/2023)      busPIRone 10 MG Oral Tab Take 1 tablet (10 mg total) by mouth 3 (three) times daily. (Patient not taking: Reported on 6/27/2023)      metoprolol succinate ER 25 MG Oral Tablet 24 Hr Take 1 tablet (25 mg total) by mouth once daily. (Patient not taking: Reported on 6/27/2023) 90 tablet 3    clopidogrel 75 MG Oral Tab Take 1 tablet (75 mg total) by mouth daily. 90 tablet 3    amLODIPine 10 MG Oral Tab Take 1 tablet (10 mg total) by mouth daily.  [address] tablet 3    Multiple Vitamins-Minerals (CENTRUM SILVER ULTRA WOMENS) Oral Tab Take 1 tablet by mouth daily. (Patient not taking: Reported on 2023)      Vitamin D3 (VITAMIN D3) 2000 UNITS Oral Cap Take by mouth daily. (Patient not taking: Reported on 2023)      Omega-3 Fatty Acids (FISH OIL) 1200 MG Oral Cap Take by mouth 2 (two) times daily. (Patient not taking: Reported on 2023)      aspirin 81 MG Oral Chew Tab Chew 1 tablet (81 mg total) by mouth daily. Cyanocobalamin (B-12) 500 MCG Oral Tab Take 1,000 Units by mouth daily.           Past Medical History:   Diagnosis Date    Cataract     starting    Closed fracture of transverse process of lumbar vertebra with routine healing 2017    Contact dermatitis and other eczema, due to unspecified cause 2000    Depression     Difficult intubation     pt says she has been told to say she has a small mouth/throat    Diverticulosis     GERD (gastroesophageal reflux disease)     EGD 2011    Hearing impairment     otolodosclerosis both ears; implant right ear    Hemorrhoids     Herpes zoster 2015    History of lumbar discectomy 2014    History of right-sided carotid endarterectomy 2020    HTN (hypertension)     Hyperlipidemia     Impaired fasting glucose     Irritable bowel     OAB (overactive bladder) 2015    Osteoarthritis of left hip 2014    Osteoarthritis of lumbar spine     Psoriasis     Subclavian arterial stenosis ([facility] [address] 2021    Tardive dyskinesia     Vitamin D deficiency 2012      Social History:  Social History     Socioeconomic History    Marital status:     Number of children: 2   Occupational History    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   Tobacco Use    Smoking status: Former     Packs/day: 0.00     Years: 30.00     Pack years: 0.00     Types: Cigarettes     Quit date: 2016     Years since quittin.2    Smokeless tobacco: Never   Vaping Use    Vaping Use: Never used   Substance and Sexual Activity    Alcohol use: Yes     Comment: beer sometimes    Drug use: No    Sexual activity: Yes     Partners: Male   Other Topics Concern    Caffeine Concern No     Comment: 2 cups coffee in AM    Exercise No     Comment: 2x per week        REVIEW OF SYSTEMS:   GENERAL HEALTH: feels well otherwise  SKIN: denies any unusual skin lesions or rashes  RESPIRATORY: denies shortness of breath with exertion  CARDIOVASCULAR: denies chest pain on exertion  GI: denies abdominal pain and denies heartburn  NEURO: denies headaches    EXAM:   There were no vitals taken for this visit. GENERAL: well developed, well nourished,in no apparent distress  SKIN: no rashes,no suspicious lesions  HEENT: atraumatic, normocephalic,ears and throat are clear  NECK: supple,no adenopathy,no bruits  LUNGS: clear to auscultation  CARDIO: RRR without murmur  GI: good BS's,no masses, HSM or tenderness  EXTREMITIES: no cyanosis, clubbing or edema    ASSESSMENT AND PLAN:   Progressive spinal ataxia  (primary encounter diagnosis)  Pt has many impairments to ADLs. She needs additional help at home and expected to need more as time goes on. She is still in denial of her diagnosis, but will follow through with follow up with her daughters assistance. No orders of the defined types were placed in this encounter. Meds & Refills for this Visit:  Requested Prescriptions      No prescriptions requested or ordered in this encounter       Imaging & Consults:  None    Follow up as needed. The patient indicates understanding of these issues and agrees to the plan.

## 2023-07-17 NOTE — PROGRESS NOTES
Patient identified with a potential need for Chronic Care Management services. Called patient to introduce self and availability of Chronic Care Management services. Patient informed about the following service elements:  Health information sharing - complying with all laws related to privacy and security of their health information  Patient/Insurance Cost sharing - includes deductible and any ongoing copays and/or coinsurance  Only one provider can bill for this service monthly  Patient right to discontinue services at any time for any reason effective last day of current month  Patient verbally accepts to participate in Chronic Care Management services and any associated charges. Pt requested CCM information with my contact information be mailed to her, address was verified with Pt.

## 2023-07-18 ENCOUNTER — PATIENT OUTREACH (OUTPATIENT)
Dept: CASE MANAGEMENT | Age: 73
End: 2023-07-18

## 2023-07-18 NOTE — PROGRESS NOTES
07/18/23      [facility] assessment call, Pt requested a call back after noon today, forgot that she had PT today at [address] [facility], [facility], [facility] Management Dept. Miguel 73 3rd Floor  [address]   Office [phone]  [facility]. [facility]

## 2023-07-18 NOTE — PROGRESS NOTES
07/18/23      CCM assessment call, no answer LM to CB at [phone]. [address] Manager/Health   [address] [place] [address] Management Dept. Miguel 73 R[address] Floor  [address] [place] [address]   Office [phone]  [address] org

## 2023-07-24 NOTE — TELEPHONE ENCOUNTER
LM for patient that the forms are still here and we need the names of the two specialists if he has them

## 2023-07-26 ENCOUNTER — TELEPHONE (OUTPATIENT)
Dept: FAMILY MEDICINE CLINIC | Facility: CLINIC | Age: 73
End: 2023-07-26

## 2023-07-26 DIAGNOSIS — R13.10 DYSPHAGIA, UNSPECIFIED TYPE: Primary | ICD-10-CM

## 2023-07-26 NOTE — TELEPHONE ENCOUNTER
said that patient will need g-tube removed and  a swallow study. The specialist is Dr Zelalem Hu with [facility] [phone].

## 2023-07-27 NOTE — TELEPHONE ENCOUNTER
The representative from Dr Jazzmine Kellogg office states that she was seen in the hospital and do not have any information on her. The [employer] lab states patient is not scheduled for a swallow study.

## 2023-07-28 ENCOUNTER — TELEPHONE (OUTPATIENT)
Dept: FAMILY MEDICINE CLINIC | Facility: CLINIC | Age: 73
End: 2023-07-28

## 2023-07-28 DIAGNOSIS — R13.10 DYSPHAGIA, UNSPECIFIED TYPE: Primary | ICD-10-CM

## 2023-07-28 NOTE — TELEPHONE ENCOUNTER
Spoke with  who states VW did received a swallow eval. Is this correct? He wants to just get the swallow study done. I'm not sure about the process. Is there a consult first and then the study, or can she just get the study right away? He would like a call back on Saturday. Also, he wanted Dr. Calderon Bergeron to know that they did schedule patient with Dr. Carla Mark on 9/26/23. This is the earliest they can get her in. If there is a cancellation, they will let them know.

## 2023-07-28 NOTE — TELEPHONE ENCOUNTER
wanted to discuss swallow test that was sent to [facility].  He said they didn't receive it.

## 2023-07-31 NOTE — TELEPHONE ENCOUNTER
Per [facility] scheduling the order needs to say Fluroscopy Swallow study.  Order changed and faxed to [facility] central scheduling

## 2023-07-31 NOTE — TELEPHONE ENCOUNTER
advised, order faxed to [facility] and [facility] central scheduling. He said he will bring the name of the doctors over. He also said he received a bill for a no show fee on 7/13/23. Dr Haydee Zuniga did a home visit that day. Will send message to lead PSR.

## 2023-07-31 NOTE — TELEPHONE ENCOUNTER
----- Message from Katina Gotti sent at 7/31/2023 11:20 AM CDT -----  Regarding: RETURN CALL   RETURNING PHONE [place], [phone].

## 2023-08-01 ENCOUNTER — TELEPHONE (OUTPATIENT)
Dept: FAMILY MEDICINE CLINIC | Facility: CLINIC | Age: 73
End: 2023-08-01

## 2023-08-01 NOTE — TELEPHONE ENCOUNTER
Patient said that [address] figure out the codes for the swallow study. Advised we will contact central scheduling tomorrow. He states that they did not get the [place] and lift from [facility]. They got it from [facility] in [place].

## 2023-08-02 ENCOUNTER — PATIENT OUTREACH (OUTPATIENT)
Dept: CASE MANAGEMENT | Age: 73
End: 2023-08-02

## 2023-08-02 NOTE — PROGRESS NOTES
08/02/23      CCM assessment call, no answer LM to CB at [phone]. [address] Manager/Health   [address] [place] [address] Management Dept. Miguel 73 [facility] Floor  [address] [place] [address]   Office [phone]  [address] org

## 2023-08-02 NOTE — TELEPHONE ENCOUNTER
M        Patient said that [place] cannot figure out the codes for the swallow study. Advised we will contact central scheduling tomorrow. He states that they did not get the chair and lift from [place]. They got it from [facility] in [place].

## 2023-08-02 NOTE — TELEPHONE ENCOUNTER
[facility] central scheduling states that patient is scheduled for the swallow study the beginning of September. LM for  that we can correct the paperwork to reflect that they got the chair at [facility] in [place] if he brings it in.

## 2023-08-03 ENCOUNTER — PATIENT OUTREACH (OUTPATIENT)
Dept: CASE MANAGEMENT | Age: 73
End: 2023-08-03

## 2023-08-07 ENCOUNTER — TELEPHONE (OUTPATIENT)
Dept: FAMILY MEDICINE CLINIC | Facility: CLINIC | Age: 73
End: 2023-08-07

## 2023-08-07 NOTE — TELEPHONE ENCOUNTER
Patient  called and would like to speak with Primus Daley about lift chair orders, he said it can wait until Primus Thuy returns on 8/9/23

## 2023-08-15 ENCOUNTER — PATIENT OUTREACH (OUTPATIENT)
Dept: CASE MANAGEMENT | Age: 73
End: 2023-08-15

## 2023-08-17 NOTE — PROGRESS NOTES
08/15/23      CCM assessment call, no answer LM to CB at [phone]. [address] Manager/Health   [address] [place] [address] Management Dept. Miguel [address] [facility] Floor  [address] [place] [address]   Office [phone]  [address] org

## 2023-08-23 ENCOUNTER — TELEPHONE (OUTPATIENT)
Dept: FAMILY MEDICINE CLINIC | Facility: CLINIC | Age: 73
End: 2023-08-23

## 2023-08-23 DIAGNOSIS — R13.10 DYSPHAGIA, UNSPECIFIED TYPE: Primary | ICD-10-CM

## 2023-08-23 DIAGNOSIS — G24.01 DYSKINESIA, TARDIVE: ICD-10-CM

## 2023-08-24 NOTE — TELEPHONE ENCOUNTER
said that he took patient in for therapy yesterday and was told that Dr Rickie Monteiro ordered swallow therapy but she is \"all done\" with that garbage.  He said that they are upset because she needs speech therapy not swallow therapy because she \"passed the swallow test.

## 2023-09-01 ENCOUNTER — PATIENT OUTREACH (OUTPATIENT)
Dept: CASE MANAGEMENT | Age: 73
End: 2023-09-01

## 2023-09-05 NOTE — PROGRESS NOTES
09/01/23      CCM assessment call, no answer LM to CB at [phone]. [address] Manager/Health   [address] [place] [address] Management Dept. Miguel 73 [facility] Floor  [address] [place] [address]   Office [phone]  [address] org

## 2023-09-06 ENCOUNTER — TELEPHONE (OUTPATIENT)
Dept: FAMILY MEDICINE CLINIC | Facility: CLINIC | Age: 73
End: 2023-09-06

## 2023-09-06 NOTE — TELEPHONE ENCOUNTER
Per Dr Deniz Brown office patient has an appointment with Dr Kelli Almaguer on 9/26/23 at 3pm. The nurse will call us back to let us know if she needs to have a swallow study prior to the appointment.

## 2023-09-07 ENCOUNTER — TELEPHONE (OUTPATIENT)
Dept: FAMILY MEDICINE CLINIC | Facility: CLINIC | Age: 73
End: 2023-09-07

## 2023-09-07 NOTE — TELEPHONE ENCOUNTER
Bianca Guillory from Dr Boris Castle office states that patient should not have a swallow study prior to the appointment on 9/26/23.

## 2023-09-09 ENCOUNTER — TELEPHONE (OUTPATIENT)
Dept: FAMILY MEDICINE CLINIC | Facility: CLINIC | Age: 73
End: 2023-09-09

## 2023-09-09 NOTE — TELEPHONE ENCOUNTER
Daughter Lila Dobbs said that patient is very upset because she still has the G-tube in and there is nobody to flush it everyday. She said that patient did not see a gastroenterologist at [facility]. The G-tube was placed by the radiologist. She said that Dr Caron Gautam oversaw her care at [facility] and if the PCP places the order they can remove it. She said that they are upset the repeat swallow study was canceled. Patient's  may be in the hospital for 1 week and Medicare will not pay for home health nursing for LITO/Marv Soniaania Final. She said they have called [facility] in [place] and [place]  but there is no staff to help patient.

## 2023-09-09 NOTE — TELEPHONE ENCOUNTER
Patient states that she can get her G-tube out at [facility] if Dr Dee Dee Reza sends the order over there. Patient advised this needs to be ordered by the gastroenterologist. Does Dr Dee Dee Reza need to place a referral to the gastroenterologist? Patient said her daughter Chelsea Villalpando has all the information and gave us permission to speak with her.  LM for daughter Chelsea Villalpando to  930am.

## 2023-09-14 ENCOUNTER — TELEPHONE (OUTPATIENT)
Dept: FAMILY MEDICINE CLINIC | Facility: CLINIC | Age: 73
End: 2023-09-14

## 2023-09-14 DIAGNOSIS — A52.11: ICD-10-CM

## 2023-09-14 DIAGNOSIS — G24.01 DYSKINESIA, TARDIVE: ICD-10-CM

## 2023-09-14 DIAGNOSIS — R26.81 GAIT INSTABILITY: ICD-10-CM

## 2023-09-14 DIAGNOSIS — R13.10 DYSPHAGIA, UNSPECIFIED TYPE: Primary | ICD-10-CM

## 2023-09-20 DIAGNOSIS — E78.5 HYPERLIPIDEMIA, UNSPECIFIED HYPERLIPIDEMIA TYPE: ICD-10-CM

## 2023-09-20 RX ORDER — SIMVASTATIN 20 MG
20 TABLET ORAL NIGHTLY
Qty: 90 TABLET | Refills: 0 | Status: SHIPPED | OUTPATIENT
Start: 2023-09-20

## 2023-09-20 RX ORDER — AMLODIPINE BESYLATE 10 MG/1
10 TABLET ORAL DAILY
Qty: 90 TABLET | Refills: 0 | Status: SHIPPED | OUTPATIENT
Start: 2023-09-20

## 2023-09-20 NOTE — TELEPHONE ENCOUNTER
Hypertension Medications Protocol Qdznsn2209/20/2023 10:16 AM   Protocol Details CMP or BMP in past 12 months    Last serum creatinine< 2.0    Appointment in past 6 or next 3 months          Cholesterol Medication Protocol Sfazwa5709/20/2023 10:16 AM   Protocol Details Lipid panel within past 12 months    ALT < 80    ALT resulted within past year    Appointment within past 12 or next 3 months          Last office visit:  07/13/23  Last lipid:  08/03/22  Last cmp/bmp:  05/26/23  BP Readings from Last 3 Encounters:  05/03/23 : 117/70  04/27/23 : 132/74  03/27/23 : 128/70    Amlodipine:  11/28/22  #90, 3 refills  Simvastatin:  02/08/23  #90, 1 refill    Future Appointments   Date Time Provider Chandan Cody   9/21/2023  1:15 PM Irene Moran MD [facility] [place]

## 2023-09-21 ENCOUNTER — HOSPITAL ENCOUNTER (OUTPATIENT)
Dept: GENERAL RADIOLOGY | Age: 73
Discharge: HOME | End: 2023-09-21
Attending: INTERNAL MEDICINE
Payer: MEDICARE

## 2023-09-21 ENCOUNTER — OFFICE VISIT (OUTPATIENT)
Dept: FAMILY MEDICINE CLINIC | Facility: CLINIC | Age: 73
End: 2023-09-21
Payer: MEDICARE

## 2023-09-21 ENCOUNTER — TELEPHONE (OUTPATIENT)
Dept: FAMILY MEDICINE CLINIC | Facility: CLINIC | Age: 73
End: 2023-09-21

## 2023-09-21 VITALS — HEART RATE: 69 BPM | TEMPERATURE: 98 F | SYSTOLIC BLOOD PRESSURE: 132 MMHG | DIASTOLIC BLOOD PRESSURE: 70 MMHG

## 2023-09-21 DIAGNOSIS — S20.212A RIB CONTUSION, LEFT, INITIAL ENCOUNTER: ICD-10-CM

## 2023-09-21 DIAGNOSIS — Z00.00 ENCOUNTER FOR ANNUAL HEALTH EXAMINATION: Primary | ICD-10-CM

## 2023-09-21 DIAGNOSIS — R26.81 GAIT INSTABILITY: ICD-10-CM

## 2023-09-21 DIAGNOSIS — M16.12 PRIMARY OSTEOARTHRITIS OF LEFT HIP: ICD-10-CM

## 2023-09-21 DIAGNOSIS — R13.10 DYSPHAGIA, UNSPECIFIED TYPE: ICD-10-CM

## 2023-09-21 DIAGNOSIS — Z23 IMMUNIZATION DUE: ICD-10-CM

## 2023-09-21 DIAGNOSIS — J44.0 COPD (CHRONIC OBSTRUCTIVE PULMONARY DISEASE) WITH ACUTE BRONCHITIS: ICD-10-CM

## 2023-09-21 DIAGNOSIS — I10 ESSENTIAL HYPERTENSION: ICD-10-CM

## 2023-09-21 DIAGNOSIS — G31.9 NEURODEGENERATIVE DISORDER (HCC): ICD-10-CM

## 2023-09-21 DIAGNOSIS — J20.9 COPD (CHRONIC OBSTRUCTIVE PULMONARY DISEASE) WITH ACUTE BRONCHITIS: ICD-10-CM

## 2023-09-21 DIAGNOSIS — E78.00 PURE HYPERCHOLESTEROLEMIA: ICD-10-CM

## 2023-09-21 DIAGNOSIS — G24.01 DYSKINESIA, TARDIVE: ICD-10-CM

## 2023-09-21 DIAGNOSIS — G20 PARKINSONISM, UNSPECIFIED PARKINSONISM TYPE: ICD-10-CM

## 2023-09-21 DIAGNOSIS — Z12.39 ENCOUNTER FOR BREAST CANCER SCREENING OTHER THAN MAMMOGRAM: ICD-10-CM

## 2023-09-21 DIAGNOSIS — I65.23 CAROTID STENOSIS, ASYMPTOMATIC, BILATERAL: ICD-10-CM

## 2023-09-21 PROCEDURE — 90662 IIV NO PRSV INCREASED AG IM: CPT | Performed by: INTERNAL MEDICINE

## 2023-09-21 PROCEDURE — G0008 ADMIN INFLUENZA VIRUS VAC: HCPCS | Performed by: INTERNAL MEDICINE

## 2023-09-21 PROCEDURE — 90677 PCV20 VACCINE IM: CPT | Performed by: INTERNAL MEDICINE

## 2023-09-21 PROCEDURE — G0439 PPPS, SUBSEQ VISIT: HCPCS | Performed by: INTERNAL MEDICINE

## 2023-09-21 PROCEDURE — 71101 X-RAY EXAM UNILAT RIBS/CHEST: CPT | Performed by: INTERNAL MEDICINE

## 2023-09-21 PROCEDURE — 1125F AMNT PAIN NOTED PAIN PRSNT: CPT | Performed by: INTERNAL MEDICINE

## 2023-09-21 PROCEDURE — G0009 ADMIN PNEUMOCOCCAL VACCINE: HCPCS | Performed by: INTERNAL MEDICINE

## 2023-09-21 NOTE — TELEPHONE ENCOUNTER
Natalie Lopez states that they did not receive an order for speech if patient is to do this at home. She asked if thee is a decline or change in patient's health. If not she may not qualify for home health.

## 2023-09-22 PROBLEM — S42.201D CLOSED FRACTURE OF PROXIMAL END OF RIGHT HUMERUS WITH ROUTINE HEALING: Status: RESOLVED | Noted: 2022-05-23 | Resolved: 2023-09-22

## 2023-09-28 ENCOUNTER — TELEPHONE (OUTPATIENT)
Dept: FAMILY MEDICINE CLINIC | Facility: CLINIC | Age: 73
End: 2023-09-28

## 2023-09-28 NOTE — TELEPHONE ENCOUNTER
Patient said that she was supposed to stop the Bruna Buys when she was on the other psychotic medications. She said the only thing she is on is Adderall. She states that psychiatrist told her she could come off the Bruna Buys.

## 2023-09-28 NOTE — TELEPHONE ENCOUNTER
Pt wanted to discuss ingrezza. She said it is not working & it is expensive. She thinks her feet moving is more of a habit & wanted to know if she can get something else prescribed to her.

## 2023-09-28 NOTE — TELEPHONE ENCOUNTER
Patient advised. She said that she believes she has restless leg syndrome and wants Dr Yamile Curtis to prescribe something for this.

## 2023-09-29 NOTE — TELEPHONE ENCOUNTER
I cannot, this should be a question addressed to her movement specialist at [facility].

## 2023-10-02 RX ORDER — CARBIDOPA AND LEVODOPA 25; 100 MG/1; MG/1
1 TABLET, EXTENDED RELEASE ORAL NIGHTLY
Qty: 90 TABLET | Refills: 0 | Status: SHIPPED | OUTPATIENT
Start: 2023-10-02 | End: 2023-12-31

## 2023-10-02 RX ORDER — CARBIDOPA AND LEVODOPA 25; 100 MG/1; MG/1
1 TABLET, EXTENDED RELEASE ORAL NIGHTLY
Qty: 90 TABLET | Refills: 0 | Status: SHIPPED | OUTPATIENT
Start: 2023-10-02 | End: 2023-10-02

## 2023-10-02 NOTE — TELEPHONE ENCOUNTER
Patient advised. Script canceled at [facility] and sent to [facility].

## 2023-10-10 NOTE — TELEPHONE ENCOUNTER
PT NEEDS REFILL ON-WITH REFILLS PLEASE    metoprolol tartrate 25 MG Oral Tab     [facility] - [place], [facility] [address] [phone], [address]

## 2023-10-10 NOTE — TELEPHONE ENCOUNTER
Last OV w/Dr Pierre Roy 9/21/23  Last BMP 5/26/23  Last refill was 6/16/23 #90 by the specialist at [address] [place] [address]. Patient no longer has the G-Tube.

## 2023-10-10 NOTE — TELEPHONE ENCOUNTER
Called  to confirm dose of Metoprolol. He said that she is currently in the ambulance in their driveway because she could not walk and was having difficulty breathing. He will call back and update us.

## 2023-10-16 ENCOUNTER — TELEPHONE (OUTPATIENT)
Dept: FAMILY MEDICINE CLINIC | Facility: CLINIC | Age: 73
End: 2023-10-16

## 2023-10-16 NOTE — TELEPHONE ENCOUNTER
Patient said he found the paperwork. It was the paperwork from [employer] that we had given him a copy of.

## 2023-10-25 ENCOUNTER — PATIENT OUTREACH (OUTPATIENT)
Dept: CASE MANAGEMENT | Age: 73
End: 2023-10-25

## 2023-10-25 NOTE — PROGRESS NOTES
10/25/23      CCM assessment call, no answer LM to CB at [phone]. [address] /Health   [address] [place] [address] Management Dept. Miguel 73 3rd Floor  [address] [place] [address]   Office [phone]  Emily Herndon. [facility]

## 2023-10-27 ENCOUNTER — TELEPHONE (OUTPATIENT)
Dept: FAMILY MEDICINE CLINIC | Facility: CLINIC | Age: 73
End: 2023-10-27

## 2023-10-27 NOTE — TELEPHONE ENCOUNTER
Patient rescheduled per request.    Future Appointments   Date Time Provider Chandan Glo   11/7/2023  3:00 PM Charito Jones MD [facility] [place]

## 2023-10-27 NOTE — TELEPHONE ENCOUNTER
Pt has appt on Nov 15th. She wanted to know if we can reschedule it to Nov 7, 9, 13 or 14 after 1 pm or 15th in the morning.

## 2023-11-02 ENCOUNTER — HOME HEALTH CHARGES (OUTPATIENT)
Dept: FAMILY MEDICINE CLINIC | Facility: CLINIC | Age: 73
End: 2023-11-02

## 2023-11-02 DIAGNOSIS — R13.10 DYSPHAGIA, UNSPECIFIED TYPE: Primary | ICD-10-CM

## 2023-11-07 ENCOUNTER — OFFICE VISIT (OUTPATIENT)
Dept: FAMILY MEDICINE CLINIC | Facility: CLINIC | Age: 73
End: 2023-11-07
Payer: MEDICARE

## 2023-11-07 VITALS
DIASTOLIC BLOOD PRESSURE: 68 MMHG | HEIGHT: 62 IN | HEART RATE: 68 BPM | SYSTOLIC BLOOD PRESSURE: 130 MMHG | WEIGHT: 145 LBS | OXYGEN SATURATION: 98 % | TEMPERATURE: 98 F | BODY MASS INDEX: 26.68 KG/M2 | RESPIRATION RATE: 16 BRPM

## 2023-11-07 DIAGNOSIS — A52.11: ICD-10-CM

## 2023-11-07 DIAGNOSIS — I10 ESSENTIAL HYPERTENSION: ICD-10-CM

## 2023-11-07 DIAGNOSIS — H02.9 EYELID ABNORMALITY: ICD-10-CM

## 2023-11-07 DIAGNOSIS — E78.00 PURE HYPERCHOLESTEROLEMIA: ICD-10-CM

## 2023-11-07 DIAGNOSIS — Z01.810 PREOP CARDIOVASCULAR EXAM: ICD-10-CM

## 2023-11-07 DIAGNOSIS — G24.01 DYSKINESIA, TARDIVE: Primary | ICD-10-CM

## 2023-11-07 LAB
ALBUMIN SERPL-MCNC: 4.1 G/DL (ref 3.4–5)
ALBUMIN/GLOB SERPL: 1.4 {RATIO} (ref 1–2)
ALP LIVER SERPL-CCNC: 74 U/L
ALT SERPL-CCNC: 18 U/L
ANION GAP SERPL CALC-SCNC: 5 MMOL/L (ref 0–18)
AST SERPL-CCNC: 12 U/L (ref 15–37)
BASOPHILS # BLD AUTO: 0.02 X10(3) UL (ref 0–0.2)
BASOPHILS NFR BLD AUTO: 0.2 %
BILIRUB SERPL-MCNC: 0.5 MG/DL (ref 0.1–2)
BUN BLD-MCNC: 9 MG/DL (ref 9–23)
CALCIUM BLD-MCNC: 9.3 MG/DL (ref 8.5–10.1)
CHLORIDE SERPL-SCNC: 93 MMOL/L (ref 98–112)
CO2 SERPL-SCNC: 28 MMOL/L (ref 21–32)
CREAT BLD-MCNC: 0.58 MG/DL
EGFRCR SERPLBLD CKD-EPI 2021: 94 ML/MIN/1.73M2 (ref 60–?)
EOSINOPHIL # BLD AUTO: 0.04 X10(3) UL (ref 0–0.7)
EOSINOPHIL NFR BLD AUTO: 0.5 %
ERYTHROCYTE [DISTWIDTH] IN BLOOD BY AUTOMATED COUNT: 12.9 %
GLOBULIN PLAS-MCNC: 3 G/DL (ref 2.8–4.4)
GLUCOSE BLD-MCNC: 95 MG/DL (ref 70–99)
HCT VFR BLD AUTO: 39.2 %
HGB BLD-MCNC: 13.3 G/DL
IMM GRANULOCYTES # BLD AUTO: 0.02 X10(3) UL (ref 0–1)
IMM GRANULOCYTES NFR BLD: 0.2 %
LYMPHOCYTES # BLD AUTO: 2.02 X10(3) UL (ref 1–4)
LYMPHOCYTES NFR BLD AUTO: 23 %
MCH RBC QN AUTO: 29.4 PG (ref 26–34)
MCHC RBC AUTO-ENTMCNC: 33.9 G/DL (ref 31–37)
MCV RBC AUTO: 86.5 FL
MONOCYTES # BLD AUTO: 0.51 X10(3) UL (ref 0.1–1)
MONOCYTES NFR BLD AUTO: 5.8 %
NEUTROPHILS # BLD AUTO: 6.19 X10 (3) UL (ref 1.5–7.7)
NEUTROPHILS # BLD AUTO: 6.19 X10(3) UL (ref 1.5–7.7)
NEUTROPHILS NFR BLD AUTO: 70.3 %
OSMOLALITY SERPL CALC.SUM OF ELEC: 260 MOSM/KG (ref 275–295)
PLATELET # BLD AUTO: 174 10(3)UL (ref 150–450)
POTASSIUM SERPL-SCNC: 3.8 MMOL/L (ref 3.5–5.1)
PROT SERPL-MCNC: 7.1 G/DL (ref 6.4–8.2)
RBC # BLD AUTO: 4.53 X10(6)UL
SODIUM SERPL-SCNC: 126 MMOL/L (ref 136–145)
WBC # BLD AUTO: 8.8 X10(3) UL (ref 4–11)

## 2023-11-07 PROCEDURE — 85025 COMPLETE CBC W/AUTO DIFF WBC: CPT | Performed by: INTERNAL MEDICINE

## 2023-11-07 PROCEDURE — 80053 COMPREHEN METABOLIC PANEL: CPT | Performed by: INTERNAL MEDICINE

## 2023-11-07 PROCEDURE — 99215 OFFICE O/P EST HI 40 MIN: CPT | Performed by: INTERNAL MEDICINE

## 2023-11-07 RX ORDER — ROPINIROLE 0.5 MG/1
0.5 TABLET, FILM COATED ORAL NIGHTLY
Qty: 30 TABLET | Refills: 0 | Status: SHIPPED | OUTPATIENT
Start: 2023-11-07 | End: 2023-11-07

## 2023-11-08 RX ORDER — ROPINIROLE 0.5 MG/1
0.5 TABLET, FILM COATED ORAL NIGHTLY
Qty: 90 TABLET | Refills: 0 | Status: SHIPPED | OUTPATIENT
Start: 2023-11-08 | End: 2024-02-06

## 2023-11-13 ENCOUNTER — TELEPHONE (OUTPATIENT)
Dept: FAMILY MEDICINE CLINIC | Facility: CLINIC | Age: 73
End: 2023-11-13

## 2023-11-13 NOTE — TELEPHONE ENCOUNTER
Patient's  said that home health nurse was there. He said that she has a skin tag by her feeding tube wound. She said that the home health nurse recommended that the area be cauterized. Can Dr Shawn Kahn do this?

## 2023-11-16 ENCOUNTER — OFFICE VISIT (OUTPATIENT)
Dept: FAMILY MEDICINE CLINIC | Facility: CLINIC | Age: 73
End: 2023-11-16
Payer: MEDICARE

## 2023-11-16 VITALS
TEMPERATURE: 98 F | DIASTOLIC BLOOD PRESSURE: 70 MMHG | SYSTOLIC BLOOD PRESSURE: 132 MMHG | BODY MASS INDEX: 25 KG/M2 | WEIGHT: 136 LBS | RESPIRATION RATE: 16 BRPM | OXYGEN SATURATION: 98 % | HEART RATE: 70 BPM

## 2023-11-16 DIAGNOSIS — L92.9 GRANULOMA OF SKIN: Primary | ICD-10-CM

## 2023-11-16 PROCEDURE — 99213 OFFICE O/P EST LOW 20 MIN: CPT | Performed by: INTERNAL MEDICINE

## 2023-11-16 RX ORDER — ROPINIROLE 0.5 MG/1
0.5 TABLET, FILM COATED ORAL 2 TIMES DAILY
Qty: 180 TABLET | Refills: 0 | Status: SHIPPED | OUTPATIENT
Start: 2023-11-16 | End: 2024-02-14

## 2023-11-17 ENCOUNTER — TELEPHONE (OUTPATIENT)
Dept: FAMILY MEDICINE CLINIC | Facility: CLINIC | Age: 73
End: 2023-11-17

## 2023-11-17 NOTE — TELEPHONE ENCOUNTER
I don't see an EKG done here. I do see once was done in the ER on 10/10/23. Is that appropriate for pre-op? Should we call and obtain that tracing of the EKG? Please advise.

## 2023-11-18 NOTE — TELEPHONE ENCOUNTER
Bala Temple was also seeing cardiology after my preop, she just had a normal stress 10/19/2023    She has a CBC and CMP 11/7, these can be faxed with [employer]

## 2023-12-05 ENCOUNTER — TELEPHONE (OUTPATIENT)
Dept: FAMILY MEDICINE CLINIC | Facility: CLINIC | Age: 73
End: 2023-12-05

## 2023-12-06 ENCOUNTER — TELEPHONE (OUTPATIENT)
Dept: FAMILY MEDICINE CLINIC | Facility: CLINIC | Age: 73
End: 2023-12-06

## 2023-12-06 NOTE — TELEPHONE ENCOUNTER
said that [facility] diagnosed patient with Progressive Ataxia or Palatal tremor. He states this is the cause of her speech, memory, and eating issues. He said he can email this letter to Dr Tyrese Briceño if she needs it. He would like the dentist to be notified. He is asking that Dr Tyrese Briceño call him.

## 2023-12-06 NOTE — TELEPHONE ENCOUNTER
Patient's  said that he received an email form Sonja Horton that patient has Progressive ataxia or Palatal tremor. See phone note from 12/6/23.

## 2023-12-06 NOTE — TELEPHONE ENCOUNTER
Pt  called back and said that he would like to talk to nurse or Dr in regards to his message that he called in yesterday about Dr talking to Dr. Brayan Price.

## 2023-12-14 ENCOUNTER — TELEPHONE (OUTPATIENT)
Dept: FAMILY MEDICINE CLINIC | Facility: CLINIC | Age: 73
End: 2023-12-14

## 2023-12-14 NOTE — TELEPHONE ENCOUNTER
Pt  called and said that pt has a bad sore throat that started a couple days ago. Offered to sched pt but  said he just wants to talk to nurse.

## 2023-12-14 NOTE — TELEPHONE ENCOUNTER
said that patient has had a sore throat for a couple days. She has been using Chloraseptic Spray and Orajel on her check because a tooth is \"rubbing against it\". He denies that she has any other symptoms. Patient is asking for a steroid.

## 2023-12-14 NOTE — TELEPHONE ENCOUNTER
Didin't she just have surgery ? this needs to heal without interruption and she needs have testing done if she needs sore thing for sore throat,. We do both strep and COVID testing in the office.  Make appt

## 2023-12-15 ENCOUNTER — OFFICE VISIT (OUTPATIENT)
Dept: FAMILY MEDICINE CLINIC | Facility: CLINIC | Age: 73
End: 2023-12-15
Payer: MEDICARE

## 2023-12-15 VITALS
WEIGHT: 130 LBS | TEMPERATURE: 99 F | RESPIRATION RATE: 30 BRPM | BODY MASS INDEX: 24 KG/M2 | OXYGEN SATURATION: 96 % | HEART RATE: 77 BPM | DIASTOLIC BLOOD PRESSURE: 58 MMHG | SYSTOLIC BLOOD PRESSURE: 122 MMHG

## 2023-12-15 DIAGNOSIS — K11.20 SIALADENITIS: Primary | ICD-10-CM

## 2023-12-15 PROCEDURE — 99213 OFFICE O/P EST LOW 20 MIN: CPT

## 2023-12-15 RX ORDER — PREDNISONE 20 MG/1
20 TABLET ORAL DAILY
Qty: 5 TABLET | Refills: 0 | Status: SHIPPED | OUTPATIENT
Start: 2023-12-15 | End: 2023-12-20

## 2023-12-15 RX ORDER — AMOXICILLIN AND CLAVULANATE POTASSIUM 875; 125 MG/1; MG/1
1 TABLET, FILM COATED ORAL 2 TIMES DAILY
Qty: 20 TABLET | Refills: 0 | Status: SHIPPED | OUTPATIENT
Start: 2023-12-15 | End: 2023-12-25

## 2023-12-18 ENCOUNTER — TELEPHONE (OUTPATIENT)
Dept: FAMILY MEDICINE CLINIC | Facility: CLINIC | Age: 73
End: 2023-12-18

## 2023-12-18 NOTE — TELEPHONE ENCOUNTER
Patient said that she fell against the dresser a couple weeks ago and hurt her back. She said she has not been using the walker to walk far because it hurts her back. She said the she has not strength in her left leg. She said that she needs thinks she needs a hip replacement. She is asking for an MRI or CT scan of her left hip and upper back.

## 2023-12-21 RX ORDER — CLOPIDOGREL BISULFATE 75 MG/1
75 TABLET ORAL DAILY
Qty: 90 TABLET | Refills: 3 | Status: SHIPPED | OUTPATIENT
Start: 2023-12-21

## 2023-12-21 NOTE — TELEPHONE ENCOUNTER
CLOPIDOGREL 75 MG Oral Tab     Last office visit: 11/7/23   Future Appointments   Date Time Provider Chandan Glo   12/28/2023  1:45 PM Rachael Fair MD [facility] [place]   Last filled:  11/28/22  #90 with 3 refills   Last labs:  11/7/23

## 2023-12-28 ENCOUNTER — OFFICE VISIT (OUTPATIENT)
Dept: FAMILY MEDICINE CLINIC | Facility: CLINIC | Age: 73
End: 2023-12-28
Payer: MEDICARE

## 2023-12-28 VITALS
SYSTOLIC BLOOD PRESSURE: 134 MMHG | DIASTOLIC BLOOD PRESSURE: 72 MMHG | HEART RATE: 73 BPM | WEIGHT: 133 LBS | RESPIRATION RATE: 16 BRPM | OXYGEN SATURATION: 98 % | BODY MASS INDEX: 24 KG/M2 | TEMPERATURE: 99 F

## 2023-12-28 DIAGNOSIS — M25.552 LEFT HIP PAIN: Primary | ICD-10-CM
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
## 2023-12-28 PROCEDURE — 99214 OFFICE O/P EST MOD 30 MIN: CPT | Performed by: INTERNAL MEDICINE

## 2023-12-28 RX ORDER — OMEPRAZOLE 40 MG/1
40 CAPSULE, DELAYED RELEASE ORAL DAILY
Qty: 90 CAPSULE | Refills: 0 | Status: SHIPPED | OUTPATIENT
Start: 2023-12-28 | End: 2024-12-22

## 2023-12-28 RX ORDER — ROPINIROLE 0.5 MG/1
0.5 TABLET, FILM COATED ORAL NIGHTLY
Qty: 90 TABLET | Refills: 0 | Status: SHIPPED | OUTPATIENT
Start: 2023-12-28 | End: 2024-03-27

## 2024-01-02 ENCOUNTER — PATIENT OUTREACH (OUTPATIENT)
Dept: CASE MANAGEMENT | Age: 74
End: 2024-01-02

## 2024-01-02 ENCOUNTER — TELEPHONE (OUTPATIENT)
Dept: FAMILY MEDICINE CLINIC | Facility: CLINIC | Age: 74
End: 2024-01-02

## 2024-01-02 NOTE — PROGRESS NOTES
01/02/24      CCM assessment call, spoke to spouse, Eugene, and explained to him CCM services and he would like more information about the program sent to Pt's Neris. I will follow up in 2 weeks.         Alyse Camacho, [facility]     CCM Care Manager/Health    Population Health Management Dept.  [email or web address]  Office [phone]   [facility] [place] [id number]

## 2024-01-03 ENCOUNTER — TELEPHONE (OUTPATIENT)
Dept: FAMILY MEDICINE CLINIC | Facility: CLINIC | Age: 74
End: 2024-01-03

## 2024-01-03 NOTE — TELEPHONE ENCOUNTER
NEEDS ADDENDUM ON NOTES SENT TODAY FOR AN ORDER FOR A LIGHTWEIGHT WHEELCHAIR. QUESTIONS WERE FAXED OVER EARLIER TODAY.

## 2024-01-09 ENCOUNTER — TELEPHONE (OUTPATIENT)
Dept: FAMILY MEDICINE CLINIC | Facility: CLINIC | Age: 74
End: 2024-01-09

## 2024-01-09 NOTE — TELEPHONE ENCOUNTER
Chart notes faxed to Sofie. CMS form for reclining chair with lift mechanism purchased at [employer] mailed to [employer] attn claims [phone] [place] [address].

## 2024-01-10 NOTE — TELEPHONE ENCOUNTER
OV 12/2023  LABS 11/2023    REFILL DISPENSED 10/12 #90    Future Appointments   Date Time Provider Department Center   2/8/2024  2:00 PM Evelyn Solomon PT [facility] [place]   2/15/2024  1:15 PM Evelyn Solomon PT [facility] [place]   2/20/2024 11:30 AM Evelyn Solomon, PT [facility] [place]   2/22/2024  1:15 PM Evelyn Solomon PT [facility] [place]   2/27/2024 10:45 AM Evelyn Solomon PT [facility] [place]

## 2024-01-25 ENCOUNTER — PATIENT OUTREACH (OUTPATIENT)
Dept: CASE MANAGEMENT | Age: 74
End: 2024-01-25

## 2024-01-25 NOTE — PROGRESS NOTES
Patient identified with a potential need for Chronic Care Management services.  Called patient to introduce self and availability of Chronic Care Management services.  Patient informed about the following service elements:  Health information sharing - complying with all laws related to privacy and security of their health information  Patient/Insurance Cost sharing - includes deductible and any ongoing copays and/or coinsurance  Only one provider can bill for this service monthly  Patient right to discontinue services at any time for any reason effective last day of current month  Patient verbally declines to participate in Chronic Care Management services and any associated charges.

## 2024-02-02 ENCOUNTER — TELEPHONE (OUTPATIENT)
Dept: PHYSICAL THERAPY | Facility: HOSPITAL | Age: 74
End: 2024-02-02

## 2024-02-08 ENCOUNTER — APPOINTMENT (OUTPATIENT)
Dept: PHYSICAL THERAPY | Age: 74
End: 2024-02-08
Attending: INTERNAL MEDICINE
Payer: MEDICARE

## 2024-02-08 ENCOUNTER — MED REC SCAN ONLY (OUTPATIENT)
Dept: FAMILY MEDICINE CLINIC | Facility: CLINIC | Age: 74
End: 2024-02-08

## 2024-02-13 NOTE — PROGRESS NOTES
LOWER EXTREMITY EVALUATION:     Diagnosis:   Left hip pain (M25.552      Low back pain   Gait dysfunction  Falls   Referring Provider: Lex  Date of Evaluation:    2/15/2024    Precautions:  Fall Risk Next MD visit:   none scheduled  Date of Surgery: n/a     PATIENT SUMMARY   Leann Chatman is a 73 year old female who presents to therapy today with complaints of difficulty walking, imbalance, falls, LE weakness, L hip pain and low back pain all impacting MRADLS. She co she has no strength in LLE , known history of spinal stenosis. Wobble when walking. L hip bone on bone. She feels things has been getting worse over the last 2 years and her LLE is more rotated out, history of foot drop.   Pt describes pain level current 5/10, at best 5/10, at worst 5/10. L lateral hip pain, radiating leg pain to lateral ankle. She reports a severe fall 8 or 9 years ago after doing the splits and has had a limp since then.   Current functional limitations include all MRADLs including stair negotiation, walking across a room, transfers, bed mobility, dressing, meal prep standing > 5 mins. She co tingling in L leg, worse at night. She is concerned with falls and a decline in balance.   3 story home, - lives with spouse, who also uses a walker to get around and sometimes scoots. Pt uses a walk in shower with chair. She reports she has caregivers 4 hours/day 4 days/week. Pt reports Caregiver Estephania, present She reports they do most of the household cleaning/bed changing. Leann does her own cooking still. Pt reports she mainly stays on the main floor, does go up stairs to bedroom at night. She reports she does try to walk without her walker for about 10-15 ft, will furniture walk on occasion. She reports her pain increases with more activity, worse laying downhe reports her back hurts more the  more she walks.      Leann describes prior level of function generally sedentary aside from exercise routine. She states she does exercise at  home regularuly with bands and free weights following a home program from [facility]. Pt goals include \"pain relief\".  Past medical history was reviewed with Leann. Significant findings include  has a past medical history of Cataract, Closed fracture of transverse process of lumbar vertebra with routine healing (09/05/2017), Contact dermatitis and other eczema, due to unspecified cause (02/08/2000), Depression, Difficult intubation, Diverticulosis, GERD (gastroesophageal reflux disease), Hearing impairment, Hemorrhoids, Herpes zoster (06/08/2015), History of lumbar discectomy (09/25/2014), History of right-sided carotid endarterectomy (07/27/2020), HTN (hypertension), Hyperlipidemia, Impaired fasting glucose, Irritable bowel, OAB (overactive bladder) (04/08/2015), Osteoarthritis of left hip (09/04/2014), Osteoarthritis of lumbar spine, Psoriasis, Subclavian arterial stenosis (HCC) (01/12/2021), Tardive dyskinesia, and Vitamin D deficiency (09/20/2012).osteoporosis.       ASSESSMENT  Leann presents to physical therapy evaluation with primary c/o difficulty walking, imbalance, falls, LE weakness, L hip pain and low back pain all impacting MRADLS. The results of the objective tests and measures show limited hip ROM, decreased LE flexibility, core strength and postural control poor SLS time and LE weakness all impacting MRALDs and fall risk.  Functional deficits include but are not limited to all MRADLs including stair negotiation, walking across a room, transfers, bed mobility, dressing, meal prep standing > 5 mins..  Signs and symptoms are consistent with diagnosis of L hip pain, low back pain, potential radiculopathy with stenosis and greater troch bursitis impacting stability and increasing fall risk. Pt and PT discussed evaluation findings, pathology, POC and HEP.  Pt's comorbidities and psychosocial issues may impact PT POC and pt progress.Pt voiced understanding and performs HEP correctly without reported pain.  Skilled Physical Therapy is medically necessary to address the above impairments and reach functional goals.     OBJECTIVE:   Observation: difficulty sitting unsupported, posterior disequilibrium, potential hip flexion contracture limiting  Palpation: TTP greater troch LLE   Sensation: light touch diminished/dulled LLE  Patellar LLE 3/4RLE 2+/4  AROM Lumbar Spine:   FLEX mod limited   EXT severely limited    AROM: (* denotes performed with pain)  Hip   Flexion: R 90; L 88*  Extension: R mod limited; L mod limited  Abduction: R 30; L 18*  ER: R 13; L 20  IR: R 20; L 22       Flexibility:  Hip Flexor: R min, L mod  Hamstrings: R min; L min  Piriformis: R min; L unable to test 2/2 pain  Quads: R min; L min  Gastroc-soleus: R mod; L mod    Strength/MMT: (* denotes performed with pain)  Hip   Flexion: R 4/5; L 3+*/5  Extension: R 3-/5; L 3-/5  Abduction: R 4/5; L 3-*/5  ER: R 4/5; L 3+*/5  IR: R 4/5; L 3+*/5     Knee Foot/Ankle   Flexion: R 4+; L 4*  Extension: R 4+; L 4*    DF: R 4-; L 3       Special tests:   Repeated Motions Testing: -  Shift Correction: NT  Lumbar Quadrant Testing: +  Slump: -  SLR: R -, L +45 deg   Trendelenburg Sign: R -, L +  TU.3s with rollator  Scour Test: R -, L +  FADDIR and PUNEET + LLE      Gait: pt ambulates on level ground with assistive device of rollator, antalgia, decreased step length RLE, decreased stance phase LLE, decreased hip/knee flex or ext BLE, decreased foot clearance LLE, drop foot, and trendelenburg/waddle  Balance: SLS: R unable without UE support sec, L unable without UE support sec    Today’s Treatment and Response:   Pt education was provided on exam findings, treatment diagnosis, treatment plan, expectations, and prognosis. Pt was also provided recommendations for activity modifications, possible soreness after evaluation, postural corrections, importance of remaining active, and strategies to reduce fall risk at home.  Patient was instructed in and issued a HEP  for: sit to stand without Leveraging Les on support surface, forward weight shift 10x2, continued HEP    Charges: PT Eval High Complexity, 1 ther ex      Total Timed Treatment: 8 min     Total Treatment Time: 48 min     Based on clinical rationale and outcome measures, this evaluation involved High Complexity decision making due to 3+ personal factors/comorbidities, 4+ body structures involved/activity limitations, and evolving symptoms including changing pain levels.  PLAN OF CARE:    Goals: (to be met in 14 visits)  Pt will improve transversus abdominis recruitment to perform proper isometric contraction without requiring verbal or tactile cuing to promote advancement of therex   Pt will demonstrate good understanding of proper posture and body mechanics to decrease pain and improve spinal safety   Pt will report improved symptom centralization and absence of radicular symptoms for 3 consecutive days to improve function with ADL   Pt will have decreased paraspinal mm tension to tolerate standing >10 minutes for work and home activities   Pt will demonstrate improved core strength to be able to perform isometric push downs with <5/10 pain   Pt will have improved hip AROM Flex to 90 deg and ABD to 22 deg to be able to don/doff shoes and perform car transfers without difficulty   Pt will improve hip ABD and ER strength to 4+/5 to increase ease with standing and walking   Pt will be able to squat to  light objects around the house with <5/10 hip pain   Pt will improve functional hip strength to report ability to ascend/descend 1 flight of stairs with use of handrail   Pt will demonstrate improved SLS to 2 seconds RONEN to promote safety and decrease risk of falls on uneven surfaces such as grass and gravel   Pt will perform TUG in <27 seconds, with LRAD demonstrating improved gait speed for improved community ambulation  Pt will be independent and compliant with comprehensive HEP to maintain progress achieved in  PT       Frequency / Duration: Patient will be seen for 1-2 x/week or a total of 14 visits over a 90 day period. Treatment will include: Gait training, Manual Therapy, Mechanical Traction, Neuromuscular Re-education, Self-Care Home Management, Therapeutic Activities, Therapeutic Exercise, Home Exercise Program instruction, and Modalities to include: Electrical stimulation (unattended), Electrical stimulation (attended), and Ultrasound    Education or treatment limitation:  see assessment  Rehab Potential:good    FES Score  Score: 78.13 % (2/7/2024 11:00 AM)    Score and level of concern: 50 - high concern (2/7/2024 11:00 AM)      Patient/Family/Caregiver was advised of these findings, precautions, and treatment options and has agreed to actively participate in planning and for this course of care.    Thank you for your referral. Please co-sign or sign and return this letter via fax as soon as possible to [phone]. If you have any questions, please contact me at Dept: [phone]    Sincerely,  Electronically signed by therapist: Evelyn Solomon, PT, DPT  Physician's certification required: Yes  I certify the need for these services furnished under this plan of treatment and while under my care.    X___________________________________________________ Date____________________    Certification From: 2/13/2024  To:5/13/2024

## 2024-02-15 ENCOUNTER — OFFICE VISIT (OUTPATIENT)
Dept: PHYSICAL THERAPY | Age: 74
End: 2024-02-15
Attending: INTERNAL MEDICINE
Payer: MEDICARE

## 2024-02-15 DIAGNOSIS — M25.552 LEFT HIP PAIN: Primary | ICD-10-CM

## 2024-02-15 PROCEDURE — 97163 PT EVAL HIGH COMPLEX 45 MIN: CPT

## 2024-02-15 PROCEDURE — 97110 THERAPEUTIC EXERCISES: CPT

## 2024-02-19 ENCOUNTER — TELEPHONE (OUTPATIENT)
Dept: FAMILY MEDICINE CLINIC | Facility: CLINIC | Age: 74
End: 2024-02-19

## 2024-02-19 RX ORDER — ROPINIROLE 0.5 MG/1
0.5 TABLET, FILM COATED ORAL NIGHTLY
Qty: 90 TABLET | Refills: 0 | Status: CANCELLED | OUTPATIENT
Start: 2024-02-19 | End: 2024-05-19

## 2024-02-19 NOTE — TELEPHONE ENCOUNTER
LAST REFILL 12/28/23 #90 - REFILL REQUESTED TOO SOON.     Spoke with Cindy - rx was not picked up at that time - will call pharmacy for refill.     No further actions needed.

## 2024-02-20 ENCOUNTER — OFFICE VISIT (OUTPATIENT)
Dept: PHYSICAL THERAPY | Age: 74
End: 2024-02-20
Attending: INTERNAL MEDICINE
Payer: MEDICARE

## 2024-02-20 PROCEDURE — 97140 MANUAL THERAPY 1/> REGIONS: CPT

## 2024-02-20 PROCEDURE — 97110 THERAPEUTIC EXERCISES: CPT

## 2024-02-20 NOTE — PROGRESS NOTES
Diagnosis:   Left hip pain (M25.552      Low back pain   Gait dysfunction  Falls      Referring Provider: Lex  Date of Evaluation:    2/15/24    Precautions:  Fall Risk Next MD visit:   none scheduled  Date of Surgery: n/a   Insurance Primary/Secondary: MEDICARE / [employer]     #  Visits: Medical Necessity, progress notes every 10 visits            Subjective: Pt denies falls since last visit. She states this morning she had lots of pain in her L hip but it eventually went away after getting up and moving for the day,    Pain: 0/10 currently       Objective:   All of the below objective measures are from evaluation for reference:  AROM: (* denotes performed with pain)  Hip   Flexion: R 90; L 88*  Extension: R mod limited; L mod limited  Abduction: R 30; L 18*  ER: R 13; L 20  IR: R 20; L 22     TU.3s with rollator     Assessment: Greater troch bursitis suspicion, hip ABD and glut strength focus with t band and eccentric stand to sit. Mod cues for set up and control.    Goals:    (to be met in 14 visits)  Pt will improve transversus abdominis recruitment to perform proper isometric contraction without requiring verbal or tactile cuing to promote advancement of therex   Pt will demonstrate good understanding of proper posture and body mechanics to decrease pain and improve spinal safety   Pt will report improved symptom centralization and absence of radicular symptoms for 3 consecutive days to improve function with ADL   Pt will have decreased paraspinal mm tension to tolerate standing >10 minutes for work and home activities   Pt will demonstrate improved core strength to be able to perform isometric push downs with <5/10 pain   Pt will have improved hip AROM Flex to 90 deg and ABD to 22 deg to be able to don/doff shoes and perform car transfers without difficulty   Pt will improve hip ABD and ER strength to 4+/5 to increase ease with standing and walking   Pt will be able to squat to  light objects around  the house with <5/10 hip pain   Pt will improve functional hip strength to report ability to ascend/descend 1 flight of stairs with use of handrail   Pt will demonstrate improved SLS to 2 seconds RONEN to promote safety and decrease risk of falls on uneven surfaces such as grass and gravel   Pt will perform TUG in <27 seconds, with LRAD demonstrating improved gait speed for improved community ambulation  Pt will be independent and compliant with comprehensive HEP to maintain progress achieved in PT     Plan: Address ROM deficits, soft tissue length, balance and trunk control for improved force production to reduce fall risk.  Date: 2/20/2024  TX#: 2/10 Date:                 TX#: 3/ Date:                 TX#: 4/ Date:                 TX#: 5/ Date:   Tx#: 6/   THERE EX: 30 mins  Warm up Nu step 5 mins level 4  Standing gastroc stretch 30 secs 3x BLE incline board   SL hip flexor stretch 30 secs 3x   SL clams 10x2*  Manual piriformis, glut, hamstring 30 sec 3x each LE  Hip ADD ball squeeze 10x 5 sec hold  Hip ABD red band B hook lying 10x2 eccentric*  STS 5x2 hand from table no standing support eccentric sit * 21 in mat table       MANUAL:  SL L lateral hip and glut 10 mins effleurage         NEURO RE ED: 5 mins  // bars rock step weight shifting  each LE 15x each LE mod demo              HEP: Denoted with * sit to stand without Leveraging Les on support surface, forward weight shift 10x2, continued HEP   Access Code: [email or web address]  URL: [email or web address]  Date: 02/20/2024  Prepared by: Aracelis Solomon    Exercises  - Clamshell  - 1 x daily - 7 x weekly - 2 sets - 10 reps  - Supine Piriformis Stretch with Foot on Ground  - 1 x daily - 7 x weekly - 3 sets - 1 reps - 30 sec hold  - Hooklying Clamshell with Resistance  - 1 x daily - 7 x weekly - 2 sets - 10 reps  - Sit to Stand with Counter Support  - 1 x daily - 7 x weekly - 3 sets - 10 reps    Charges: 1 manual 2 ther ex       Total Timed Treatment: 45 min  Total  Treatment Time: 45 min

## 2024-02-22 ENCOUNTER — OFFICE VISIT (OUTPATIENT)
Dept: FAMILY MEDICINE CLINIC | Facility: CLINIC | Age: 74
End: 2024-02-22
Payer: MEDICARE

## 2024-02-22 ENCOUNTER — OFFICE VISIT (OUTPATIENT)
Dept: PHYSICAL THERAPY | Age: 74
End: 2024-02-22
Attending: INTERNAL MEDICINE
Payer: MEDICARE

## 2024-02-22 VITALS
TEMPERATURE: 100 F | DIASTOLIC BLOOD PRESSURE: 70 MMHG | BODY MASS INDEX: 25 KG/M2 | WEIGHT: 137 LBS | SYSTOLIC BLOOD PRESSURE: 112 MMHG | OXYGEN SATURATION: 97 % | HEART RATE: 70 BPM

## 2024-02-22 DIAGNOSIS — M48.061 SPINAL STENOSIS AT L4-L5 LEVEL: Primary | ICD-10-CM

## 2024-02-22 PROCEDURE — 97140 MANUAL THERAPY 1/> REGIONS: CPT

## 2024-02-22 PROCEDURE — 99214 OFFICE O/P EST MOD 30 MIN: CPT | Performed by: INTERNAL MEDICINE

## 2024-02-22 PROCEDURE — 97110 THERAPEUTIC EXERCISES: CPT

## 2024-02-22 NOTE — PROGRESS NOTES
Leann Chatman is a 73 year old female.  HPI:   Pt has been having pain in the hip and low back pain for years.  She has had an MRI in the past, 2021, that demonstrates spinal stenosis. She has been feeling better and started sleeping upstairs because the bed downstairs is too hard. She is high falls risk.  She has been doing therapy for low back pain and gait dysfunction and showing some improvement. She would like to see pain management to help as well with injections if she is a candidate.    ROCEDURE: MRI CERVICAL SPINE W WO CONTRAST   .   HISTORY:72-year-old woman with weakness and ataxia.     COMPARISON:Cervical spine MRI dated February 19, 2022, cervical spine CT dated February 9, 2022, and cervical spine radiographs dated August 17, 2007.     TECHNIQUE: Sagittal T2, STIR, T1, and T1 postcontrast with fat saturation and axial MEDIC, 3-D T2, and T1 postcontrast images of the cervical spine were obtained.      FINDINGS: The vertebral body heights are maintained.  The cervical lordosis is preserved.  There is approximately 1-2 mm anterolisthesis of C5 on C6.  Several of the intervertebral discs demonstrate some loss of T2 signal, consistent with disc degeneration.  Small endplate osteophytes are noted at C5-6.  Moderate loss of intervertebral disc height is seen at this level.  No areas of worrisome marrow signal abnormality are identified.     The visible portions of the spinal cord demonstrate appropriate contour, caliber, and signal characteristics.  No abnormal intrathecal enhancement is seen.     There is an extra-axial lesion in the left dorsal epidural space at the C6 level.  This finding measures approximately 6.5 mm x 5.5 mm in the axial plane.  It is markedly T1 and T2 hypointense.  Correlation with the CT dated February 9, 2022 demonstrates that the lesion is ossified.  There is a small amount of surrounding epidural enhancement, and this lesion could represent an ossified meningioma.  This lesion  results in mild spinal canal stenosis at the C6 level.     C2-3: There is a small posterior disc bulge with mild ligamentum flavum thickening.  Moderate left facet joint degenerative changes are noted.  At most mild left neural foraminal stenosis is noted.  No significant spinal canal or right neural foraminal stenosis is seen.     C3-4: A small central disc protrusion is seen.  There is thickening of ligamentum flavum.  Mild right and mild to moderate left facet joint degenerative changes are noted.  These findings contribute to at most mild spinal canal stenosis.  The neural foramina are patent.     C4-5: Mild right and moderate to severe left facet joint degenerative changes are noted.  There is a small central disc protrusion.  No significant spinal canal stenosis is seen.  The right neural foramen is patent.  Mild to moderate left neural foraminal stenosis is seen.     C5-6: There is a broad-based posterior disc osteophyte complex with thickening of the ligamentum flavum.  Mild spinal canal stenosis is seen.  Mild to moderate left and mild right facet joint degenerative changes are seen.  At most mild bilateral neural foraminal stenoses are seen.     C6-7: There is a small posterior disc bulge with mild ligamentum flavum thickening.  Mild bilateral facet joint degenerative changes are noted.  No significant spinal canal or neural foraminal stenosis is seen.     C7-T1: No significant spinal canal or neural foraminal stenosis is seen.     IMPRESSION:     1. Multilevel degenerative changes, as described above.  Mild spinal canal stenosis is seen at a few levels.  Mild to moderate left neural foraminal stenosis is seen at C4-5, and there is mild neural foraminal stenosis at a few other levels.  Please refer to the body of the report for further detail.     2.  Findings favored to represent a small ossified meningioma in the left dorsal epidural space at the C6 level.  This finding results in mild spinal canal  stenosis.     3.  No abnormalities of the spinal cord.       PROCEDURE:  MRI SPINE LUMBAR (W+WO) (CPT=72158)     COMPARISON:  [facility], XR, XR CHEST PA + LAT CHEST (CPT=71046), 1/09/2021, 11:06 AM.     INDICATIONS:  I77.1 Subclavian artery stenosis (HCC) I65.23 Carotid stenosis, non-symptomatic, bilateral     TECHNIQUE:  Multiplanar T1 and T2 weighted images including fat suppression sequences.  Images acquired in sagittal and axial planes. Intravenous gadolinium was administered followed by multiplanar post-infusion T1 weighted sequences.       PATIENT STATED HISTORY:(As transcribed by Technologist)  Bilateral lower extremity weakness. Difficulty speaking with lip quiver post subclavian stent insertion Jan 2021.      CONTRAST USED:  15 mL of Dotarem     FINDINGS:  Kyphoplasty changes and chronic compression deformity of T12 vertebral body appear relatively unchanged.     There is normal lumbar lordosis with trace anterolisthesis of L4 on L5 by 5 mm.  Mild disc desiccation, disc height loss, endplate spurring, and degenerative endplate marrow signal changes scattered in the lumbar spine.  No focal worrisome marrow signal  abnormality is seen. No suspicious enhancement.  Scattered degenerative enhancement along the facet joints and ligamentum flavum, most pronounced in the lower lumbar spine.     The distal spinal cord and conus medullaris have a normal signal and morphology.  The conus medullaris terminates at the approximate L1 level.  The roots of the cauda equina are thickened and redundant, likely related chronic mechanical irritation.  No  focal mass or fluid collection is seen in the lumbar spinal canal.  The paraspinal soft tissues are unremarkable.     Scattered disc protrusions in partially imaged thoracic spine could be further assessed with dedicated thoracic spine MRI as clinically directed.     T11-12:  Mild diffuse disc bulge with endplate osteophytes and mild facet arthropathy. There is no  significant spinal canal or neural foraminal stenosis.     T12-L1:  Mild diffuse disc bulge with endplate osteophytes.  Mild facet arthropathy. There is no significant spinal canal or neural foraminal stenosis.     L1-2:  Diffuse disc bulge with endplate osteophytes.  Mild facet arthropathy with thickening of the ligamentum flavum.  No significant spinal canal stenosis.  Mild bilateral neural foraminal stenosis.     L2-3:  Diffuse disc bulge with endplate osteophytes.  Mild facet arthropathy with thickening of the ligamentum flavum.  Mild spinal canal stenosis.  Mild bilateral neural foraminal stenosis.     L3-4:  Diffuse disc bulge with endplate osteophytes.  Mild to moderate facet arthropathy with thickening of ligamentum flavum.  Mild spinal canal stenosis.  Mild bilateral neural foraminal stenosis.     L4-5:  There is uncovering of the intervertebral disc with a diffuse disc bulge and endplate osteophytes.  Moderate to severe facet arthropathy with thickening of ligamentum flavum.  Moderate spinal canal stenosis with mild bilateral subarticular zone  narrowing.  Mild to moderate bilateral neural foraminal stenosis.     L5-S1:  Mild diffuse disc bulge with endplate osteophytes.  Moderate facet arthropathy. There is no significant spinal canal or neural foraminal stenosis.                      Impression   CONCLUSION:       1. Multilevel degenerative changes lumbar spine as above, most pronounced at L4-5.     2. Mild spinal canal stenosis at L2-3 and L3-4.  Moderate spinal canal stenosis with mild bilateral subarticular zone narrowing at L4-5.     3. Mild bilateral neural foraminal stenosis at L1-2, L2-3, and L3-4.  Mild to moderate bilateral neural foraminal stenosis at L4-5.     4. Facet arthropathy throughout the lumbar spine, most pronounced at L4-5.     5. Stable kyphoplasty changes and chronic compression deformity involving the T12 vertebral body.     Please see above for further details.         Dictated  by (CST): Raheel Wilkinson MD on 4/09/2021 at 12:49 PM      Finalized by (CST): Raheel Wilkinson MD on 4/09/2021 at 12:56 PM             Current Outpatient Medications   Medication Sig Dispense Refill    metoprolol tartrate 25 MG Oral Tab TAKE 1 TABLET (25 MG TOTAL) BY MOUTH DAILY. 90 tablet 1    Omeprazole 40 MG Oral Capsule Delayed Release Take 1 capsule (40 mg total) by mouth daily. 90 capsule 0    rOPINIRole 0.5 MG Oral Tab Take 1 tablet (0.5 mg total) by mouth at bedtime. 90 tablet 0    clopidogrel 75 MG Oral Tab Take 1 tablet (75 mg total) by mouth daily. 90 tablet 3    amLODIPine 10 MG Oral Tab TAKE 1 TABLET EVERY DAY 90 tablet 0    simvastatin 20 MG Oral Tab Take 1 tablet (20 mg total) by mouth nightly. 90 tablet 0    clobetasol 0.05 % External Cream Apply 1 Application topically daily.      acetaminophen 325 MG Oral Tab Take 2 tablets (650 mg total) by mouth every 4 (four) hours as needed for Pain.      LORazepam 0.5 MG Oral Tab Take 1 tablet (0.5 mg total) by mouth 2 (two) times daily.      aspirin 81 MG Oral Chew Tab Chew 1 tablet (81 mg total) by mouth daily.      Cyanocobalamin (B-12) 500 MCG Oral Tab Take 1,000 Units by mouth daily.        fluticasone propionate 50 MCG/ACT Nasal Suspension 2 sprays by Each Nare route daily. (Patient not taking: Reported on 11/16/2023)      pantoprazole 40 MG Oral Tab EC Take 40 mg by mouth every morning before breakfast. Granule packet per G-tube daily (Patient not taking: Reported on 11/16/2023)      alum-mag hydroxide-simethicone 200-200-20 MG/5ML Oral Suspension Take 30 mL by mouth 4 (four) times daily as needed for Indigestion. (Patient not taking: Reported on 11/16/2023)      bisacodyl 10 MG Rectal Suppos Place 1 suppository (10 mg total) rectally daily. (Patient not taking: Reported on 11/16/2023)      cycloSPORINE (RESTASIS) 0.05 % Ophthalmic Emulsion Place 1 drop into both eyes 2 (two) times daily. (Patient not taking: Reported on 11/16/2023)      Valbenazine  Tosylate (INGREZZA) 40 MG Oral Cap Take by mouth daily. (Patient not taking: Reported on 2023)        Past Medical History:   Diagnosis Date    Cataract     starting    Closed fracture of transverse process of lumbar vertebra with routine healing 2017    Contact dermatitis and other eczema, due to unspecified cause 2000    Depression     Difficult intubation     pt says she has been told to say she has a small mouth/throat    Diverticulosis     GERD (gastroesophageal reflux disease)     EGD 2011    Hearing impairment     otolodosclerosis both ears; implant right ear    Hemorrhoids     Herpes zoster 2015    History of lumbar discectomy 2014    History of right-sided carotid endarterectomy 2020    HTN (hypertension)     Hyperlipidemia     Impaired fasting glucose     Irritable bowel     OAB (overactive bladder) 2015    Osteoarthritis of left hip 2014    Osteoarthritis of lumbar spine     Psoriasis     Subclavian arterial stenosis (HCC) 2021    Tardive dyskinesia     Vitamin D deficiency 2012      Social History:  Social History     Socioeconomic History    Marital status:     Number of children: 2   Occupational History    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   Tobacco Use    Smoking status: Former     Packs/day: 0.00     Years: 30.00     Additional pack years: 0.00     Total pack years: 0.00     Types: Cigarettes     Quit date: 2016     Years since quittin.8    Smokeless tobacco: Never   Vaping Use    Vaping Use: Never used   Substance and Sexual Activity    Alcohol use: Yes     Comment: beer sometimes    Drug use: No    Sexual activity: Yes     Partners: Male   Other Topics Concern    Caffeine Concern No     Comment: 2 cups coffee in AM    Exercise No     Comment: 2x per week        REVIEW OF SYSTEMS:   GENERAL HEALTH: feels well otherwise  SKIN: denies any unusual skin lesions or rashes  RESPIRATORY: denies shortness of breath with  exertion  CARDIOVASCULAR: denies chest pain on exertion  GI: denies abdominal pain and denies heartburn  NEURO: denies headaches    EXAM:   /70   Pulse 70   Temp 99.5 °F (37.5 °C) (Temporal)   Wt 137 lb (62.1 kg)   SpO2 97%   BMI 25.06 kg/m²   GENERAL: well developed, well nourished,in no apparent distress  SKIN: no rashes,no suspicious lesions  HEENT: atraumatic, normocephalic,ears and throat are clear  NECK: supple,no adenopathy,no bruits  LUNGS: clear to auscultation  CARDIO: RRR without murmur  GI: good BS's,no masses, HSM or tenderness  EXTREMITIES: no cyanosis, clubbing or edema    ASSESSMENT AND PLAN:     Encounter Diagnosis   Name Primary?    Spinal stenosis at L4-L5 level Yes   Referred to pain management. 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 She sees neurology at [facility], but there is no cure. She has been in PT.  She has bipolar disorder and treated for this as well, seems to be stable currently.  She is here with her caregiver today.    No orders of the defined types were placed in this encounter.      Meds & Refills for this Visit:  Requested Prescriptions      No prescriptions requested or ordered in this encounter       Imaging & Consults:  OP REFERRAL PAIN MANGEMENT  OP REFERRAL TO [facility]    Follow up as needed.;  The patient indicates understanding of these issues and agrees to the plan.     BACK PAIN/NECK PAIN

## 2024-02-24 ENCOUNTER — TELEPHONE (OUTPATIENT)
Dept: FAMILY MEDICINE CLINIC | Facility: CLINIC | Age: 74
End: 2024-02-24

## 2024-02-24 NOTE — TELEPHONE ENCOUNTER
No she does not need an MRI. He has access to her previous scans and my assessment.     The CT scan is to recheck lung after aspirations and she has nodules as well.     She should have it.

## 2024-02-24 NOTE — TELEPHONE ENCOUNTER
-Patient's  Chente states that patient is getting reminders that she needs a CT scan, advised the only outstaning Ct order is a CT of the chest placed by Dr Bunch on 3/7/23. Dr Dr Burnett want patient to have this done?  -Chente also asked if patient needs to have an MRI of the spine prior to scheduling an appointment with the pain doctor.

## 2024-02-27 ENCOUNTER — OFFICE VISIT (OUTPATIENT)
Dept: PHYSICAL THERAPY | Age: 74
End: 2024-02-27
Attending: INTERNAL MEDICINE
Payer: MEDICARE

## 2024-02-27 PROCEDURE — 97140 MANUAL THERAPY 1/> REGIONS: CPT

## 2024-02-27 PROCEDURE — 97110 THERAPEUTIC EXERCISES: CPT

## 2024-02-27 NOTE — PROGRESS NOTES
Diagnosis:   Left hip pain (M25.552      Low back pain   Gait dysfunction  Falls      Referring Provider: Lex  Date of Evaluation:    2/15/24    Precautions:  Fall Risk Next MD visit:   none scheduled  Date of Surgery: n/a   Insurance Primary/Secondary: MEDICARE / [employer]     #  Visits: Medical Necessity, progress notes every 10 visits            Subjective: Pt states she may have over done her exercises and is very sore in her L hip. He states she feels she is walking better.    Pain: 5/10 currently  L lateral hip      Objective:   All of the below objective measures are from evaluation for reference:  AROM: (* denotes performed with pain)  Hip   Flexion: R 90; L 88*  Extension: R mod limited; L mod limited  Abduction: R 30; L 18*  ER: R 13; L 20  IR: R 20; L 22     TU.3s with rollator     Assessment: Added standing 3 way hip, lateral walking pt tolerates well fatigue most limiting.    Goals:    (to be met in 14 visits)  Pt will improve transversus abdominis recruitment to perform proper isometric contraction without requiring verbal or tactile cuing to promote advancement of therex   Pt will demonstrate good understanding of proper posture and body mechanics to decrease pain and improve spinal safety   Pt will report improved symptom centralization and absence of radicular symptoms for 3 consecutive days to improve function with ADL   Pt will have decreased paraspinal mm tension to tolerate standing >10 minutes for work and home activities   Pt will demonstrate improved core strength to be able to perform isometric push downs with <5/10 pain   Pt will have improved hip AROM Flex to 90 deg and ABD to 22 deg to be able to don/doff shoes and perform car transfers without difficulty   Pt will improve hip ABD and ER strength to 4+/5 to increase ease with standing and walking   Pt will be able to squat to  light objects around the house with <5/10 hip pain   Pt will improve functional hip strength to report  ability to ascend/descend 1 flight of stairs with use of handrail   Pt will demonstrate improved SLS to 2 seconds RONEN to promote safety and decrease risk of falls on uneven surfaces such as grass and gravel   Pt will perform TUG in <27 seconds, with LRAD demonstrating improved gait speed for improved community ambulation  Pt will be independent and compliant with comprehensive HEP to maintain progress achieved in PT     Plan: Address ROM deficits, soft tissue length, balance and trunk control for improved force production to reduce fall risk. Trial tandem walking, 3 way hip with t band  Date: 2/20/2024  TX#: 2/10 Date: 2/22/24                TX#: 3/10 Date: 2/27/24                TX#: 4/10 Date:                 TX#: 5/ Date:   Tx#: 6/   THERE EX: 30 mins  Warm up Nu step 5 mins level 4  Standing gastroc stretch 30 secs 3x BLE incline board   SL hip flexor stretch 30 secs 3x   SL clams 10x2*  Manual piriformis, glut, hamstring 30 sec 3x each LE  Hip ADD ball squeeze 10x 5 sec hold  Hip ABD red band B hook lying 10x2 eccentric*  STS 5x2 hand from table no standing support eccentric sit * 21 in mat table THERE EX: 30 mins  Warm up Nu step 5 mins level 4  Standing gastroc stretch 30 secs 3x BLE incline board   SL hip flexor stretch 30 secs 3x   SL clams 10x2  Manual piriformis, glut, hamstring 30 sec 3x each LE  STS 5x2 hand from table no standing support eccentric sit 21 in mat table  Lateral walking no UE support 14ft each direction THERE EX: 30 mins  SL hip flexor stretch 30 secs 3x   Manual piriformis, glut, hamstring 30 sec 3x each LE  Standing 3 way hip with glut med bias 15x each red band BUE support mod tacitle cues *       MANUAL:  SL L lateral hip and glut 10 mins effleurage   MANUAL:  SL L lateral hip and glut 10 mins effleurage   MANUAL:  SL L lateral hip and glut  and T-L paraspinals 15 mins effleurage       NEURO RE ED: 5 mins  // bars rock step weight shifting  each LE 15x each LE mod demo  NEURO RE  ED:  STS to/from 22 in mat table mod cues for forward weight shift of Upper body 10x            HEP: Denoted with * sit to stand without Leveraging Les on support surface, forward weight shift 10x2, continued HEP   Access Code: Access Code: [email or web address]  URL: [email or web address]  Date: 02/27/2024  Prepared by: Aracelis Solomon    Exercises  - Clamshell  - 1 x daily - 7 x weekly - 2 sets - 10 reps  - Supine Piriformis Stretch with Foot on Ground  - 1 x daily - 7 x weekly - 3 sets - 1 reps - 30 sec hold  - Hooklying Clamshell with Resistance  - 1 x daily - 4 x weekly - 2 sets - 10 reps  - Sit to Stand with Counter Support  - 1 x daily - 7 x weekly - 3 sets - 10 reps  - Side Stepping with Resistance at Thighs and Counter Support  - 1 x daily - 4 x weekly - 3 sets - 10 reps  - Standing 3-Way Leg Reach with Resistance at Ankles and Counter Support  - 1 x daily - 4 x weekly - 2 sets - 15 reps    Charges: 1 manual 2 ther ex       Total Timed Treatment: 45min  Total Treatment Time: 45 min

## 2024-03-02 ENCOUNTER — OFFICE VISIT (OUTPATIENT)
Dept: FAMILY MEDICINE CLINIC | Facility: CLINIC | Age: 74
End: 2024-03-02
Payer: MEDICARE

## 2024-03-02 VITALS
TEMPERATURE: 99 F | SYSTOLIC BLOOD PRESSURE: 130 MMHG | DIASTOLIC BLOOD PRESSURE: 62 MMHG | OXYGEN SATURATION: 93 % | HEART RATE: 67 BPM

## 2024-03-02 DIAGNOSIS — J02.9 SORE THROAT: Primary | ICD-10-CM

## 2024-03-02 DIAGNOSIS — R05.1 ACUTE COUGH: ICD-10-CM

## 2024-03-02 PROCEDURE — 99213 OFFICE O/P EST LOW 20 MIN: CPT

## 2024-03-02 PROCEDURE — 87637 SARSCOV2&INF A&B&RSV AMP PRB: CPT

## 2024-03-02 RX ORDER — BENZONATATE 200 MG/1
200 CAPSULE ORAL 3 TIMES DAILY PRN
Qty: 30 CAPSULE | Refills: 0 | Status: SHIPPED | OUTPATIENT
Start: 2024-03-02 | End: 2024-03-12

## 2024-03-02 RX ORDER — PREDNISONE 20 MG/1
40 TABLET ORAL DAILY
Qty: 10 TABLET | Refills: 0 | Status: SHIPPED | OUTPATIENT
Start: 2024-03-02 | End: 2024-03-07

## 2024-03-02 NOTE — PROGRESS NOTES
HPI:   Leann is a 73 yr. Old female here today with cough and sore throat x 5 days.  Patient denies congestion, fever, n/v/d, shortness of breath. Spouse with similar symptoms.         Current Outpatient Medications   Medication Sig Dispense Refill    predniSONE 20 MG Oral Tab Take 2 tablets (40 mg total) by mouth daily for 5 days. 10 tablet 0    benzonatate 200 MG Oral Cap Take 1 capsule (200 mg total) by mouth 3 (three) times daily as needed for cough. 30 capsule 0    metoprolol tartrate 25 MG Oral Tab TAKE 1 TABLET (25 MG TOTAL) BY MOUTH DAILY. 90 tablet 1    Omeprazole 40 MG Oral Capsule Delayed Release Take 1 capsule (40 mg total) by mouth daily. 90 capsule 0    rOPINIRole 0.5 MG Oral Tab Take 1 tablet (0.5 mg total) by mouth at bedtime. 90 tablet 0    clopidogrel 75 MG Oral Tab Take 1 tablet (75 mg total) by mouth daily. 90 tablet 3    amLODIPine 10 MG Oral Tab TAKE 1 TABLET EVERY DAY 90 tablet 0    simvastatin 20 MG Oral Tab Take 1 tablet (20 mg total) by mouth nightly. 90 tablet 0    clobetasol 0.05 % External Cream Apply 1 Application topically daily.      LORazepam 0.5 MG Oral Tab Take 1 tablet (0.5 mg total) by mouth 2 (two) times daily.      aspirin 81 MG Oral Chew Tab Chew 1 tablet (81 mg total) by mouth daily.      Cyanocobalamin (B-12) 500 MCG Oral Tab Take 1,000 Units by mouth daily.        fluticasone propionate 50 MCG/ACT Nasal Suspension 2 sprays by Each Nare route daily. (Patient not taking: Reported on 11/16/2023)      pantoprazole 40 MG Oral Tab EC Take 40 mg by mouth every morning before breakfast. Granule packet per G-tube daily (Patient not taking: Reported on 11/16/2023)      acetaminophen 325 MG Oral Tab Take 2 tablets (650 mg total) by mouth every 4 (four) hours as needed for Pain. (Patient not taking: Reported on 3/2/2024)      alum-mag hydroxide-simethicone 200-200-20 MG/5ML Oral Suspension Take 30 mL by mouth 4 (four) times daily as needed for Indigestion. (Patient not taking:  Reported on 11/16/2023)      bisacodyl 10 MG Rectal Suppos Place 1 suppository (10 mg total) rectally daily. (Patient not taking: Reported on 11/16/2023)      cycloSPORINE (RESTASIS) 0.05 % Ophthalmic Emulsion Place 1 drop into both eyes 2 (two) times daily. (Patient not taking: Reported on 11/16/2023)      Valbenazine Tosylate (INGREZZA) 40 MG Oral Cap Take by mouth daily. (Patient not taking: Reported on 6/27/2023)        Past Medical History:   Diagnosis Date    Cataract     starting    Closed fracture of transverse process of lumbar vertebra with routine healing 09/05/2017    Contact dermatitis and other eczema, due to unspecified cause 02/08/2000    Depression     Difficult intubation     pt says she has been told to say she has a small mouth/throat    Diverticulosis     GERD (gastroesophageal reflux disease)     EGD 4/2011    Hearing impairment     otolodosclerosis both ears; implant right ear    Hemorrhoids     Herpes zoster 06/08/2015    History of lumbar discectomy 09/25/2014    History of right-sided carotid endarterectomy 07/27/2020    HTN (hypertension)     Hyperlipidemia     Impaired fasting glucose     Irritable bowel     OAB (overactive bladder) 04/08/2015    Osteoarthritis of left hip 09/04/2014    Osteoarthritis of lumbar spine     Psoriasis     Subclavian arterial stenosis (HCC) 01/12/2021    Tardive dyskinesia     Vitamin D deficiency 09/20/2012      Past Surgical History:   Procedure Laterality Date    BACK SURGERY  2007    BACK SURGERY  09/18/2017    kyphoplasty T-12 @ [facility]- Dr Doshi     COLONOSCOPY      2013    HYSTERECTOMY  1989    also tummy tuck    INNER EAR SURGERY PROC UNLISTED Right 1992    MRI compatable wire placed    MASTECTOMY LEFT      has implants- not due to CA    MASTECTOMY RIGHT      has implants-not due to CA    OTHER SURGICAL HISTORY  01/12/2021    Stent subclavian artery Dr. 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   Tobacco Use    Smoking status: Former     Packs/day: 0.00     Years: 30.00     Additional pack years: 0.00     Total pack years: 0.00     Types: Cigarettes     Quit date: 2016     Years since quittin.9    Smokeless tobacco: Never   Vaping Use    Vaping Use: Never used   Substance and Sexual Activity    Alcohol use: Yes     Comment: beer sometimes    Drug use: No    Sexual activity: Yes     Partners: Male   Other Topics Concern    Caffeine Concern No     Comment: 2 cups coffee in AM    Exercise No     Comment: 2x per week         REVIEW OF SYSTEMS:   Review of Systems   Constitutional:  Negative for chills and fever.   HENT:  Positive for postnasal drip, rhinorrhea and sore throat. Negative for congestion, ear pain and sinus pressure.    Eyes: Negative.    Respiratory:  Positive for cough. Negative for chest tightness and shortness of breath.    Cardiovascular: Negative.    Gastrointestinal:  Negative for abdominal pain, diarrhea, nausea and vomiting.   Neurological:  Negative for dizziness, light-headedness and headaches.       EXAM:   /62 (BP Location: Right arm, Patient Position: Sitting, Cuff Size: adult)   Pulse 67   Temp 98.6 °F (37 °C) (Temporal)   SpO2 93%   Physical Exam  Constitutional:       General: She is not in acute distress.     Appearance: Normal appearance. She is not ill-appearing.   HENT:      Head: Atraumatic.      Right Ear: Tympanic membrane, ear canal and external ear normal.      Left Ear: Tympanic membrane, ear canal  and external ear normal.      Nose: Rhinorrhea present.      Mouth/Throat:      Mouth: Mucous membranes are moist.      Pharynx: Posterior oropharyngeal erythema present. No oropharyngeal exudate.   Cardiovascular:      Rate and Rhythm: Normal rate and regular rhythm.      Pulses: Normal pulses.      Heart sounds: Normal heart sounds.   Pulmonary:      Effort: Pulmonary effort is normal.      Breath sounds: Normal breath sounds. No wheezing, rhonchi or rales.   Musculoskeletal:      Cervical back: Neck supple.   Lymphadenopathy:      Cervical: Cervical adenopathy present.   Skin:     General: Skin is warm and dry.   Neurological:      Mental Status: She is alert.         ASSESSMENT AND PLAN:   Diagnoses and all orders for this visit:    Sore throat  -     predniSONE 20 MG Oral Tab; Take 2 tablets (40 mg total) by mouth daily for 5 days.  -     SARS-CoV-2/Flu A and B/RSV by PCR ([facility]) [E]; Future    Acute cough  -     benzonatate 200 MG Oral Cap; Take 1 capsule (200 mg total) by mouth 3 (three) times daily as needed for cough.  -     SARS-CoV-2/Flu A and B/RSV by PCR ([facility]) [E]; Future     -Discussed medications as prescribed.  Recommend over the counter treatment of symptoms.  Respiratory viral panel pending.  -Return in 3 days for symptom worsening, sooner as needed  -Patient verbalized understanding and in agreement with treatment plan.    LAURA Shaver

## 2024-03-03 LAB
FLUAV + FLUBV RNA SPEC NAA+PROBE: NOT DETECTED
FLUAV + FLUBV RNA SPEC NAA+PROBE: NOT DETECTED
RSV RNA SPEC NAA+PROBE: NOT DETECTED
SARS-COV-2 RNA RESP QL NAA+PROBE: DETECTED

## 2024-03-04 ENCOUNTER — TELEPHONE (OUTPATIENT)
Dept: PHYSICAL THERAPY | Facility: HOSPITAL | Age: 74
End: 2024-03-04

## 2024-03-04 ENCOUNTER — TELEPHONE (OUTPATIENT)
Dept: FAMILY MEDICINE CLINIC | Facility: CLINIC | Age: 74
End: 2024-03-04

## 2024-03-04 NOTE — TELEPHONE ENCOUNTER
Patient has therapy tomorrow and a CT scan scheduled later this week. He states he is also sick. He cannot drive her. Advised to reschedule appointments for next week. Patient must be symptoms free and fever free for 24 hours before going out in public.

## 2024-03-07 ENCOUNTER — APPOINTMENT (OUTPATIENT)
Dept: PHYSICAL THERAPY | Age: 74
End: 2024-03-07
Attending: INTERNAL MEDICINE
Payer: MEDICARE

## 2024-03-09 ENCOUNTER — TELEPHONE (OUTPATIENT)
Dept: FAMILY MEDICINE CLINIC | Facility: CLINIC | Age: 74
End: 2024-03-09

## 2024-03-09 NOTE — TELEPHONE ENCOUNTER
Patient's  states that the benzonatate is not helping with her cough. She has a wet cough and states she is having \"difficulty\" breathing when she coughs. She is only coughing during the day. What else can she take?

## 2024-03-09 NOTE — TELEPHONE ENCOUNTER
Appointment scheduled Tuesday at 3pm with Dr Burnett. He states he cannot fine Tylenol cold and Sinus. Advised she can take Advil Cold and Sinus. V.O. Dr Burnett/Vikash PEDRO

## 2024-03-09 NOTE — TELEPHONE ENCOUNTER
Chente advised. He said that she is c/o feeling tired and weak. She is eating and drinking fluids and napping . Advised this is common after Covid. Any other suggestions?

## 2024-03-11 ENCOUNTER — TELEPHONE (OUTPATIENT)
Dept: FAMILY MEDICINE CLINIC | Facility: CLINIC | Age: 74
End: 2024-03-11

## 2024-03-11 NOTE — TELEPHONE ENCOUNTER
Patient's  said that patient went to the hospital Saturday night because she was very SOB. Her oxygen saturation was in the 80's. They found a mass in her lung and are going to do a bronchoscopy.

## 2024-03-11 NOTE — TELEPHONE ENCOUNTER
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     Incidental finding of T5 compression fracture   Plan    Pulmonary medicine evaluating planning for bronchoscopy March 11, 2024  Continue Zosyn  Follow blood cultures         I have reviewed documentation and anticipating a bronchoscopy biopsy soon.

## 2024-03-18 ENCOUNTER — TELEPHONE (OUTPATIENT)
Dept: FAMILY MEDICINE CLINIC | Facility: CLINIC | Age: 74
End: 2024-03-18

## 2024-03-18 NOTE — TELEPHONE ENCOUNTER
Trying to get a smaller wheelchair and [facility] said that they haven't received approval from . Pt  is requesting to speak to Bertha.

## 2024-03-19 NOTE — TELEPHONE ENCOUNTER
Chente said that patient got a wheelchair from [employer] within the last year. He said that the wheelchair she had was too big for her and it also did not fit through the doorways so they sent it back a couple months ago. He does not know the name of the company. They want to get a smaller manual wheelchair (not a transfer chair) with a foot rest and brakes in the front.   Does patient need a designated visit for this.

## 2024-03-19 NOTE — TELEPHONE ENCOUNTER
Did she get a wheel chair from medicare in the last 5 years? If so is it the doorways or does she slid around too much in it?

## 2024-03-20 DIAGNOSIS — E78.5 HYPERLIPIDEMIA, UNSPECIFIED HYPERLIPIDEMIA TYPE: ICD-10-CM

## 2024-03-22 ENCOUNTER — TELEPHONE (OUTPATIENT)
Dept: PHYSICAL THERAPY | Facility: HOSPITAL | Age: 74
End: 2024-03-22

## 2024-03-22 RX ORDER — AMLODIPINE BESYLATE 10 MG/1
10 TABLET ORAL DAILY
Qty: 90 TABLET | Refills: 3 | Status: SHIPPED | OUTPATIENT
Start: 2024-03-22

## 2024-03-22 RX ORDER — SIMVASTATIN 20 MG
20 TABLET ORAL NIGHTLY
Qty: 90 TABLET | Refills: 3 | Status: SHIPPED | OUTPATIENT
Start: 2024-03-22

## 2024-03-22 NOTE — TELEPHONE ENCOUNTER
Last office visit:  9/21/23  Future Appointments   Date Time Provider Department Center   3/27/2024  1:45 PM Anne Burnett MD [facility] [place]   3/28/2024  1:15 PM Evelyn Solomon, PT [facility] [place]   4/2/2024 10:45 AM Evelyn Solomon, PT [facility] [place]   4/4/2024  1:15 PM Evelyn Solomon, PT [facility] [place]   4/9/2024 10:45 AM Evelyn Solomon PT [facility] [place]   4/11/2024  2:00 PM Evelyn Solomon, PT [facility] [place]   4/16/2024 10:45 AM Evelyn Solomon, PT [facility] [place]   4/18/2024  2:00 PM Evelyn Solomon, PT [facility] [place]     SIMVASTATIN 20 MG Oral Tab     Cholesterol Medication Protocol [id number]/20/2024 11:08 AM   Protocol Details Lipid panel within past 12 months    ALT < 80    ALT resulted within past year    In person appointment or virtual visit in the past 12 mos or appointment in next 3 mos      Last filled:  9/20/23  #90 with 0 refills   Last labs:  11/7/23    AMLODIPINE 10 MG Oral Tab     Hypertension Medications Protocol [id number]/20/2024 11:08 AM   Protocol Details CMP or BMP in past 12 months    Last BP reading less than 140/90    In person appointment or virtual visit in the past 12 mos or appointment in next 3 mos    EGFRCR or GFRNAA > 50      Last filled:  9/20/23  #90 with 0 refills   Last labs:  11/7/23

## 2024-03-25 ENCOUNTER — MED REC SCAN ONLY (OUTPATIENT)
Dept: FAMILY MEDICINE CLINIC | Facility: CLINIC | Age: 74
End: 2024-03-25

## 2024-03-25 ENCOUNTER — TELEPHONE (OUTPATIENT)
Dept: FAMILY MEDICINE CLINIC | Facility: CLINIC | Age: 74
End: 2024-03-25

## 2024-03-25 NOTE — TELEPHONE ENCOUNTER
Patient's  said that patient needs to have an MRI of the thoracic and lumber spine for the pain doctor. Can Dr Burnett order this?

## 2024-03-26 ENCOUNTER — APPOINTMENT (OUTPATIENT)
Dept: PHYSICAL THERAPY | Age: 74
End: 2024-03-26
Attending: INTERNAL MEDICINE
Payer: MEDICARE

## 2024-03-27 ENCOUNTER — OFFICE VISIT (OUTPATIENT)
Dept: FAMILY MEDICINE CLINIC | Facility: CLINIC | Age: 74
End: 2024-03-27
Payer: MEDICARE

## 2024-03-27 VITALS
TEMPERATURE: 97 F | SYSTOLIC BLOOD PRESSURE: 122 MMHG | HEART RATE: 71 BPM | BODY MASS INDEX: 24 KG/M2 | RESPIRATION RATE: 16 BRPM | OXYGEN SATURATION: 94 % | DIASTOLIC BLOOD PRESSURE: 62 MMHG | WEIGHT: 129 LBS

## 2024-03-27 DIAGNOSIS — S22.000A COMPRESSION FRACTURE OF BODY OF THORACIC VERTEBRA (HCC): Primary | ICD-10-CM

## 2024-03-27 PROCEDURE — 99214 OFFICE O/P EST MOD 30 MIN: CPT | Performed by: INTERNAL MEDICINE

## 2024-03-27 RX ORDER — OMEPRAZOLE 40 MG/1
40 CAPSULE, DELAYED RELEASE ORAL DAILY
Qty: 90 CAPSULE | Refills: 0 | Status: SHIPPED | OUTPATIENT
Start: 2024-03-27 | End: 2025-03-22

## 2024-03-28 ENCOUNTER — APPOINTMENT (OUTPATIENT)
Dept: PHYSICAL THERAPY | Age: 74
End: 2024-03-28
Attending: INTERNAL MEDICINE
Payer: MEDICARE

## 2024-04-01 ENCOUNTER — TELEPHONE (OUTPATIENT)
Dept: FAMILY MEDICINE CLINIC | Facility: CLINIC | Age: 74
End: 2024-04-01

## 2024-04-01 PROBLEM — J12.82 PNEUMONIA DUE TO COVID-19 VIRUS: Status: ACTIVE | Noted: 2024-04-01

## 2024-04-01 PROBLEM — U07.1 PNEUMONIA DUE TO COVID-19 VIRUS: Status: ACTIVE | Noted: 2024-04-01

## 2024-04-01 RX ORDER — GABAPENTIN 100 MG/1
100 CAPSULE ORAL 3 TIMES DAILY
Qty: 90 CAPSULE | Refills: 0 | Status: SHIPPED | OUTPATIENT
Start: 2024-04-01 | End: 2024-04-01

## 2024-04-01 RX ORDER — GABAPENTIN 100 MG/1
100 CAPSULE ORAL 3 TIMES DAILY
Qty: 90 CAPSULE | Refills: 0 | Status: SHIPPED | OUTPATIENT
Start: 2024-04-01 | End: 2024-05-01

## 2024-04-01 NOTE — TELEPHONE ENCOUNTER
Patient's  Chente said that they received a notification from [facility] to schedule the MRI.  They want to schedule this at [facility]. Order faxed to [facility].  Chente states that patient has been taking Percocet for pain but it is \"way too strong\".   He is asking if Dr Burnett can refill the Norco 5-325mg. He states he will take the Percocet to [facility] to dispose of them.

## 2024-04-01 NOTE — TELEPHONE ENCOUNTER
advised script for Gabapentin was  canceled at [facility] and sent to [facility].

## 2024-04-01 NOTE — TELEPHONE ENCOUNTER
SAW PT & SHE WAS ASPIRATING AFTER DRINKING WATER, WAS COUGHING AFTERWARDS, IS  OK WITH SPEECH THERAPY SEEING HER?

## 2024-04-01 NOTE — TELEPHONE ENCOUNTER
I asked the patient at our last follow up about bulbar symptoms and she denied that she has issues.  If she is willing to do therapy I can order it.

## 2024-04-01 NOTE — PROGRESS NOTES
Leann Chatman is a 73 year old female.  HPI:   Leann Chatman is a 73 y.o. female with h/o COVID-19 infection tested positive on 3/2/2024, anxiety/panic disorder, hypertension , cataract, psoriasis and tardive dyskinesia. She presented to [facility] ER on 3/9/2024 with symptoms of cough, feeling weak and short of breath, and was subsequently transferred to [facility] from 3/9/2024 until 3/15/2024. She was hypoxic on room air to 88% and was supplemented with oxygen. Further workup suggested that she remains positive for COVID-19 infection, CT chest showed right mainstem bronchus obstruction with the right middle and right lower lobe atelectasis and trace pleural effusion with mediastinal right hilar lymphadenopathy.  During hospital stay at [facility] she underwent bronchoscopy on 3/11/2024 with aspiration of thick secretions.  Was discharged home on albuterol inhaler  Overall she is feeling better, completed antibiotics from discharge, still have cough with clear sputum but able to breathe better.   Her therapy at home for back pain is not helping and a CT angiogram in the ER demonstrated a new T5 compression fracture, likely acute.     Current Outpatient Medications   Medication Sig Dispense Refill    Omeprazole 40 MG Oral Capsule Delayed Release Take 1 capsule (40 mg total) by mouth daily. 90 capsule 0    simvastatin 20 MG Oral Tab Take 1 tablet (20 mg total) by mouth nightly. 90 tablet 3    amLODIPine 10 MG Oral Tab Take 1 tablet (10 mg total) by mouth daily. 90 tablet 3    metoprolol tartrate 25 MG Oral Tab TAKE 1 TABLET (25 MG TOTAL) BY MOUTH DAILY. 90 tablet 1    clopidogrel 75 MG Oral Tab Take 1 tablet (75 mg total) by mouth daily. 90 tablet 3    clobetasol 0.05 % External Cream Apply 1 Application topically daily.      fluticasone propionate 50 MCG/ACT Nasal Suspension 2 sprays by Each Nare route daily.      acetaminophen 325 MG Oral Tab Take 2 tablets (650 mg total) by mouth every 4  (four) hours as needed for Pain.      alum-mag hydroxide-simethicone 200-200-20 MG/5ML Oral Suspension Take 30 mL by mouth 4 (four) times daily as needed for Indigestion.      cycloSPORINE (RESTASIS) 0.05 % Ophthalmic Emulsion Place 1 drop into both eyes 2 (two) times daily.      LORazepam 0.5 MG Oral Tab Take 1 tablet (0.5 mg total) by mouth 2 (two) times daily.      Valbenazine Tosylate (INGREZZA) 40 MG Oral Cap Take by mouth daily.      aspirin 81 MG Oral Chew Tab Chew 1 tablet (81 mg total) by mouth daily.      Cyanocobalamin (B-12) 500 MCG Oral Tab Take 1,000 Units by mouth daily.          Past Medical History:   Diagnosis Date    Cataract     starting    Closed fracture of transverse process of lumbar vertebra with routine healing 2017    Contact dermatitis and other eczema, due to unspecified cause 2000    Depression     Difficult intubation     pt says she has been told to say she has a small mouth/throat    Diverticulosis     GERD (gastroesophageal reflux disease)     EGD 2011    Hearing impairment     otolodosclerosis both ears; implant right ear    Hemorrhoids     Herpes zoster 2015    History of lumbar discectomy 2014    History of right-sided carotid endarterectomy 2020    HTN (hypertension)     Hyperlipidemia     Impaired fasting glucose     Irritable bowel     OAB (overactive bladder) 2015    Osteoarthritis of left hip 2014    Osteoarthritis of lumbar spine     Psoriasis     Subclavian arterial stenosis (HCC) 2021    Tardive dyskinesia     Vitamin D deficiency 2012      Social History:  Social History     Socioeconomic History    Marital status:     Number of children: 2   Occupational History    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   Tobacco Use    Smoking status: Former     Packs/day: 0.00     Years: 30.00     Additional pack years: 0.00     Total pack years: 0.00     Types: Cigarettes     Quit date: 2016     Years since quittin.9     Smokeless tobacco: Never   Vaping Use    Vaping Use: Never used   Substance and Sexual Activity    Alcohol use: Yes     Comment: beer sometimes    Drug use: No    Sexual activity: Yes     Partners: Male   Other Topics Concern    Caffeine Concern No     Comment: 2 cups coffee in AM    Exercise No     Comment: 2x per week        REVIEW OF SYSTEMS:   GENERAL HEALTH: feels well otherwise  SKIN: denies any unusual skin lesions or rashes  RESPIRATORY: denies shortness of breath with exertion  CARDIOVASCULAR: denies chest pain on exertion  GI: denies abdominal pain and denies heartburn  NEURO: denies headaches    EXAM:   /62 (BP Location: Right arm, Patient Position: Sitting)   Pulse 71   Temp 97 °F (36.1 °C)   Resp 16   Wt 129 lb (58.5 kg)   SpO2 94%   BMI 23.59 kg/m²   GENERAL: well developed, well nourished,in no apparent distress  SKIN: no rashes,no suspicious lesions  HEENT: atraumatic, normocephalic,ears and throat are clear  NECK: supple,no adenopathy,no bruits  LUNGS: clear to auscultation  CARDIO: RRR without murmur  GI: good BS's,no masses, HSM or tenderness  EXTREMITIES: no cyanosis, clubbing or edema    ASSESSMENT AND PLAN:     Encounter Diagnosis   Name Primary?    Compression fracture of body of thoracic vertebra (HCC) Yes       No orders of the defined types were placed in this encounter.      Meds & Refills for this Visit:  Requested Prescriptions     Signed Prescriptions Disp Refills    Omeprazole 40 MG Oral Capsule Delayed Release 90 capsule 0     Sig: Take 1 capsule (40 mg total) by mouth daily.       Imaging & Consults:  MRI THORACIC+LUMBAR SPINE  (CPT=72146/24358)    Follow up as needed.    The patient indicates understanding of these issues and agrees to the plan.  The patient is asked to return in 1 month.

## 2024-04-02 ENCOUNTER — APPOINTMENT (OUTPATIENT)
Dept: PHYSICAL THERAPY | Age: 74
End: 2024-04-02
Attending: INTERNAL MEDICINE
Payer: MEDICARE

## 2024-04-03 ENCOUNTER — TELEPHONE (OUTPATIENT)
Dept: FAMILY MEDICINE CLINIC | Facility: CLINIC | Age: 74
End: 2024-04-03

## 2024-04-03 DIAGNOSIS — G24.01 DYSKINESIA, TARDIVE: Primary | ICD-10-CM

## 2024-04-03 DIAGNOSIS — G31.9 NEURODEGENERATIVE DISORDER (HCC): ICD-10-CM

## 2024-04-03 DIAGNOSIS — A52.11: ICD-10-CM

## 2024-04-03 NOTE — TELEPHONE ENCOUNTER
Pt daughter called and stated pt \"is a mess\" pt is only offering 5 visits of pt in home and now she will need to do outpatient and she stated that pt is not capable of doing it outpatient, its too hard on her and her . Daughter is wanting to know if  can write a letter to Medicare stating pt needs in home pt. She said without the pt she is going to decline. She lives in [place] and can't be there otherwise she would be.

## 2024-04-04 ENCOUNTER — APPOINTMENT (OUTPATIENT)
Dept: PHYSICAL THERAPY | Age: 74
End: 2024-04-04
Attending: INTERNAL MEDICINE
Payer: MEDICARE

## 2024-04-06 NOTE — TELEPHONE ENCOUNTER
Patient's daughter states that there is no way that patient can get to PT as outpatient, her  cannot take her there.   Advised we will attempt to renew home health for nursing and PT. Order faxed to [facility] [phone].

## 2024-04-08 NOTE — TELEPHONE ENCOUNTER
Daughter advised that the order for nursing and PT was sent to [facility]. They confirmed that they received the order and PT is going out to see patient today.

## 2024-04-09 ENCOUNTER — APPOINTMENT (OUTPATIENT)
Dept: PHYSICAL THERAPY | Age: 74
End: 2024-04-09
Attending: INTERNAL MEDICINE
Payer: MEDICARE

## 2024-04-11 ENCOUNTER — APPOINTMENT (OUTPATIENT)
Dept: PHYSICAL THERAPY | Age: 74
End: 2024-04-11
Attending: INTERNAL MEDICINE
Payer: MEDICARE

## 2024-04-16 ENCOUNTER — APPOINTMENT (OUTPATIENT)
Dept: PHYSICAL THERAPY | Age: 74
End: 2024-04-16
Attending: INTERNAL MEDICINE
Payer: MEDICARE

## 2024-04-18 ENCOUNTER — APPOINTMENT (OUTPATIENT)
Dept: PHYSICAL THERAPY | Age: 74
End: 2024-04-18
Attending: INTERNAL MEDICINE
Payer: MEDICARE

## 2024-04-20 ENCOUNTER — TELEPHONE (OUTPATIENT)
Dept: FAMILY MEDICINE CLINIC | Facility: CLINIC | Age: 74
End: 2024-04-20

## 2024-04-20 NOTE — TELEPHONE ENCOUNTER
MRI shows L4-5 stenosis, you can see a pain specialist if PT is not helping, more aggressive would be to get the opinion of a neurosurgeon.

## 2024-04-20 NOTE — TELEPHONE ENCOUNTER
Patient's  advised. He said she has already seen a pain specialist and is going to get an Epidural at [facility] next wee. She is c/o upper back pain.

## 2024-04-24 ENCOUNTER — HOME HEALTH CHARGES (OUTPATIENT)
Dept: FAMILY MEDICINE CLINIC | Facility: CLINIC | Age: 74
End: 2024-04-24

## 2024-04-24 DIAGNOSIS — J06.9 ACUTE RESPIRATORY DISEASE DUE TO 2019 NOVEL CORONAVIRUS: Primary | ICD-10-CM

## 2024-04-24 DIAGNOSIS — U07.1 ACUTE RESPIRATORY DISEASE DUE TO 2019 NOVEL CORONAVIRUS: Primary | ICD-10-CM

## 2024-04-27 ENCOUNTER — NURSE TRIAGE (OUTPATIENT)
Dept: FAMILY MEDICINE CLINIC | Facility: CLINIC | Age: 74
End: 2024-04-27

## 2024-04-27 NOTE — TELEPHONE ENCOUNTER
Pt has vaginal bleeding periodically over the last couple of days,  said that she has light cramping as well.

## 2024-04-27 NOTE — TELEPHONE ENCOUNTER
Spoke with pt's . Pt having sparadic vaginal bleeding, started 2 days ago. Notes bleeding is on the incontinence pad she wears. Notes she has had 2 episodes of blood on pad, size of a silver dollar. Notes no blood in toilet after using BR. Notes no trauma, no urinary burning/urgency, no constipation, diarrhea.  states they believe it is vaginal bleeding, not urinary or rectal related. Pt otherwise instable condition. Requesting appt with TG, appt given next week.   Future Appointments   Date Time Provider Department Center   4/30/2024  3:30 PM Anne Burnett MD [facility] [place]           Reason for Disposition   Age > 39 years with irregular or excessive bleeding    Answer Assessment - Initial Assessment Questions  1. AMOUNT: \"Describe the bleeding that you are having.\"     - SPOTTING: spotting, or pinkish / brownish mucous discharge; does not fill panty liner or pad     - MILD:  less than 1 pad / hour; less than patient's usual menstrual bleeding    - MODERATE: 1-2 pads / hour; 1 menstrual cup every 6 hours; small-medium blood clots (e.g., pea, grape, small coin)    - SEVERE: soaking 2 or more pads/hour for 2 or more hours; 1 menstrual cup every 2 hours; bleeding not contained by pads or continuous red blood from vagina; large blood clots (e.g., golf ball, large coin)       spotting  2. ONSET: \"When did the bleeding begin?\" \"Is it continuing now?\"      2 dags ago, has happened twice  3. MENSTRUAL PERIOD: \"When was the last normal menstrual period?\" \"How is this different than your period?\"      menopausal  4. REGULARITY: \"How regular are your periods?\"      N/a  5. ABDOMINAL PAIN: \"Do you have any pain?\" \"How bad is the pain?\"  (e.g., Scale 1-10; mild, moderate, or severe)    - MILD (1-3): doesn't interfere with normal activities, abdomen soft and not tender to touch     - MODERATE (4-7): interferes with normal activities or awakens from sleep, abdomen tender to touch     - SEVERE (8-10):  excruciating pain, doubled over, unable to do any normal activities       no  6. PREGNANCY: \"Could you be pregnant?\" \"Are you sexually active?\" \"Did you recently give birth?\"      N/a  7. BREASTFEEDING: \"Are you breastfeeding?\"      N/a  8. HORMONES: \"Are you taking any hormone medications, prescription or OTC?\" (e.g., birth control pills, estrogen)      No  9. BLOOD THINNERS: \"Do you take any blood thinners?\" (e.g., Coumadin/warfarin, Pradaxa/dabigatran, aspirin)      no  10. CAUSE: \"What do you think is causing the bleeding?\" (e.g., recent gyn surgery, recent gyn procedure; known bleeding disorder, cervical cancer, polycystic ovarian disease, fibroids)          unknown  11. HEMODYNAMIC STATUS: \"Are you weak or feeling lightheaded?\" If Yes, ask: \"Can you stand and walk normally?\"         no  12. OTHER SYMPTOMS: \"What other symptoms are you having with the bleeding?\" (e.g., passed tissue, vaginal discharge, fever, menstrual-type cramps)        none    Protocols used: Vaginal Bleeding - [id number]

## 2024-04-30 ENCOUNTER — OFFICE VISIT (OUTPATIENT)
Dept: FAMILY MEDICINE CLINIC | Facility: CLINIC | Age: 74
End: 2024-04-30
Payer: MEDICARE

## 2024-04-30 VITALS
TEMPERATURE: 98 F | OXYGEN SATURATION: 96 % | DIASTOLIC BLOOD PRESSURE: 64 MMHG | HEART RATE: 62 BPM | RESPIRATION RATE: 16 BRPM | SYSTOLIC BLOOD PRESSURE: 112 MMHG

## 2024-04-30 DIAGNOSIS — R31.0 HEMATURIA, GROSS: Primary | ICD-10-CM

## 2024-04-30 DIAGNOSIS — M48.061 SPINAL STENOSIS AT L4-L5 LEVEL: ICD-10-CM
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
## 2024-04-30 DIAGNOSIS — G31.9 NEURODEGENERATIVE DISORDER (HCC): ICD-10-CM

## 2024-04-30 LAB
APPEARANCE: CLEAR
BILIRUBIN: NEGATIVE
GLUCOSE (URINE DIPSTICK): NEGATIVE MG/DL
KETONES (URINE DIPSTICK): NEGATIVE MG/DL
LEUKOCYTES: NEGATIVE
MULTISTIX LOT#: NORMAL NUMERIC
NITRITE, URINE: NEGATIVE
OCCULT BLOOD: NEGATIVE
PH, URINE: 7 (ref 4.5–8)
PROTEIN (URINE DIPSTICK): NEGATIVE MG/DL
SPECIFIC GRAVITY: 1.02 (ref 1–1.03)
URINE-COLOR: YELLOW
UROBILINOGEN,SEMI-QN: 0.2 MG/DL (ref 0–1.9)

## 2024-04-30 PROCEDURE — G2211 COMPLEX E/M VISIT ADD ON: HCPCS | Performed by: INTERNAL MEDICINE

## 2024-04-30 PROCEDURE — 87086 URINE CULTURE/COLONY COUNT: CPT | Performed by: INTERNAL MEDICINE

## 2024-04-30 PROCEDURE — 99214 OFFICE O/P EST MOD 30 MIN: CPT | Performed by: INTERNAL MEDICINE

## 2024-04-30 PROCEDURE — 81003 URINALYSIS AUTO W/O SCOPE: CPT | Performed by: INTERNAL MEDICINE

## 2024-05-02 ENCOUNTER — TELEPHONE (OUTPATIENT)
Dept: FAMILY MEDICINE CLINIC | Facility: CLINIC | Age: 74
End: 2024-05-02

## 2024-05-02 RX ORDER — FLUTICASONE PROPIONATE 50 MCG
2 SPRAY, SUSPENSION (ML) NASAL DAILY
Qty: 1 EACH | Refills: 2 | Status: SHIPPED | OUTPATIENT
Start: 2024-05-02 | End: 2024-05-13

## 2024-05-02 RX ORDER — AMLODIPINE BESYLATE 10 MG/1
10 TABLET ORAL DAILY
Qty: 90 TABLET | Refills: 3 | Status: SHIPPED | OUTPATIENT
Start: 2024-05-02

## 2024-05-02 NOTE — TELEPHONE ENCOUNTER
Patient called needing refill for Flonase and metoprolol to be sent to [facility] in [place].

## 2024-05-04 NOTE — PROGRESS NOTES
Leann Chatman is a 73 year old female.  HPI:   Pt is here because 2 mornings last week she had blood on her incontinence pad, just a touch, but enough that she found it unsettling. She has not had burning on urination, no constipation, no blood in the stool.  She did have a fall that she was seen in the ER for Friday 4/28/24.  Her  fell on hop of her trying to give her a hand up.  She has a large bruise on her right calf.   Current Outpatient Medications   Medication Sig Dispense Refill    fluticasone propionate 50 MCG/ACT Nasal Suspension 2 sprays by Each Nare route daily for 11 days. 1 each 2    amLODIPine 10 MG Oral Tab Take 1 tablet (10 mg total) by mouth daily. 90 tablet 3    Omeprazole 40 MG Oral Capsule Delayed Release Take 1 capsule (40 mg total) by mouth daily. 90 capsule 0    simvastatin 20 MG Oral Tab Take 1 tablet (20 mg total) by mouth nightly. 90 tablet 3    metoprolol tartrate 25 MG Oral Tab TAKE 1 TABLET (25 MG TOTAL) BY MOUTH DAILY. 90 tablet 1    clopidogrel 75 MG Oral Tab Take 1 tablet (75 mg total) by mouth daily. 90 tablet 3    clobetasol 0.05 % External Cream Apply 1 Application topically daily.      fluticasone propionate 50 MCG/ACT Nasal Suspension 2 sprays by Each Nare route daily.      acetaminophen 325 MG Oral Tab Take 2 tablets (650 mg total) by mouth every 4 (four) hours as needed for Pain.      alum-mag hydroxide-simethicone 200-200-20 MG/5ML Oral Suspension Take 30 mL by mouth 4 (four) times daily as needed for Indigestion.      cycloSPORINE (RESTASIS) 0.05 % Ophthalmic Emulsion Place 1 drop into both eyes 2 (two) times daily.      LORazepam 0.5 MG Oral Tab Take 1 tablet (0.5 mg total) by mouth 2 (two) times daily.      Valbenazine Tosylate (INGREZZA) 40 MG Oral Cap Take by mouth daily.      aspirin 81 MG Oral Chew Tab Chew 1 tablet (81 mg total) by mouth daily.      Cyanocobalamin (B-12) 500 MCG Oral Tab Take 1,000 Units by mouth daily.          Past Medical History:     Cataract    starting    Closed fracture of transverse process of lumbar vertebra with routine healing    Contact dermatitis and other eczema, due to unspecified cause    Depression    Difficult intubation    pt says she has been told to say she has a small mouth/throat    Diverticulosis    GERD (gastroesophageal reflux disease)    EGD 2011    Hearing impairment    otolodosclerosis both ears; implant right ear    Hemorrhoids    Herpes zoster    History of lumbar discectomy    History of right-sided carotid endarterectomy    HTN (hypertension)    Hyperlipidemia    Impaired fasting glucose    Irritable bowel    OAB (overactive bladder)    Osteoarthritis of left hip    Osteoarthritis of lumbar spine    Psoriasis    Subclavian arterial stenosis (HCC)    Tardive dyskinesia    Vitamin D deficiency      Social History:  Social History     Socioeconomic History    Marital status:     Number of children: 2   Occupational History    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   Tobacco Use    Smoking status: Former     Current packs/day: 0.00     Types: Cigarettes     Quit date: 1986     Years since quittin.1    Smokeless tobacco: Never   Vaping Use    Vaping status: Never Used   Substance and Sexual Activity    Alcohol use: Yes     Comment: beer sometimes    Drug use: No    Sexual activity: Yes     Partners: Male   Other Topics Concern    Caffeine Concern No     Comment: 2 cups coffee in AM    Exercise No     Comment: 2x per week     Social Determinants of Health     Food Insecurity: High Risk (3/15/2024)    Received from [facility]    Food Insecurity     Have there been times that your food ran out, and you didn't have money to get more?: No     Are there times that you worry that this might happen?: Yes   Transportation Needs: High Risk (3/15/2024)    Received from [facility]    Transportation Needs     Do you  have trouble getting transportation to medical appointments?: Yes     How do you normally get to and from your appointments?: Family/Friend   Physical Activity: Inactive (1/28/2021)    Received from [facility], [facility]    Exercise Vital Sign     Days of Exercise per Week: 0 days     Minutes of Exercise per Session: 0 min   Housing Stability: Low Risk  (3/15/2024)    Received from [facility], [facility]    Housing Stability     Are you concerned about having a safe and reliable place to live?: No        REVIEW OF SYSTEMS:   GENERAL HEALTH: feels well otherwise  SKIN: denies any unusual skin lesions or rashes  RESPIRATORY: denies shortness of breath with exertion  CARDIOVASCULAR: denies chest pain on exertion  GI: denies abdominal pain and denies heartburn  NEURO: denies headaches    EXAM:   /64   Pulse 62   Temp 98 °F (36.7 °C) (Temporal)   Resp 16   SpO2 96%   GENERAL: well developed, well nourished,in no apparent distress  SKIN: no rashes,no suspicious lesions  HEENT: atraumatic, normocephalic,ears and throat are clear  NECK: supple,no adenopathy,no bruits  LUNGS: clear to auscultation  CARDIO: RRR without murmur  GI: good BS's,no masses, HSM or tenderness, rectal , no hemroid or active bleeding  : vaginal atrophy with narrowed introitus without bleeding  EXTREMITIES: no cyanosis, clubbing or edema    ASSESSMENT AND PLAN:     Encounter Diagnosis   Name Primary?    Hematuria, gross Yes   Recent fall, maybe external, send urine for anaylsis, no active bleeding on exam.  Progressive nuerologic problem with weakness and speech difficulty.      Orders Placed This Encounter   Procedures    Urine Dip, auto without Micro    Urine Culture, Routine [E]       Meds & Refills for this Visit:  Requested Prescriptions      No prescriptions requested or ordered in this encounter       Imaging & Consults:  None    Follow up as needed.     The patient  indicates understanding of these issues and agrees to the plan.

## 2024-05-10 ENCOUNTER — TELEPHONE (OUTPATIENT)
Dept: FAMILY MEDICINE CLINIC | Facility: CLINIC | Age: 74
End: 2024-05-10

## 2024-05-10 DIAGNOSIS — M48.061 SPINAL STENOSIS AT L4-L5 LEVEL: Primary | ICD-10-CM

## 2024-05-10 NOTE — TELEPHONE ENCOUNTER
Patient is having hip pain and back pain, would like to speak with nurse. She said that the gabapentin 100 MG Oral Cap isn't helping and she doesn't go back to the Doctor until June 6th.

## 2024-05-10 NOTE — TELEPHONE ENCOUNTER
Spoke with patient who states that she is having hip pain and back pain by her bra area. She states she does see a pain doctor, but does not go back for another injection until 6/6/24. Advised to call her pain doctor's office. In the mean time, this nurse will route this message to Dr. Burnett to review upon her return on Monday. Advised if patient's pain becomes too much, then ER for evaluation. Also advised to rest her back and hip in the sitting position which is most comfortable for her, and apply heat, 20 minutes on and 20 minutes office. She verbalized understanding. Patient states gabapentin not helpful at all. Dr. Burnett, please advise.

## 2024-05-13 RX ORDER — TRAMADOL HYDROCHLORIDE 50 MG/1
50 TABLET ORAL EVERY 6 HOURS PRN
Qty: 30 TABLET | Refills: 1 | Status: SHIPPED | OUTPATIENT
Start: 2024-05-13

## 2024-05-13 NOTE — TELEPHONE ENCOUNTER
Patient said that she can barely walk because her left hip is very painful. She said that her upper back is also very painful. She said that she saw that pain doctor but they could not give her the injections because she is on Plavix.  She is seeing the surgeon June 6th. She said she has been taking Gabapentin 3x daily and Tylenol but it is not helping.

## 2024-05-13 NOTE — TELEPHONE ENCOUNTER
Patient's  states she has never taken Tramadol in the past. She would like to try it but wants to make sure she can take it with her anxiety medication.

## 2024-05-14 ENCOUNTER — TELEPHONE (OUTPATIENT)
Dept: FAMILY MEDICINE CLINIC | Facility: CLINIC | Age: 74
End: 2024-05-14

## 2024-05-14 DIAGNOSIS — M48.061 SPINAL STENOSIS AT L4-L5 LEVEL: ICD-10-CM

## 2024-05-14 RX ORDER — TRAMADOL HYDROCHLORIDE 50 MG/1
50 TABLET ORAL EVERY 6 HOURS PRN
Qty: 30 TABLET | Refills: 1 | Status: SHIPPED | OUTPATIENT
Start: 2024-05-14

## 2024-05-14 NOTE — TELEPHONE ENCOUNTER
traMADol 50 MG Oral Tab  was sent to wrong pharmacy yesterday.  It should go to [facility]

## 2024-05-15 ENCOUNTER — TELEPHONE (OUTPATIENT)
Dept: FAMILY MEDICINE CLINIC | Facility: CLINIC | Age: 74
End: 2024-05-15

## 2024-05-23 RX ORDER — ROPINIROLE 0.5 MG/1
0.5 TABLET, FILM COATED ORAL NIGHTLY
Qty: 90 TABLET | Refills: 0 | Status: SHIPPED | OUTPATIENT
Start: 2024-05-23 | End: 2024-08-21

## 2024-05-23 NOTE — TELEPHONE ENCOUNTER
Patient states she needs Ropinirole refilled to [facility].  Last refill was 2/19/24 #90  Last OV w/Dr Burnett 4/30/24

## 2024-06-13 ENCOUNTER — MED REC SCAN ONLY (OUTPATIENT)
Dept: FAMILY MEDICINE CLINIC | Facility: CLINIC | Age: 74
End: 2024-06-13

## 2024-06-13 ENCOUNTER — HOME HEALTH CHARGES (OUTPATIENT)
Dept: FAMILY MEDICINE CLINIC | Facility: CLINIC | Age: 74
End: 2024-06-13

## 2024-06-13 DIAGNOSIS — S22.050D CLOSED WEDGE COMPRESSION FRACTURE OF T6 VERTEBRA WITH ROUTINE HEALING, SUBSEQUENT ENCOUNTER: Primary | ICD-10-CM

## 2024-06-28 ENCOUNTER — TELEPHONE (OUTPATIENT)
Dept: FAMILY MEDICINE CLINIC | Facility: CLINIC | Age: 74
End: 2024-06-28

## 2024-06-28 NOTE — TELEPHONE ENCOUNTER
Spoke with Eugene (, KENAN on file) and appointment scheduled for:  Future Appointments   Date Time Provider Department Center   7/25/2024  1:30 PM Anne Burnett MD [facility]

## 2024-07-01 NOTE — TELEPHONE ENCOUNTER
Last Ov w/Dr Burnett 4/30/24  Last labs 3/14/24  Last refill 1/10/24  Next appointment scheduled 7/25/24

## 2024-07-10 ENCOUNTER — TELEPHONE (OUTPATIENT)
Dept: FAMILY MEDICINE CLINIC | Facility: CLINIC | Age: 74
End: 2024-07-10

## 2024-07-10 NOTE — TELEPHONE ENCOUNTER
PATIENT'S DAUGHTER CALLING- PATIENT CURRENTLY RECEIVES SERVICES FROM HOME CARE. THEY RECEIVED A LETTER FOR A HEARING TO REMOVE THE SERVICES DUE TO alf FUNDS. DAUGHTER WOULD LIKE A LETTER FROM DR. SHAH STATING PATIENT IS NOT HEALTHY ENOUGH TO HAVE THIS HEARING WHICH WILL BE ON 7/29/24 VIA TELEPHONE.

## 2024-07-11 ENCOUNTER — TELEPHONE (OUTPATIENT)
Dept: FAMILY MEDICINE CLINIC | Facility: CLINIC | Age: 74
End: 2024-07-11

## 2024-07-11 NOTE — TELEPHONE ENCOUNTER
Daughter asking if Dr. Burnett received the paperwork for the upcoming hearing regarding home health services. She states she emailed GIOVANNA Neff the information.

## 2024-07-11 NOTE — TELEPHONE ENCOUNTER
Chrystal said that the hearing about home services. She said the state is trying to stop home services for both Leann and her . She said that they do not qualify for services if they have a combined income of over $15,000.   She is asking if Dr Burnett will write a letter to delay the hearing for both Leann and claudy because Claudy is going through Chemo and Leann is going to have hip surgery.

## 2024-07-15 ENCOUNTER — TELEPHONE (OUTPATIENT)
Dept: FAMILY MEDICINE CLINIC | Facility: CLINIC | Age: 74
End: 2024-07-15

## 2024-07-24 RX ORDER — ESCITALOPRAM OXALATE 20 MG/1
20 TABLET ORAL EVERY 24 HOURS
COMMUNITY

## 2024-07-25 ENCOUNTER — LABORATORY ENCOUNTER (OUTPATIENT)
Dept: LAB | Age: 74
End: 2024-07-25
Attending: INTERNAL MEDICINE
Payer: MEDICARE

## 2024-07-25 ENCOUNTER — OFFICE VISIT (OUTPATIENT)
Dept: FAMILY MEDICINE CLINIC | Facility: CLINIC | Age: 74
End: 2024-07-25
Payer: MEDICARE

## 2024-07-25 VITALS
TEMPERATURE: 98 F | SYSTOLIC BLOOD PRESSURE: 112 MMHG | HEIGHT: 62 IN | BODY MASS INDEX: 23.07 KG/M2 | RESPIRATION RATE: 16 BRPM | WEIGHT: 125.38 LBS | HEART RATE: 72 BPM | DIASTOLIC BLOOD PRESSURE: 66 MMHG | OXYGEN SATURATION: 96 %

## 2024-07-25 DIAGNOSIS — Z01.818 PRE-OP TESTING: Primary | ICD-10-CM

## 2024-07-25 DIAGNOSIS — I10 ESSENTIAL HYPERTENSION: ICD-10-CM

## 2024-07-25 DIAGNOSIS — D69.8 OTHER SPECIFIED HEMORRHAGIC CONDITIONS (HCC): ICD-10-CM

## 2024-07-25 DIAGNOSIS — E78.5 HYPERLIPIDEMIA, UNSPECIFIED HYPERLIPIDEMIA TYPE: ICD-10-CM

## 2024-07-25 DIAGNOSIS — R73.9 HYPERGLYCEMIA: ICD-10-CM

## 2024-07-25 DIAGNOSIS — G31.9 NEURODEGENERATIVE DISORDER (HCC): ICD-10-CM

## 2024-07-25 DIAGNOSIS — J44.0 COPD (CHRONIC OBSTRUCTIVE PULMONARY DISEASE) WITH ACUTE BRONCHITIS (HCC): ICD-10-CM

## 2024-07-25 DIAGNOSIS — J20.9 COPD (CHRONIC OBSTRUCTIVE PULMONARY DISEASE) WITH ACUTE BRONCHITIS (HCC): ICD-10-CM

## 2024-07-25 DIAGNOSIS — M48.061 SPINAL STENOSIS AT L4-L5 LEVEL: ICD-10-CM

## 2024-07-25 DIAGNOSIS — Z01.818 PRE-OP TESTING: ICD-10-CM

## 2024-07-25 DIAGNOSIS — A52.11: ICD-10-CM

## 2024-07-25 DIAGNOSIS — S22.050D CLOSED WEDGE COMPRESSION FRACTURE OF T6 VERTEBRA WITH ROUTINE HEALING, SUBSEQUENT ENCOUNTER: ICD-10-CM

## 2024-07-25 LAB
ALBUMIN SERPL-MCNC: 4.7 G/DL (ref 3.2–4.8)
ALBUMIN/GLOB SERPL: 1.7 {RATIO} (ref 1–2)
ALP LIVER SERPL-CCNC: 78 U/L
ALT SERPL-CCNC: 16 U/L
ANION GAP SERPL CALC-SCNC: 5 MMOL/L (ref 0–18)
ANTIBODY SCREEN: NEGATIVE
APTT PPP: 28.3 SECONDS (ref 23–36)
AST SERPL-CCNC: 18 U/L (ref ?–34)
BASOPHILS # BLD AUTO: 0.05 X10(3) UL (ref 0–0.2)
BASOPHILS NFR BLD AUTO: 0.4 %
BILIRUB SERPL-MCNC: 0.4 MG/DL (ref 0.2–1.1)
BUN BLD-MCNC: 26 MG/DL (ref 9–23)
CALCIUM BLD-MCNC: 9.4 MG/DL (ref 8.7–10.4)
CHLORIDE SERPL-SCNC: 94 MMOL/L (ref 98–112)
CO2 SERPL-SCNC: 30 MMOL/L (ref 21–32)
CREAT BLD-MCNC: 0.69 MG/DL
EGFRCR SERPLBLD CKD-EPI 2021: 90 ML/MIN/1.73M2 (ref 60–?)
EOSINOPHIL # BLD AUTO: 0.06 X10(3) UL (ref 0–0.7)
EOSINOPHIL NFR BLD AUTO: 0.5 %
ERYTHROCYTE [DISTWIDTH] IN BLOOD BY AUTOMATED COUNT: 14.5 %
EST. AVERAGE GLUCOSE BLD GHB EST-MCNC: 111 MG/DL (ref 68–126)
FASTING STATUS PATIENT QL REPORTED: YES
GLOBULIN PLAS-MCNC: 2.7 G/DL (ref 2–3.5)
GLUCOSE BLD-MCNC: 104 MG/DL (ref 70–99)
HBA1C MFR BLD: 5.5 % (ref ?–5.7)
HCT VFR BLD AUTO: 40.9 %
HGB BLD-MCNC: 13.8 G/DL
IMM GRANULOCYTES # BLD AUTO: 0.06 X10(3) UL (ref 0–1)
IMM GRANULOCYTES NFR BLD: 0.5 %
INR BLD: 0.9 (ref 0.8–1.2)
LYMPHOCYTES # BLD AUTO: 2.71 X10(3) UL (ref 1–4)
LYMPHOCYTES NFR BLD AUTO: 22.1 %
MCH RBC QN AUTO: 29.6 PG (ref 26–34)
MCHC RBC AUTO-ENTMCNC: 33.7 G/DL (ref 31–37)
MCV RBC AUTO: 87.6 FL
MONOCYTES # BLD AUTO: 0.86 X10(3) UL (ref 0.1–1)
MONOCYTES NFR BLD AUTO: 7 %
NEUTROPHILS # BLD AUTO: 8.5 X10 (3) UL (ref 1.5–7.7)
NEUTROPHILS # BLD AUTO: 8.5 X10(3) UL (ref 1.5–7.7)
NEUTROPHILS NFR BLD AUTO: 69.5 %
OSMOLALITY SERPL CALC.SUM OF ELEC: 273 MOSM/KG (ref 275–295)
PLATELET # BLD AUTO: 275 10(3)UL (ref 150–450)
POTASSIUM SERPL-SCNC: 4.5 MMOL/L (ref 3.5–5.1)
PROT SERPL-MCNC: 7.4 G/DL (ref 5.7–8.2)
PROTHROMBIN TIME: 12.1 SECONDS (ref 11.6–14.8)
RBC # BLD AUTO: 4.67 X10(6)UL
RH BLOOD TYPE: POSITIVE
SODIUM SERPL-SCNC: 129 MMOL/L (ref 136–145)
WBC # BLD AUTO: 12.2 X10(3) UL (ref 4–11)

## 2024-07-25 PROCEDURE — 85730 THROMBOPLASTIN TIME PARTIAL: CPT

## 2024-07-25 PROCEDURE — 87081 CULTURE SCREEN ONLY: CPT

## 2024-07-25 PROCEDURE — 80053 COMPREHEN METABOLIC PANEL: CPT

## 2024-07-25 PROCEDURE — 99215 OFFICE O/P EST HI 40 MIN: CPT | Performed by: INTERNAL MEDICINE

## 2024-07-25 PROCEDURE — 83036 HEMOGLOBIN GLYCOSYLATED A1C: CPT

## 2024-07-25 PROCEDURE — 86850 RBC ANTIBODY SCREEN: CPT

## 2024-07-25 PROCEDURE — 36415 COLL VENOUS BLD VENIPUNCTURE: CPT

## 2024-07-25 PROCEDURE — 86900 BLOOD TYPING SEROLOGIC ABO: CPT

## 2024-07-25 PROCEDURE — 86901 BLOOD TYPING SEROLOGIC RH(D): CPT

## 2024-07-25 PROCEDURE — 85610 PROTHROMBIN TIME: CPT

## 2024-07-25 PROCEDURE — 85025 COMPLETE CBC W/AUTO DIFF WBC: CPT

## 2024-07-25 PROCEDURE — 93000 ELECTROCARDIOGRAM COMPLETE: CPT | Performed by: INTERNAL MEDICINE

## 2024-07-27 ENCOUNTER — TELEPHONE (OUTPATIENT)
Dept: FAMILY MEDICINE CLINIC | Facility: CLINIC | Age: 74
End: 2024-07-27

## 2024-07-27 DIAGNOSIS — Z01.818 PREOP EXAMINATION: Primary | ICD-10-CM

## 2024-07-27 LAB
ATRIAL RATE: 59 BPM
P AXIS: 70 DEGREES
P-R INTERVAL: 136 MS
Q-T INTERVAL: 442 MS
QRS DURATION: 74 MS
QTC CALCULATION (BEZET): 437 MS
R AXIS: -11 DEGREES
T AXIS: 20 DEGREES
VENTRICULAR RATE: 59 BPM

## 2024-07-27 NOTE — PROGRESS NOTES
Leann Chatman is a 74 year old female who presents for a pre-operative physical exam. Patient is to have left anterior total hip arthroplasty, to be done by Dr. Junior Anaya at [facility]  on 8/19/2024.      HPI:   Pt complains of uncontrolled left hip pain.  She has had 2 back surgeries in her lifetime and has a progressive neurodegenerative disease. She noticed palatal tremor in 2019. In the past 2 years, her voice has become slurred. She attributes some of her speech troubles to various dental procedures she has been getting. She also endorses some degree of dysphagia for 1 year where she would variably choke on solids, liquids, and pills. Balance is also worsening to the point that now she walks with a walker or holding to the walls. Denies any cognitive changes. 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   She has bipolar disorder, HTN, a left subclavian stent and hypercholesterolemia.  Her  currently has metastatic lung cancer and cannot care for her postoperatively.     Current Outpatient Medications   Medication Sig Dispense Refill    escitalopram 10 MG Oral Tab Take 1 tablet (10 mg total) by mouth daily.      Lactobacillus (PROBIOTIC ACIDOPHILUS OR) Take by mouth.      Omega-3 Fatty Acids (FISH OIL CONCENTRATE OR) Take by mouth.      Calcium Carb-Cholecalciferol (CALTRATE BONE HEALTH OR) Take by mouth.      Multiple Vitamins-Minerals (CENTRUM SILVER OR) Take by mouth.      oxyCODONE-Acetaminophen (PERCOCET OR) Take by mouth.      metoprolol tartrate 25 MG Oral Tab Take 1 tablet (25 mg total) by mouth daily. 90 tablet 0    amLODIPine 10 MG Oral Tab Take 1 tablet (10 mg total) by mouth daily. 90 tablet 3    simvastatin 20 MG Oral Tab Take 1 tablet (20 mg total) by mouth nightly. 90 tablet 3    clopidogrel 75 MG Oral Tab Take 1 tablet (75 mg total) by mouth daily. 90 tablet 3    clobetasol 0.05 % External Cream Apply 1 Application topically  daily.      fluticasone propionate 50 MCG/ACT Nasal Suspension 2 sprays by Each Nare route daily.      acetaminophen 325 MG Oral Tab Take 2 tablets (650 mg total) by mouth every 4 (four) hours as needed for Pain.      LORazepam 0.5 MG Oral Tab Take 1 tablet (0.5 mg total) by mouth in the morning, at noon, and at bedtime.      Valbenazine Tosylate (INGREZZA) 40 MG Oral Cap Take by mouth daily.      aspirin 81 MG Oral Chew Tab Chew 1 tablet (81 mg total) by mouth daily.      Cyanocobalamin (B-12) 500 MCG Oral Tab Take 1,000 Units by mouth daily.        traMADol 50 MG Oral Tab Take 1 tablet (50 mg total) by mouth every 6 (six) hours as needed for Pain. (Patient not taking: Reported on 7/24/2024) 30 tablet 1    Omeprazole 40 MG Oral Capsule Delayed Release Take 1 capsule (40 mg total) by mouth daily. (Patient not taking: Reported on 7/24/2024) 90 capsule 0      Allergies:   Allergies   Allergen Reactions    Ace Inhibitors OTHER (SEE COMMENTS)     Can't recall    Diazepam OTHER (SEE COMMENTS)     Can't recall    Keflex HIVES    Lexapro OTHER (SEE COMMENTS)     \"didn't agree with me\"    Cozaar [Losartan] OTHER (SEE COMMENTS)     Watery eyes, blister under eye    Levaquin OTHER (SEE COMMENTS)     Can't recall    Zyrtec [Cetirizine Hcl] OTHER (SEE COMMENTS)     Can't recall    Mastisol Adhesive RASH     If patient has bandaid on too long will break out in rash,  Patient states she is able to wear latex gloves.        Past Medical History:    Anxiety state    Back problem    Spinal Stenosis    Cataract    starting; No Lenses    Closed fracture of transverse process of lumbar vertebra with routine healing    Contact dermatitis and other eczema, due to unspecified cause    Coronary atherosclerosis    Subclavian Stent    Depression    Difficult intubation    pt says she has been told to say she has a small mouth/throat    Diverticulosis    GERD (gastroesophageal reflux disease)    EGD 4/2011    Hearing impairment     otolodosclerosis both ears; implant right ear    Hemorrhoids    Herpes zoster    High blood pressure    High cholesterol    History of lumbar discectomy    History of right-sided carotid endarterectomy    HTN (hypertension)    Hyperlipidemia    Impaired fasting glucose    Irritable bowel    OAB (overactive bladder)    Osteoarthritis    Osteoarthritis of left hip    Osteoarthritis of lumbar spine    Psoriasis    Subclavian arterial stenosis (HCC)    Tardive dyskinesia    Visual impairment    Glasses    Vitamin D deficiency      Past Surgical History:   Procedure Laterality Date    Back surgery      Back surgery  2017    kyphoplasty T-12 @ [facility]- Dr Doshi     Colonoscopy          Hysterectomy      also tummy tuck    Inner ear surgery proc unlisted Right     MRI compatable wire placed    Mastectomy left      has implants- not due to CA    Mastectomy right      has implants-not due to CA    Other surgical history  2021    Stent subclavian artery Dr. 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   Tobacco Use    Smoking status: Former     Current packs/day: 0.00     Types: Cigarettes     Quit date: 1986     Years since quittin.3    Smokeless tobacco: Never   Vaping Use    Vaping status: Never Used   Substance and Sexual Activity    Alcohol use: Yes     Alcohol/week: 1.0 standard drink of alcohol     Types: 1 Cans of beer per week     Comment: beer sometimes     Drug use: No    Sexual activity: Yes     Partners: Male   Other Topics Concern    Caffeine Concern No     Comment: 2 cups coffee in AM    Exercise No     Comment: 2x per week     Social Determinants of Health     Food Insecurity: High Risk (3/15/2024)    Received from [facility]    Food Insecurity     Have there been times that your food ran out, and you didn't have money to get more?: No     Are there times that you worry that this might happen?: Yes   Transportation Needs: High Risk (3/15/2024)    Received from [facility]    Transportation Needs     Do you have trouble getting transportation to medical appointments?: Yes     How do you normally get to and from your appointments?: Family/Friend   Physical Activity: Inactive (1/28/2021)    Received from [facility], [facility]    Exercise Vital Sign     Days of Exercise per Week: 0 days     Minutes of Exercise per Session: 0 min   Housing Stability: Low Risk  (3/15/2024)    Received from [facility]    Housing Stability     Are you concerned about having a safe and reliable place to live?: No      Occ: retired. : yes. Children: yes.   Exercise: none.  Diet: low carb diet     REVIEW OF SYSTEMS:   GENERAL: feels well otherwise  SKIN: denies any unusual skin lesions  EYES:denies blurred vision or double vision  HEENT: denies nasal congestion, sinus pain or ST  LUNGS: denies shortness of breath with exertion  CARDIOVASCULAR: denies chest pain on exertion  GI: denies abdominal pain,denies heartburn  : denies dysuria, vaginal discharge or itching,periods regular denies nocturia or changes in stream  MUSCULOSKELETAL: denies back pain  NEURO: denies headaches  PSYCHE: denies depression or anxiety  HEMATOLOGIC: denies hx of anemia  ENDOCRINE: denies thyroid history  ALL/ASTHMA:  denies hx of allergy or asthma    EXAM:   /66   Pulse 72   Temp 97.9 °F (36.6 °C) (Temporal)   Resp 16   Ht 5' 2\" (1.575 m)   Wt 125 lb 6 oz (56.9 kg)   SpO2 96%   BMI 22.93 kg/m²   GENERAL: well developed, well nourished,in no apparent distress  SKIN: no rashes,no suspicious lesions  HEENT: atraumatic, normocephalic,ears and throat are clear  EYES:PERRLA, EOMI, normal optic disk,conjunctiva are clear  NECK: supple,no adenopathy,no bruits  CHEST: no chest tenderness  BREAST: no dominant or suspicious mass  LUNGS: clear to auscultation  CARDIO: RRR without murmur  GI: good BS's,no masses, HSM or tenderness  : deferred  RECTAL: good rectal tone, prostate shows no masses, stool is OB negative  MUSCULOSKELETAL: back is not tender,FROM of the back  EXTREMITIES: no cyanosis, clubbing or edema  NEURO: Oriented times three,cranial nerves are intact,motor and sensory are grossly intact    ASSESSMENT AND PLAN:   Leann Chatman is a 74 year old female who presents for a pre-operative physical exam. Patient is to have left total hip surgery, to be done by Dr. Anaya at [facility]  on 8/19/2024. Pt has the following conditions: HTN, hypercholesterolemia, left subclavian stent, bipolar disorder, a neurodegenerative disorder, and osteoporosis with history of vertebral fractures. Pt has no significant history of cardiac or pulmonary conditions. She had a stress test with Dr Bran 10/19/2023 and it was NORMAL. No change in EKG in comparison with 2019.  Sodium is low due to necessary use of SSRI Pt is a good surgical candidate and medically cleared. This consult was sent back the referring physician, Dr. Junior Anaya. 7/27/2024  Collected 7/25/2024  2:28 PM       Status: Final result       Dx: Pre-op testing    Specimen Information: Nares; Other   0 Result Notes  MSSA/MRSA Culture No MRSA or Staph aureus(MSSA) Isolated           Resulting Agency: [facility] ([facility])           Specimen Collected: 07/25/24   2:28 PM      Collected 7/25/2024  2:28 PM       Status: Final result       Dx: Other specified hemorrhagic condition...    0 Result Notes      Component  Ref Range & Units 7/25/24  2:28 PM   PT  11.6 - 14.8 seconds 12.1   INR  0.80 - 1.20 0.90      Collected 7/25/2024  2:28 PM       Status: Final result       Dx: Other specified hemorrhagic condition...    0 Result Notes      Component  Ref Range & Units 7/25/24  2:28 PM   PTT  23.0 - 36.0 seconds 28.3   [facility] ([facility]              Specimen Collected: 07/25/24  2:28 PM Last Resulted: 07/25/24 10:50 PM        Collected 7/25/2024  2:28 PM       Status: Final result       Dx: Essential hypertension    0 Result Notes      Component  Ref Range & Units 7/25/24  2:28 PM   WBC  4.0 - 11.0 x10(3) uL 12.2 High    RBC  3.80 - 5.30 x10(6)uL 4.67   HGB  12.0 - 16.0 g/dL 13.8   HCT  35.0 - 48.0 % 40.9   PLT  150.0 - 450.0 10(3)uL 275.0   MCV  80.0 - 100.0 fL 87.6   MCH  26.0 - 34.0 pg 29.6   MCHC  31.0 - 37.0 g/dL 33.7   RDW  % 14.5   Neutrophil Absolute Prelim  1.50 - 7.70 x10 (3) uL 8.50 High    Neutrophil Absolute  1.50 - 7.70 x10(3) uL 8.50 High    Lymphocyte Absolute  1.00 - 4.00 x10(3) uL 2.71   Monocyte Absolute  0.10 - 1.00 x10(3) uL 0.86   Eosinophil Absolute  0.00 - 0.70 x10(3) uL 0.06   Basophil Absolute  0.00 - 0.20 x10(3) uL 0.05   Immature Granulocyte Absolute  0.00 - 1.00 x10(3) uL 0.06   Neutrophil %  % 69.5   Lymphocyte %  % 22.1   Monocyte %  % 7.0   Eosinophil %  % 0.5   Basophil %  % 0.4   Immature Granulocyte %  % 0.5   [facility]              Specimen Collected: 07/25/24  2:28 PM Last Resulted: 07/25/24  9:43 PM        Collected 7/25/2024  2:28 PM       Status: Final result       Dx: Essential hypertension    0 Result Notes      Component  Ref Range & Units 7/25/24  2:28 PM   Glucose  70 - 99 mg/dL 104 High    Sodium  136 - 145 mmol/L 129 Low    Potassium  3.5 - 5.1 mmol/L 4.5   Chloride  98 - 112 mmol/L 94 Low     CO2  21.0 - 32.0 mmol/L 30.0   Anion Gap  0 - 18 mmol/L 5   BUN  9 - 23 mg/dL 26 High    Creatinine  0.55 - 1.02 mg/dL 0.69   Calcium, Total  8.7 - 10.4 mg/dL 9.4   Calculated Osmolality  275 - 295 mOsm/kg 273 Low    eGFR-Cr  >=60 mL/min/1.73m2 90   AST  <34 U/L 18   ALT  10 - 49 U/L 16   Alkaline Phosphatase  55 - 142 U/L 78   Bilirubin, Total  0.2 - 1.1 mg/dL 0.4   Total Protein  5.7 - 8.2 g/dL 7.4   Albumin  3.2 - 4.8 g/dL 4.7   Globulin  2.0 - 3.5 g/dL 2.7   A/G Ratio  1.0 - 2.0 1.7   Patient Fasting for CMP? Yes   Resulting [email or web address] (Critical access hospital)              Specimen Collected: 07/25/24  2:28 PM Last Resulted: 07/25/24 10:53 PM      Collected 7/25/2024  2:28 PM       Status: Final result       Dx: Hyperglycemia    0 Result Notes      Component  Ref Range & Units 7/25/24  2:28 PM   HgbA1C  <5.7 % 5.5   Comment:  Normal HbA1C:     <5.7%   Pre-Diabetic:     5.7 - 6.4%   Diabetic:         >6.4%   Diabetic Control: <7.0%     Estimated Average Glucose  68 - 126 mg/dL 111   Comment: eAG is the estimated average glucose calculated from Hgb A1c according to the formula recommended by the American Diabetes Association. eAG levels reflect the long term average glucose and may not correlate with random or fasting glucose levels since these represent specific points in time.   Resulting [email or web address] (Critical access hospital)              Specimen Collected: 07/25/24  2:28 PM Last Resulted: 07/25/24  9:54 PM        rocedure Abnormality Status   ABORH (Blood Type)  Final result   Antibody Screen  Final result     Type and screen  Order: [id number]   Collected 7/25/2024  2:28 PM       Status: Final result       Dx: Pre-op testing    0 Result Notes        Specimen Collected: 07/25/24  2:28 PM Last Resulted: 07/25/24 10:29 PM

## 2024-07-29 NOTE — TELEPHONE ENCOUNTER
Chente advised. He will bring Leann's urine sample in when he comes in for his nurse appointment Thursday.

## 2024-07-31 ENCOUNTER — LABORATORY ENCOUNTER (OUTPATIENT)
Dept: LAB | Facility: HOSPITAL | Age: 74
End: 2024-07-31
Payer: MEDICARE

## 2024-07-31 DIAGNOSIS — Z01.818 PREOP EXAMINATION: ICD-10-CM

## 2024-07-31 LAB
BILIRUB UR QL STRIP.AUTO: NEGATIVE
CLARITY UR REFRACT.AUTO: CLEAR
COLOR UR AUTO: YELLOW
GLUCOSE UR STRIP.AUTO-MCNC: NORMAL MG/DL
KETONES UR STRIP.AUTO-MCNC: NEGATIVE MG/DL
LEUKOCYTE ESTERASE UR QL STRIP.AUTO: NEGATIVE
NITRITE UR QL STRIP.AUTO: NEGATIVE
PH UR STRIP.AUTO: 5.5 [PH] (ref 5–8)
PROT UR STRIP.AUTO-MCNC: NEGATIVE MG/DL
RBC UR QL AUTO: NEGATIVE
SP GR UR STRIP.AUTO: 1.02 (ref 1–1.03)
UROBILINOGEN UR STRIP.AUTO-MCNC: NORMAL MG/DL

## 2024-07-31 PROCEDURE — 81003 URINALYSIS AUTO W/O SCOPE: CPT

## 2024-08-05 ENCOUNTER — ANESTHESIA EVENT (OUTPATIENT)
Dept: SURGERY | Facility: HOSPITAL | Age: 74
End: 2024-08-05
Payer: MEDICARE

## 2024-08-09 NOTE — H&P (VIEW-ONLY)
Patient ID: Leann Chatman     Chief Complaint:    Left hip pain    HPI:  HPI:   Leann Chatman is a 73 year old female who presents today for evaluation of their left hip pain. Patient states pain started 2-3 yrs ago. Pain is constant and described as sharp, is worse with weight bearing, and inability to walk. Pain is located in the groin and radiates distally. Factors that aggravate symptoms include sleeping, weight bearing, sitting for prolonged periods of time.  Patient denies numbness, tingling, or radicular symptoms. Patient ambulates with a walker at home but comes to her appointment in a wheelchair.  Patient states that the symptoms are getting progressively worse.  The pain is affecting their quality of life, ability to sleep at night, ability to perform activities and daily living and to ambulate.  Pain with walking and stair climbing.  They report they do walk with a limp.   Patient does note stiffness in the hip. Pain has continued and here to discuss surgical options.       Physical Exam:     Vital Signs:  There were no vitals taken for this visit.    Past Medical History:   Diagnosis Date   • Cataract     starting   • Closed fracture of transverse process of lumbar vertebra with routine healing 9/5/2017   • Contact dermatitis and other eczema, due to unspecified cause 2/8/2000   • Depression    • Difficult intubation     pt says she has been told to say she has a small mouth/throat   • Diverticulosis    • GERD (gastroesophageal reflux disease)     EGD 4/2011   • Hearing impairment     otolodosclerosis both ears; implant right ear   • Hemorrhoids    • Herpes zoster 6/8/2015   • History of lumbar discectomy 9/25/2014   • History of right-sided carotid endarterectomy 7/27/2020   • HTN (hypertension)    • Hyperlipidemia    • Impaired fasting glucose    • Irritable bowel    • OAB (overactive bladder) 4/8/2015   • Osteoarthritis of left hip 9/4/2014   • Osteoarthritis of lumbar spine    • Psoriasis    •  Subclavian arterial stenosis (HCC) 1/12/2021   • Vitamin D deficiency 9/20/2012     Past Surgical History:   Procedure Laterality Date   • BACK SURGERY  2007   • BACK SURGERY  09/18/2017    kyphoplasty T-12 @ [facility]- Dr Doshi    • COLONOSCOPY      2013   • HYSTERECTOMY  1989    also tumjamila gonzalesamalia   • INNER EAR SURGERY PROC UNLISTED Right 1992    MRI compatable wire placed   • MASTECTOMY LEFT      has implants- not due to CA   • MASTECTOMY RIGHT      has implants-not due to CA   • OTHER SURGICAL HISTORY  01/12/2021    Stent subclavian artery Dr. Paulino     Current Outpatient Medications   Medication Sig Dispense Refill   • ibuprofen 800 MG Oral Tab Take one tablet (800mg) up to three times daily with food. 60 tablet 0   • HYDROcodone-acetaminophen 5-325 MG Oral Tab Take 1 tablet by mouth every 8 (eight) hours as needed for Pain. After three days, decrease to 1 tablet by mouth every 12 hours as needed for pain, then discontinue. 30 tablet 0   • amLODIPine 10 MG Oral Tab Take 1 tablet (10 mg total) by mouth daily. 90 tablet 1   • SIMVASTATIN 20 MG Oral Tab TAKE 1 TABLET EVERY NIGHT 90 tablet 1   • carbidopa-levodopa  MG Oral Tab Take 1 tablet by mouth 3 (three) times daily. 21 tablet 0   • sertraline 25 MG Oral Tab Take 25 mg by mouth daily.     • Metoprolol Succinate ER 25 MG Oral Tablet 24 Hr Take 0.5 tablets (12.5 mg total) by mouth once daily. 90 tablet 0   • Pantoprazole Sodium 40 MG Oral Tab EC Take 1 tablet (40 mg total) by mouth every morning before breakfast. (Patient taking differently: Take 40 mg by mouth 2 (two) times daily before meals.) 90 tablet 3   • fluticasone-salmeterol 250-50 MCG/DOSE Inhalation Aerosol Powder, Breath Activated Inhale 1 puff into the lungs 2 (two) times a day. (Patient taking differently: Inhale 1 puff into the lungs daily as needed.) 1 each 1   • Clopidogrel Bisulfate 75 MG Oral Tab Take 75 mg by mouth daily.     • Multiple Vitamins-Minerals (CENTRUM SILVER ULTRA WOMENS)  Oral Tab Take 1 tablet by mouth daily.     • Clobetasol Propionate (TEMOVATE) 0.05 % Apply Externally Cream Apply topically as needed.       • Vitamin D3 (VITAMIN D3) 2000 UNITS Oral Cap Take by mouth daily.       • thioTHIXene (NAVANE) 1 MG Oral Cap Take 1 capsule by mouth 3 (three) times daily. (Patient taking differently: Take 1 mg by mouth daily.) 36 capsule 0   • Omega-3 Fatty Acids (FISH OIL) 1200 MG Oral Cap Take by mouth 2 (two) times daily.       • aspirin 81 MG Oral Chew Tab Chew 81 mg by mouth daily.     • Cyanocobalamin (B-12) 500 MCG Oral Tab Take 1,000 Units by mouth daily.           Ace Inhibitors          OTHER (SEE COMMENTS)    Comment:Can't recall  Cephalexin              HIVES  Diazepam                OTHER (SEE COMMENTS)  Escitalopram Oxalate    OTHER (SEE COMMENTS)    Comment:\"didn't agree with me\"  Cozaar [Losartan]       OTHER (SEE COMMENTS)    Comment:Watery eyes, blister under eye  Levaquin                OTHER (SEE COMMENTS)    Comment:Can't recall  Zyrtec [Cetirizine *    OTHER (SEE COMMENTS)    Comment:Can't recall  Wound Dressing Adhe*    RASH    Comment:If patient has bandaid on too long will break out             in rash,  Patient states she is able to wear latex             gloves.  Social History    Tobacco Use      Smoking status: Former        Packs/day: 0.00        Years: 1.5 packs/day for 30.0 years (45.0 ttl pk-yrs)        Types: Cigarettes        Start date: 1986        Quit date: 2016        Years since quittin.3      Smokeless tobacco: Never    Alcohol use: Yes      Alcohol/week: 7.0 standard drinks of alcohol      Types: 7 Cans of beer per week      Comment: 1-2 beers/daily    Family History   Problem Relation Age of Onset   • Hypertension Father    • Other (Other) Father         LEUKEMIA   • Hypertension Mother    • Breast Cancer Mother    • Other (Other) Mother         COPD   • Hypertension Brother    • Other (Other) Brother    • Diabetes Maternal Aunt    •  Ovarian Cancer Paternal Aunt    • Diabetes Maternal Uncle        ROS:     All other pertinent positives and negatives as noted above in HPI.    Physical Exam  Vital Signs:  There were no vitals taken for this visit.  Estimated body mass index is 22.68 kg/m² as calculated from the following:    Height as of 6/4/24: 5' 2\" (1.575 m).    Weight as of 6/4/24: 124 lb (56.2 kg).    antalgic gait  with assistive device    Right hip: Limited internal (10 degrees) and external rotation (25 degrees) with pain in the groin region, Limited abduction (0-15/20) degrees, adduction (15 degrees) and flexion ( -5-95 degrees)         5/5 strength  5/5 knee flexion and extension  5/5 ankle dorsiflexion and plantarflexion  Sensation grossly intact in thigh, leg and foot  Negative Trendelenburg sign  Negative Stinchfield sign  2+ pedal pulses and brisk capillary refill  No skin rashes or lesion noted    Left hip: Limited internal (5 degrees) and external rotation (15 degrees) with pain in the groin region, Limited abduction (0-15/20) degrees, adduction (15 degrees) and flexion ( -5-95 degrees)      5/5 strength  5/5 knee flexion and extension  5/5 ankle dorsiflexion and plantarflexion  Sensation grossly intact in thigh, leg and foot  Negative Trendelenburg sign  Negative Stinchfield sign  2+ pedal pulses and brisk capillary refill  No skin rashes or lesions noted    Back:  Deformity: no  Pelvic obliquity: no  Tenderness: no        Radiographs:    Imaging was personally and individually reviewed and discussed at length with the patient:      Two views of the left hip(s) were reviewed in the office today, including AP pelvis and lateral views.  There is severe joint space narrowing (bone-on-bone) with large osteophyte formation off the acetabulum and the head neck junction.  There is sclerosis noted in the femoral head and acetabulum. There is no fracture or dislocation.  No periosteal reactions or medullary lesions are seen.       07/25/2024  Hgb: 13.8  Alb: 4.7  Cr: 0.69  GFR: 90  MRSA/MSSA: Negative  A1C: 5.5      Assessment:     left hip osteoarthritis            Plan:       Continuation of conservative management vs. BOLIVAR discussed.  The patient wishes to proceed with left total hip replacement.      One or more of the conservative treatments have been tried and failed for three months or more except in special circumstances where delay of definitive care is not appropriate: non steroid anti-infammatory medication(s), physical therapy, and corticosteroid injection(s)      Risk and benefits of surgery were reviewed.  Including, but not limited to, blood clots, anesthesia risk, infection, leg length discrepancy, failure of the implant, need for future surgeries, continued pain, hematoma, need for transfusion, and death, among others.  The patient understands and wishes to proceed.     The spectrum of treatment options were discussed with the patient in detail including both the nonoperative and operative treatment modalities and their respective risks and benefits.  After thorough discussion, the patient has elected to undergo surgical treatment.  The details of the surgical procedure were explained including the location of probable incisions and a description of the likely implants to be used.  Models and diagrams were used as educational resources. The patient understands the likely convalescence after surgery, as well as the rehabilitation required.  We thoroughly discussed the risks, benefits, and alternatives to surgery.  The risks include but are not limited to the risk of infection, joint stiffness, blood clots (including DVT and/or pulmonary embolus along with the risk of death), neurologic and/or vascular injury, fracture, dislocation, nonunion, malunion, need for further surgery including hardware failure requiring revision, and continued pain.  It was explained that if tissue has been repaired or reconstructed, there is also  a chance of failure which may require further management.  In addition, we have discussed the risks, including the risk of disease transmission, associated with any allograft tissue which may be utilized.  Following the completion of the discussion, the patient expressed understanding of this planned course of care, all their questions were answered and consent will be obtained preoperatively.    The risks, benefits and alternatives of surgery were discussed with the patient including, but not limited to:   You may be allergic to the medicine that you get during surgery.   The nerves or tendons around your hip may be hurt during surgery.   You may bleed more than expected, requiring blood transfusion.  You may get an infection which will require additional surgery and possibly removal of all implants to cure. Patients with diabetes or who are on certain medications to suppress the immune system are particularly at risk for infection. Patients over 40 BMI are at significant risk for infection and wound healing problems.  You could have a fracture during surgery, or within several weeks after surgery. Patients with osteopenia or osteoporosis are at increased risk for fracture during and after surgery.  You may have problems with your heart and/or kidneys. Patients with history of heart disease are at increased risk for cardiac complications and some of the medications used during and after surgery may affect kidney function.  After surgery, your hip may be stiff and painful. Your hip and knee may swell. This usually lasts about 6 weeks and may be permanent.  Your legs may not be the same length.   Your implant may get loose or move out of place causing pain.  The implant may get worn out over time and need to be replaced.   After having surgery, you may lose your balance and be more likely to fall for a time.   You can dislocate your hip which would require an additional procedure and potentially surgery to  correct.  You may get a blood clot in your leg or arm. This can cause pain and swelling, and it can stop blood from flowing where it needs to go in your body. The blood clot can break loose and travel to your lungs or brain. A blood clot in your lungs can cause chest pain, trouble breathing and possibly death. A blood clot in your brain can cause a stroke. These problems can be life-threatening.       Pain medication discussed.    L BOLIVAR at [facility] obs    no smoking  no diabetes - Last A1c value was 5.6% done 7/12/2021.  BMI - 22  no WOO  no hx of infections/MRSA/dental/joint  no hx of blood clots   Yes plavix as blood thinner  yes lumbar surgery - no fusion   Allergy to keflex - hives  No Cardiac history  No Pulmonary history       - risks, benefits and alternatives discussed  - labs reviewed and meds given   - proceed with left total hip arthroplasty      Diagnoses and all orders for this visit:    Status post left hip replacement

## 2024-08-10 ENCOUNTER — TELEPHONE (OUTPATIENT)
Dept: FAMILY MEDICINE CLINIC | Facility: CLINIC | Age: 74
End: 2024-08-10

## 2024-08-10 NOTE — TELEPHONE ENCOUNTER
Chente states that patient is having surgery on 8/19/24. They were told to stop taking Plavix and Baby ASA on Monday. He asked if it is ok to continue all other medications as well as Caltrate, B12, Fish Oil, and Vit D 3.

## 2024-08-13 ENCOUNTER — TELEPHONE (OUTPATIENT)
Dept: FAMILY MEDICINE CLINIC | Facility: CLINIC | Age: 74
End: 2024-08-13

## 2024-08-13 NOTE — TELEPHONE ENCOUNTER
Chente advised that patient's daughter is going to call the surgeon's office to see what the plan is after discharge. Chente said that they gave him a list of places she could go after surgery for rehab and [place] the [place] He would like her to be close to home.

## 2024-08-13 NOTE — TELEPHONE ENCOUNTER
Daughter Chrystal is on release. She said that patient has been drooling, having trouble eating and swallowing. Patient is blaming it on dental work. Chrystal asked if Dr Burnett can prescribe something to dry up the secretions like a Scopalamine Patch.   Patient is having surgery next Monday. Chrystal said she will be there to discuss discharge planning.

## 2024-08-13 NOTE — TELEPHONE ENCOUNTER
Patient is having hip surgery next week.  She will need therapy afterwards.  He wanted to discuss options.

## 2024-08-13 NOTE — TELEPHONE ENCOUNTER
advised. He said that he wants to know what options they have for outpatient physical therapy.  advised that [facility] does have physical therapy herein [place]. They can discuss this with the discharge planner after surgery.

## 2024-08-13 NOTE — TELEPHONE ENCOUNTER
This is an exacerbation of her progressive neurodegenerative disorder. I don't want her taking scopolamine before surgery it can cause lightheadedness, agitation, hallucinations, decreased urination and trouble swallowing.  I don't know how her body will react and any of these would hold up her surgery.

## 2024-08-14 NOTE — TELEPHONE ENCOUNTER
Chrystal advised. She said that she had a test done and dose not have a neurodegenerative disorder. Advised to make an appointment with dr Burnett after surgery. Daughter agrees to this plan.

## 2024-08-14 NOTE — TELEPHONE ENCOUNTER
Ganglioside GQ1b Ab (IgG), EIA  titer <1:100 Reference Range:  LESS THAN 1:100    This test was developed and its analytical performance  characteristics have been determined by [facility]. It has not been  cleared or approved by FDA. This assay has been validated  pursuant to the CLIA regulations and is used for clinical  purposes.   This is for acute ataxia neuropathy with ophthalomoplegia or Payan-Sarmiento syndrome.

## 2024-08-19 ENCOUNTER — ANESTHESIA (OUTPATIENT)
Dept: SURGERY | Facility: HOSPITAL | Age: 74
End: 2024-08-19
Payer: MEDICARE

## 2024-08-19 ENCOUNTER — APPOINTMENT (OUTPATIENT)
Dept: GENERAL RADIOLOGY | Facility: HOSPITAL | Age: 74
End: 2024-08-19
Attending: ORTHOPAEDIC SURGERY
Payer: MEDICARE

## 2024-08-19 ENCOUNTER — HOSPITAL ENCOUNTER (INPATIENT)
Facility: HOSPITAL | Age: 74
LOS: 1 days | Discharge: NURSING, REHAB OR LONG-TERM CARE | End: 2024-08-21
Attending: ORTHOPAEDIC SURGERY | Admitting: ORTHOPAEDIC SURGERY
Payer: MEDICARE

## 2024-08-19 DIAGNOSIS — F41.9 ANXIETY: ICD-10-CM

## 2024-08-19 DIAGNOSIS — Z01.818 PRE-OP TESTING: Primary | ICD-10-CM

## 2024-08-19 DIAGNOSIS — M16.12 PRIMARY OSTEOARTHRITIS OF LEFT HIP: ICD-10-CM

## 2024-08-19 DIAGNOSIS — M89.8X5 PAIN OF LEFT FEMUR: ICD-10-CM

## 2024-08-19 PROCEDURE — 99223 1ST HOSP IP/OBS HIGH 75: CPT | Performed by: INTERNAL MEDICINE

## 2024-08-19 PROCEDURE — 0SRB049 REPLACEMENT OF LEFT HIP JOINT WITH CERAMIC ON POLYETHYLENE SYNTHETIC SUBSTITUTE, CEMENTED, OPEN APPROACH: ICD-10-PCS | Performed by: ORTHOPAEDIC SURGERY

## 2024-08-19 PROCEDURE — 76000 FLUOROSCOPY <1 HR PHYS/QHP: CPT | Performed by: ORTHOPAEDIC SURGERY

## 2024-08-19 PROCEDURE — 73501 X-RAY EXAM HIP UNI 1 VIEW: CPT | Performed by: ORTHOPAEDIC SURGERY

## 2024-08-19 DEVICE — IMPLANTABLE DEVICE
Type: IMPLANTABLE DEVICE | Site: HIP | Status: FUNCTIONAL
Brand: REFOBACIN® BONE CEMENT R

## 2024-08-19 DEVICE — BIOLOX® DELTA, CERAMIC FEMORAL HEAD, S, Ø 32/-3.5, TAPER 12/14
Type: IMPLANTABLE DEVICE | Site: HIP | Status: FUNCTIONAL
Brand: BIOLOX® DELTA

## 2024-08-19 DEVICE — IMPLANTABLE DEVICE
Type: IMPLANTABLE DEVICE | Site: HIP | Status: FUNCTIONAL
Brand: BIOMET® BONE CEMENT R

## 2024-08-19 DEVICE — IMPLANTABLE DEVICE
Type: IMPLANTABLE DEVICE | Site: HIP | Status: FUNCTIONAL
Brand: AVENIR® MÜLLER

## 2024-08-19 DEVICE — IMPLANTABLE DEVICE
Type: IMPLANTABLE DEVICE | Site: HIP | Status: FUNCTIONAL
Brand: G7® ACETABULAR SYSTEM

## 2024-08-19 DEVICE — IMPLANTABLE DEVICE: Type: IMPLANTABLE DEVICE | Status: FUNCTIONAL

## 2024-08-19 DEVICE — IMPLANTABLE DEVICE
Type: IMPLANTABLE DEVICE | Site: HIP | Status: FUNCTIONAL
Brand: G7® VIVACIT-E®

## 2024-08-19 RX ORDER — OXYCODONE HYDROCHLORIDE 5 MG/1
TABLET ORAL EVERY 4 HOURS PRN
Status: ON HOLD | COMMUNITY
Start: 2024-08-09 | End: 2024-08-21

## 2024-08-19 RX ORDER — AMOXICILLIN 250 MG
1 CAPSULE ORAL DAILY
COMMUNITY
Start: 2024-08-09

## 2024-08-19 RX ORDER — HYDROMORPHONE HYDROCHLORIDE 1 MG/ML
0.2 INJECTION, SOLUTION INTRAMUSCULAR; INTRAVENOUS; SUBCUTANEOUS EVERY 2 HOUR PRN
Status: DISCONTINUED | OUTPATIENT
Start: 2024-08-19 | End: 2024-08-21

## 2024-08-19 RX ORDER — POLYETHYLENE GLYCOL 3350 17 G/17G
17 POWDER, FOR SOLUTION ORAL DAILY PRN
Status: DISCONTINUED | OUTPATIENT
Start: 2024-08-19 | End: 2024-08-21

## 2024-08-19 RX ORDER — ACETAMINOPHEN 325 MG/1
TABLET ORAL
Status: COMPLETED
Start: 2024-08-19 | End: 2024-08-19

## 2024-08-19 RX ORDER — LORAZEPAM 1 MG/1
0.5 TABLET ORAL 3 TIMES DAILY
Status: DISCONTINUED | OUTPATIENT
Start: 2024-08-19 | End: 2024-08-21

## 2024-08-19 RX ORDER — ASPIRIN 81 MG/1
81 TABLET ORAL 2 TIMES DAILY
Status: DISCONTINUED | OUTPATIENT
Start: 2024-08-19 | End: 2024-08-21

## 2024-08-19 RX ORDER — DOCUSATE SODIUM 100 MG/1
100 CAPSULE, LIQUID FILLED ORAL 2 TIMES DAILY
Status: DISCONTINUED | OUTPATIENT
Start: 2024-08-19 | End: 2024-08-21

## 2024-08-19 RX ORDER — DEXAMETHASONE SODIUM PHOSPHATE 10 MG/ML
8 INJECTION, SOLUTION INTRAMUSCULAR; INTRAVENOUS ONCE
Status: COMPLETED | OUTPATIENT
Start: 2024-08-20 | End: 2024-08-20

## 2024-08-19 RX ORDER — DIPHENHYDRAMINE HYDROCHLORIDE 50 MG/ML
25 INJECTION INTRAMUSCULAR; INTRAVENOUS ONCE AS NEEDED
Status: ACTIVE | OUTPATIENT
Start: 2024-08-19 | End: 2024-08-19

## 2024-08-19 RX ORDER — FAMOTIDINE 20 MG/1
20 TABLET, FILM COATED ORAL 2 TIMES DAILY
Status: DISCONTINUED | OUTPATIENT
Start: 2024-08-19 | End: 2024-08-19

## 2024-08-19 RX ORDER — ACETAMINOPHEN 325 MG/1
650 TABLET ORAL
Status: DISCONTINUED | OUTPATIENT
Start: 2024-08-19 | End: 2024-08-21

## 2024-08-19 RX ORDER — ROCURONIUM BROMIDE 10 MG/ML
INJECTION, SOLUTION INTRAVENOUS AS NEEDED
Status: DISCONTINUED | OUTPATIENT
Start: 2024-08-19 | End: 2024-08-19 | Stop reason: SURG

## 2024-08-19 RX ORDER — MIDAZOLAM HYDROCHLORIDE 1 MG/ML
INJECTION INTRAMUSCULAR; INTRAVENOUS AS NEEDED
Status: DISCONTINUED | OUTPATIENT
Start: 2024-08-19 | End: 2024-08-19 | Stop reason: SURG

## 2024-08-19 RX ORDER — SODIUM CHLORIDE, SODIUM LACTATE, POTASSIUM CHLORIDE, CALCIUM CHLORIDE 600; 310; 30; 20 MG/100ML; MG/100ML; MG/100ML; MG/100ML
INJECTION, SOLUTION INTRAVENOUS CONTINUOUS
Status: DISCONTINUED | OUTPATIENT
Start: 2024-08-19 | End: 2024-08-19 | Stop reason: HOSPADM

## 2024-08-19 RX ORDER — METOCLOPRAMIDE HYDROCHLORIDE 5 MG/ML
10 INJECTION INTRAMUSCULAR; INTRAVENOUS EVERY 8 HOURS PRN
Status: DISCONTINUED | OUTPATIENT
Start: 2024-08-19 | End: 2024-08-19 | Stop reason: HOSPADM

## 2024-08-19 RX ORDER — KETOROLAC TROMETHAMINE 30 MG/ML
INJECTION, SOLUTION INTRAMUSCULAR; INTRAVENOUS AS NEEDED
Status: DISCONTINUED | OUTPATIENT
Start: 2024-08-19 | End: 2024-08-19 | Stop reason: SURG

## 2024-08-19 RX ORDER — KETOROLAC TROMETHAMINE 30 MG/ML
15 INJECTION, SOLUTION INTRAMUSCULAR; INTRAVENOUS EVERY 6 HOURS
Status: DISCONTINUED | OUTPATIENT
Start: 2024-08-19 | End: 2024-08-21

## 2024-08-19 RX ORDER — ACETAMINOPHEN 325 MG/1
650 TABLET ORAL ONCE
Status: COMPLETED | OUTPATIENT
Start: 2024-08-19 | End: 2024-08-19

## 2024-08-19 RX ORDER — ONDANSETRON 2 MG/ML
4 INJECTION INTRAMUSCULAR; INTRAVENOUS EVERY 6 HOURS PRN
Status: DISCONTINUED | OUTPATIENT
Start: 2024-08-19 | End: 2024-08-21

## 2024-08-19 RX ORDER — BISACODYL 10 MG
10 SUPPOSITORY, RECTAL RECTAL
Status: DISCONTINUED | OUTPATIENT
Start: 2024-08-19 | End: 2024-08-21

## 2024-08-19 RX ORDER — DIPHENHYDRAMINE HYDROCHLORIDE 50 MG/ML
12.5 INJECTION INTRAMUSCULAR; INTRAVENOUS EVERY 4 HOURS PRN
Status: DISCONTINUED | OUTPATIENT
Start: 2024-08-19 | End: 2024-08-21

## 2024-08-19 RX ORDER — HYDROMORPHONE HYDROCHLORIDE 1 MG/ML
0.2 INJECTION, SOLUTION INTRAMUSCULAR; INTRAVENOUS; SUBCUTANEOUS EVERY 5 MIN PRN
Status: DISCONTINUED | OUTPATIENT
Start: 2024-08-19 | End: 2024-08-19 | Stop reason: HOSPADM

## 2024-08-19 RX ORDER — HYDROMORPHONE HYDROCHLORIDE 1 MG/ML
0.4 INJECTION, SOLUTION INTRAMUSCULAR; INTRAVENOUS; SUBCUTANEOUS EVERY 2 HOUR PRN
Status: DISCONTINUED | OUTPATIENT
Start: 2024-08-19 | End: 2024-08-21

## 2024-08-19 RX ORDER — HYDROMORPHONE HYDROCHLORIDE 1 MG/ML
0.6 INJECTION, SOLUTION INTRAMUSCULAR; INTRAVENOUS; SUBCUTANEOUS EVERY 5 MIN PRN
Status: DISCONTINUED | OUTPATIENT
Start: 2024-08-19 | End: 2024-08-19 | Stop reason: HOSPADM

## 2024-08-19 RX ORDER — ESCITALOPRAM OXALATE 20 MG/1
20 TABLET ORAL EVERY 24 HOURS
Status: DISCONTINUED | OUTPATIENT
Start: 2024-08-20 | End: 2024-08-21

## 2024-08-19 RX ORDER — FAMOTIDINE 10 MG/ML
20 INJECTION, SOLUTION INTRAVENOUS 2 TIMES DAILY
Status: DISCONTINUED | OUTPATIENT
Start: 2024-08-19 | End: 2024-08-19

## 2024-08-19 RX ORDER — ROPINIROLE 0.25 MG/1
0.5 TABLET, FILM COATED ORAL NIGHTLY
Status: DISCONTINUED | OUTPATIENT
Start: 2024-08-19 | End: 2024-08-21

## 2024-08-19 RX ORDER — ROPINIROLE 0.5 MG/1
0.5 TABLET, FILM COATED ORAL NIGHTLY
COMMUNITY

## 2024-08-19 RX ORDER — ASPIRIN 81 MG/1
TABLET, COATED ORAL
COMMUNITY
Start: 2024-08-09

## 2024-08-19 RX ORDER — TRANEXAMIC ACID 10 MG/ML
1000 INJECTION, SOLUTION INTRAVENOUS ONCE
Status: COMPLETED | OUTPATIENT
Start: 2024-08-19 | End: 2024-08-19

## 2024-08-19 RX ORDER — FERROUS SULFATE 325(65) MG
325 TABLET, DELAYED RELEASE (ENTERIC COATED) ORAL
Status: DISCONTINUED | OUTPATIENT
Start: 2024-08-20 | End: 2024-08-21

## 2024-08-19 RX ORDER — HYDROMORPHONE HYDROCHLORIDE 1 MG/ML
0.4 INJECTION, SOLUTION INTRAMUSCULAR; INTRAVENOUS; SUBCUTANEOUS EVERY 5 MIN PRN
Status: DISCONTINUED | OUTPATIENT
Start: 2024-08-19 | End: 2024-08-19 | Stop reason: HOSPADM

## 2024-08-19 RX ORDER — NEOSTIGMINE METHYLSULFATE 1 MG/ML
INJECTION INTRAVENOUS AS NEEDED
Status: DISCONTINUED | OUTPATIENT
Start: 2024-08-19 | End: 2024-08-19 | Stop reason: SURG

## 2024-08-19 RX ORDER — NALOXONE HYDROCHLORIDE 0.4 MG/ML
0.08 INJECTION, SOLUTION INTRAMUSCULAR; INTRAVENOUS; SUBCUTANEOUS AS NEEDED
Status: DISCONTINUED | OUTPATIENT
Start: 2024-08-19 | End: 2024-08-19 | Stop reason: HOSPADM

## 2024-08-19 RX ORDER — METOCLOPRAMIDE HYDROCHLORIDE 5 MG/ML
10 INJECTION INTRAMUSCULAR; INTRAVENOUS EVERY 8 HOURS PRN
Status: DISCONTINUED | OUTPATIENT
Start: 2024-08-19 | End: 2024-08-21

## 2024-08-19 RX ORDER — SODIUM CHLORIDE, SODIUM LACTATE, POTASSIUM CHLORIDE, CALCIUM CHLORIDE 600; 310; 30; 20 MG/100ML; MG/100ML; MG/100ML; MG/100ML
INJECTION, SOLUTION INTRAVENOUS CONTINUOUS
Status: DISCONTINUED | OUTPATIENT
Start: 2024-08-19 | End: 2024-08-21

## 2024-08-19 RX ORDER — DEXAMETHASONE SODIUM PHOSPHATE 4 MG/ML
VIAL (ML) INJECTION AS NEEDED
Status: DISCONTINUED | OUTPATIENT
Start: 2024-08-19 | End: 2024-08-19 | Stop reason: SURG

## 2024-08-19 RX ORDER — DIPHENHYDRAMINE HCL 25 MG
25 CAPSULE ORAL EVERY 4 HOURS PRN
Status: DISCONTINUED | OUTPATIENT
Start: 2024-08-19 | End: 2024-08-21

## 2024-08-19 RX ORDER — GLYCOPYRROLATE 0.2 MG/ML
INJECTION, SOLUTION INTRAMUSCULAR; INTRAVENOUS AS NEEDED
Status: DISCONTINUED | OUTPATIENT
Start: 2024-08-19 | End: 2024-08-19 | Stop reason: SURG

## 2024-08-19 RX ORDER — TRAMADOL HYDROCHLORIDE 50 MG/1
50 TABLET ORAL EVERY 6 HOURS SCHEDULED
Status: DISCONTINUED | OUTPATIENT
Start: 2024-08-19 | End: 2024-08-21

## 2024-08-19 RX ORDER — OXYCODONE HYDROCHLORIDE 5 MG/1
5 TABLET ORAL EVERY 4 HOURS PRN
Status: DISCONTINUED | OUTPATIENT
Start: 2024-08-19 | End: 2024-08-21

## 2024-08-19 RX ORDER — ACETAMINOPHEN 500 MG
1000 TABLET ORAL ONCE
Status: DISCONTINUED | OUTPATIENT
Start: 2024-08-19 | End: 2024-08-19 | Stop reason: HOSPADM

## 2024-08-19 RX ORDER — PANTOPRAZOLE SODIUM 40 MG/1
40 TABLET, DELAYED RELEASE ORAL
Status: DISCONTINUED | OUTPATIENT
Start: 2024-08-20 | End: 2024-08-21

## 2024-08-19 RX ORDER — SENNOSIDES 8.6 MG
17.2 TABLET ORAL NIGHTLY
Status: DISCONTINUED | OUTPATIENT
Start: 2024-08-19 | End: 2024-08-21

## 2024-08-19 RX ORDER — ONDANSETRON 2 MG/ML
INJECTION INTRAMUSCULAR; INTRAVENOUS AS NEEDED
Status: DISCONTINUED | OUTPATIENT
Start: 2024-08-19 | End: 2024-08-19 | Stop reason: SURG

## 2024-08-19 RX ORDER — HYDROMORPHONE HYDROCHLORIDE 1 MG/ML
INJECTION, SOLUTION INTRAMUSCULAR; INTRAVENOUS; SUBCUTANEOUS
Status: DISCONTINUED
Start: 2024-08-19 | End: 2024-08-21

## 2024-08-19 RX ORDER — CELECOXIB 200 MG/1
200 CAPSULE ORAL DAILY
COMMUNITY
Start: 2024-08-09

## 2024-08-19 RX ORDER — ATORVASTATIN CALCIUM 10 MG/1
10 TABLET, FILM COATED ORAL NIGHTLY
Status: DISCONTINUED | OUTPATIENT
Start: 2024-08-19 | End: 2024-08-21

## 2024-08-19 RX ORDER — SODIUM PHOSPHATE, DIBASIC AND SODIUM PHOSPHATE, MONOBASIC 7; 19 G/230ML; G/230ML
1 ENEMA RECTAL ONCE AS NEEDED
Status: DISCONTINUED | OUTPATIENT
Start: 2024-08-19 | End: 2024-08-21

## 2024-08-19 RX ADMIN — TRANEXAMIC ACID 1000 MG: 10 INJECTION, SOLUTION INTRAVENOUS at 11:58:00

## 2024-08-19 RX ADMIN — ROCURONIUM BROMIDE 10 MG: 10 INJECTION, SOLUTION INTRAVENOUS at 12:10:00

## 2024-08-19 RX ADMIN — ROCURONIUM BROMIDE 5 MG: 10 INJECTION, SOLUTION INTRAVENOUS at 13:03:00

## 2024-08-19 RX ADMIN — MIDAZOLAM HYDROCHLORIDE 2 MG: 1 INJECTION INTRAMUSCULAR; INTRAVENOUS at 11:28:00

## 2024-08-19 RX ADMIN — KETOROLAC TROMETHAMINE 30 MG: 30 INJECTION, SOLUTION INTRAMUSCULAR; INTRAVENOUS at 13:13:00

## 2024-08-19 RX ADMIN — DEXAMETHASONE SODIUM PHOSPHATE 4 MG: 4 MG/ML VIAL (ML) INJECTION at 12:00:00

## 2024-08-19 RX ADMIN — ONDANSETRON 4 MG: 2 INJECTION INTRAMUSCULAR; INTRAVENOUS at 13:08:00

## 2024-08-19 RX ADMIN — SODIUM CHLORIDE, SODIUM LACTATE, POTASSIUM CHLORIDE, CALCIUM CHLORIDE: 600; 310; 30; 20 INJECTION, SOLUTION INTRAVENOUS at 11:25:00

## 2024-08-19 RX ADMIN — GLYCOPYRROLATE 0.4 MG: 0.2 INJECTION, SOLUTION INTRAMUSCULAR; INTRAVENOUS at 13:13:00

## 2024-08-19 RX ADMIN — ROCURONIUM BROMIDE 40 MG: 10 INJECTION, SOLUTION INTRAVENOUS at 11:47:00

## 2024-08-19 RX ADMIN — ROCURONIUM BROMIDE 10 MG: 10 INJECTION, SOLUTION INTRAVENOUS at 12:34:00

## 2024-08-19 RX ADMIN — NEOSTIGMINE METHYLSULFATE 2.5 MG: 1 INJECTION INTRAVENOUS at 13:13:00

## 2024-08-19 NOTE — OPERATIVE REPORT
OPERATIVE REPORT    Facility:  [facility]    Patient Name:  Leann Chatman    Age/Gender:  74 year old female  :  1950    MRN:  [id number]    Date of Operation:  2024    Preoperative Diagnosis:   LEFT HIP OSTEOARTHRITIS    Postoperative Diagnosis:   LEFT HIP OSTEOARTHRITIS    Procedure Performed:  LEFT TOTAL HIP ARTHROPLASTY    Surgeon:  MARA BOSS M.D.    Assistant:   Stephan Pitts SA       Anesthesia:  general    Estimated Blood Loss:  150cc    Drain:  NONE    Complications:  NONE    Implants:   1.  Biomet G7 acetabular shell, size 48 mm   2.  Neutral E1 highly cross-linked polyethylene liner   3.  Yessica Avenir cemented femoral stem, size 3, std offset   4.  32 mm, -3.5 Delta ceramic femoral head      Indications for Procedure:  Leann Chatman is a 74 year old female with osteoarthritis of the left hip who failed conservative management.  After consultation in the office and discussion regarding risks and benefits of surgical treatment, surgery was scheduled and informed consent obtained.      Description of Procedure:  The patient was taken to the operating room after the correct surgical site was marked in the preop holding area.  The patient was placed under anesthesia and placed in a supine position on the [place] orthopaedic table.  Preoperative antibiotics were given.  All bony prominences were padded.  The left hip was prepped and draped in usual, sterile surgical fashion.  Bony landmarks were palpated for incision and a direct anterior approach was performed to the hip between the interval of tensor fascia candy and sartorius/rectus femoris.  Lateral femoral circumflex vessels were identified, isolated and cauterized.  An L-shaped capsulotomy was performed and the capsule was tagged for retraction.  Retractors were placed inside the hip capsule and further capsular release was performed inside the saddle of the femur as well as down the medial aspect of the femoral neck.   Osteotomy of the femoral neck was performed using a sagittal saw.  A corkscrew was used to remove the femoral head.  Retractors were placed anterior and posterior to the acetabulum.  The hip labrum as well as the soft tissue in the cotyloid fossa were removed in their entirety.  Reaming was then performed using fluoroscopy to assess depth, anteversion and abduction angle.  The acetabular shell was then inserted again using fluoroscopy to assess abduction angle, anteversion and complete seating of the acetabular component.  A neutral polyethylene was then inserted and impacted without difficulty.    Attention was then turned to the femur where the femoral hook was placed around the femur just distal to the vastus tubercle and proximal to the tendon of the gluteus silvestre.  The leg was then externally rotated, extended and adducted with traction completely released.  The mechanical lift arm on the table was used to hold the proximal femur carefully.  Capsule was then further released inside the trochanter until the entire inside of the greater trochanter was easily visualized.  The lateral femoral neck remnant was removed and the femur was broached.  After broaching up to a proper size, trial head and neck components were used to determine stem offset and head length.  Fluoroscopy was used to verify the position of the broach as well as the length and offset to determine final implant positions. The trials were removed and the canal was prepared for cement.  A cement restrictor was placed in the canal at an appropriate depth and cement was mixed and loaded into the cement gun.  The canal was irrigated and suctioned and filled with cement.  The stem was inserted and cement allowed to dry.  The head was impacted onto the stem.  There were no complications with insertion of the femoral components.  The hip was then reduced under direct visualization.  Fluoroscopy again verified length, offset, implant position and stem  fill of the femoral canal.  Fluoro also verified there were no iatrogenic fractures.  The wound was copiously irrigated and the tag sutures in the capsule were tied together closing the anterior hip capsule.  Local anesthetic and pain medicine was injected into the hip capsule and surrounding soft tissues.  The fascia candy was closed with absorbable suture.  Skin was then closed in 2 layers with absorbable suture.  Dermabond was applied to the wound and allowed to dry before sterile dressings were applied.  There were no complications and the procedure went as planned.  The surgical assistant was present for the entire procedure and assisted with the approach, exposure, definitive surgery and closure.    The patient left the operating room in stable condition.      MARA BOSS M.D.

## 2024-08-19 NOTE — INTERVAL H&P NOTE
Pre-op Diagnosis: PRIMARY OSTEOARTHRITIS LEFT HIP M16.12    The above referenced H&P was reviewed by Junior Anaya MD on 8/19/2024, the patient was examined and no significant changes have occurred in the patient's condition since the H&P was performed.  I discussed with the patient and/or legal representative the potential benefits, risks and side effects of this procedure; the likelihood of the patient achieving goals; and potential problems that might occur during recuperation.  I discussed reasonable alternatives to the procedure, including risks, benefits and side effects related to the alternatives and risks related to not receiving this procedure.  We will proceed with procedure as planned.

## 2024-08-19 NOTE — PHYSICAL THERAPY NOTE
PHYSICAL THERAPY EVALUATION - INPATIENT     Room Number: 352/352-A  Evaluation Date: 8/19/2024  Type of Evaluation: Initial  Physician Order: PT Eval and Treat    Presenting Problem: s/p L BOLIVAR  Co-Morbidities : h/o CVA  Reason for Therapy: Mobility Dysfunction and Discharge Planning    PHYSICAL THERAPY ASSESSMENT   Patient is currently functioning below baseline with bed mobility, transfers, gait, and stair negotiation.  Prior to admission, patient's baseline is mod ind with rollator.  Patient is requiring supervision as a result of the following impairments: decreased functional strength and decreased endurance/aerobic capacity.  Physical Therapy will continue to follow for duration of hospitalization.    Patient will benefit from continued skilled PT Services while in house.  Will continue to update anticipated therapy needs as able.    PLAN  PT Treatment Plan: Bed mobility;Body mechanics;Endurance;Energy conservation;Patient education;Family education;Gait training;Range of motion;Strengthening;Stair training;Transfer training;Balance training  Rehab Potential : Fair  Frequency (Obs):  (2-3x/week)  Number of Visits to Meet Established Goals: 3      CURRENT GOALS    Goal #1 Patient is able to demonstrate supine - sit EOB @ level: modified independent     Goal #2 Patient is able to demonstrate transfers Sit to/from Stand at assistance level: supervision     Goal #3 Patient is able to ambulate 50 feet with assist device: walker - rolling at assistance level: supervision     Goal #4    Goal #5    Goal #6    Goal Comments: Goals established on 8/19/2024      PHYSICAL THERAPY MEDICAL/SOCIAL HISTORY  History related to current admission: Patient is a 74 year old female admitted on 8/19/2024 from home for L BOLIVAR.      HOME SITUATION  Type of Home: House   Home Layout: Multi-level                Lives With: Spouse     Patient Owned Equipment: Rolling walker       Prior Level of [place]: Pt and spouse report pt  ambulates with rollator.  Reports last fall ~2 months prior.  Pt's spouse reports they have a \"non-medical\" caregiver who comes 4x/week.  Pt's spouse reports he falls as well and Is unable to assist the patient.    SUBJECTIVE  \"Paper towel.\"        OBJECTIVE  Precautions: Bed/chair alarm  Fall Risk: High fall risk    WEIGHT BEARING RESTRICTION                   PAIN ASSESSMENT  Ratin  Location: L hip  Management Techniques: Activity promotion;Body mechanics;Relaxation;Repositioning    COGNITION  Following Commands:  follows one step commands with increased time and delayed responses at times    RANGE OF MOTION AND STRENGTH ASSESSMENT      Lower extremity ROM is within functional limits except L hip    Lower extremity strength is within functional limits except L hip consistent with BOLIVAR this date.       BALANCE  Static Sitting: Good  Dynamic Sitting: Good  Static Standing: Fair -  Dynamic Standing: Fair -    ADDITIONAL TESTS                                    ACTIVITY TOLERANCE                         O2 WALK       NEUROLOGICAL FINDINGS                        AM-PAC '6-Clicks' INPATIENT SHORT FORM - BASIC MOBILITY  How much difficulty does the patient currently have...  Patient Difficulty: Turning over in bed (including adjusting bedclothes, sheets and blankets)?: A Little   Patient Difficulty: Sitting down on and standing up from a chair with arms (e.g., wheelchair, bedside commode, etc.): A Little   Patient Difficulty: Moving from lying on back to sitting on the side of the bed?: A Little   How much help from another person does the patient currently need...   Help from Another: Moving to and from a bed to a chair (including a wheelchair)?: A Little   Help from Another: Need to walk in hospital room?: A Lot   Help from Another: Climbing 3-5 steps with a railing?: A Lot       AM-PAC Score:  Raw Score: 16   Approx Degree of Impairment: 54.16%   Standardized Score (AM-PAC Scale): 40.78   CMS Modifier (G-Code):  CK    FUNCTIONAL ABILITY STATUS  Gait Assessment   Functional Mobility/Gait Assessment  Gait Assistance: Minimum assistance (x2)  Distance (ft): 2  Assistive Device: Rolling walker  Pattern: Shuffle    Skilled Therapy Provided     Bed Mobility:  Rolling: mod A  Supine to sit: mod A   Sit to supine: NT     Transfer Mobility:  Sit to stand: min A x2 to RW   Stand to sit: min A from RW with tactile cues to it.  Gait = Bed to chair only.1 step verbal cues to initiate BLE stepping pattern.    Therapist's Comments: Pt with good BP and O2 sats while on O2 during session.  RN aware of delayed responses.  Spouse present for beginning of session to give some history.  Pt requesting paper towels from home as uses for chronic increased phlegm.    Exercise/Education Provided:  Bed mobility  Body mechanics  Energy conservation  Functional activity tolerated  Gait training  Transfer training    Patient End of Session: Up in chair;With  staff;Needs met;Call light within reach;RN aware of session/findings;All patient questions and concerns addressed;Alarm set;Discussed recommendations with /      Patient Evaluation Complexity Level:  History High - 3 or more personal factors and/or co-morbidities   Examination of body systems Moderate - addressing a total of 3 or more elements   Clinical Presentation Moderate - Evolving   Clinical Decision Making Moderate - Evolving       PT Session Time: 25 minutes  Therapeutic Activity: 15 minutes

## 2024-08-19 NOTE — ANESTHESIA PROCEDURE NOTES
Airway  Date/Time: 8/19/2024 11:50 AM  Urgency: elective    Airway not difficult    General Information and Staff    Patient location during procedure: OR  Anesthesiologist: Eriberto Conde MD  Performed: anesthesiologist   Performed by: Eriberto Conde MD  Authorized by: Eriberto Conde MD      Indications and Patient Condition  Indications for airway management: anesthesia  Spontaneous Ventilation: absent  Sedation level: deep  Preoxygenated: yes  Patient position: sniffing  Mask difficulty assessment: 1 - vent by mask    Final Airway Details  Final airway type: endotracheal airway      Successful airway: ETT  Cuffed: yes   Successful intubation technique: direct laryngoscopy  Endotracheal tube insertion site: oral  Blade: [place]  Blade size: #3  ETT size (mm): 7.5    Cormack-Lehane Classification: grade IIA - partial view of glottis  Measured from: lips  ETT to lips (cm): 22  Number of attempts at approach: 1

## 2024-08-19 NOTE — ANESTHESIA PREPROCEDURE EVALUATION
PRE-OP EVALUATION    Patient Name: Leann Chatman    Admit Diagnosis: PRIMARY OSTEOARTHRITIS LEFT HIP M16.12    Pre-op Diagnosis: PRIMARY OSTEOARTHRITIS LEFT HIP M16.12    LEFT ANTERIOR TOTAL HIP ARTHROPLASTY    Anesthesia Procedure: LEFT ANTERIOR TOTAL HIP ARTHROPLASTY (Left: Hip)    Surgeons and Role:     * Junior Anaya MD - Primary    Pre-op vitals reviewed.  Temp: 98.2 °F (36.8 °C)  Pulse: 57  Resp: 16  BP: 116/67  SpO2: 95 %  Body mass index is 23.23 kg/m².    Current medications reviewed.  Hospital Medications:   acetaminophen (Tylenol Extra Strength) tab 1,000 mg  1,000 mg Oral Once    lactated ringers infusion   Intravenous Continuous    tranexamic acid in sodium chloride 0.7% (Cyklokapron) 1000 mg/100mL infusion premix 1,000 mg  1,000 mg Intravenous Once    vancomycin (Vancocin) 750 mg in sodium chloride 0.9% 250 mL IVPB  15 mg/kg Intravenous Once    And    gentamicin (Garamycin) 260 mg in sodium chloride 0.9% 100 mL IVPB  5 mg/kg (Ideal) Intravenous Once    povidone-iodine (Betadine) 10 % 17.5 mL in sodium chloride 0.9 % 500 mL irrigation   Irrigation Once (Intra-Op)    clonidine/epinephrine/ropivacaine/ketorolac in 0.9% NaCl 60 mL pain cocktail syringe for hip arthroplasty   Infiltration Once (Intra-Op)       Outpatient Medications:     Medications Prior to Admission   Medication Sig Dispense Refill Last Dose    escitalopram 10 MG Oral Tab Take 1 tablet (10 mg total) by mouth daily.       Lactobacillus (PROBIOTIC ACIDOPHILUS OR) Take by mouth.       Omega-3 Fatty Acids (FISH OIL CONCENTRATE OR) Take by mouth.       Calcium Carb-Cholecalciferol (CALTRATE BONE HEALTH OR) Take by mouth.       Multiple Vitamins-Minerals (CENTRUM SILVER OR) Take by mouth.       oxyCODONE-Acetaminophen (PERCOCET OR) Take by mouth.       metoprolol tartrate 25 MG Oral Tab Take 1 tablet (25 mg total) by mouth daily. 90 tablet 0     amLODIPine 10 MG Oral Tab Take 1 tablet (10 mg total) by mouth daily. 90 tablet 3      simvastatin 20 MG Oral Tab Take 1 tablet (20 mg total) by mouth nightly. 90 tablet 3     clopidogrel 75 MG Oral Tab Take 1 tablet (75 mg total) by mouth daily. 90 tablet 3     clobetasol 0.05 % External Cream Apply 1 Application topically daily.       fluticasone propionate 50 MCG/ACT Nasal Suspension 2 sprays by Each Nare route daily.       acetaminophen 325 MG Oral Tab Take 2 tablets (650 mg total) by mouth every 4 (four) hours as needed for Pain.       LORazepam 0.5 MG Oral Tab Take 1 tablet (0.5 mg total) by mouth in the morning, at noon, and at bedtime.       Valbenazine Tosylate (INGREZZA) 40 MG Oral Cap Take by mouth daily.       aspirin 81 MG Oral Chew Tab Chew 1 tablet (81 mg total) by mouth daily.       Cyanocobalamin (B-12) 500 MCG Oral Tab Take 1,000 Units by mouth daily.         traMADol 50 MG Oral Tab Take 1 tablet (50 mg total) by mouth every 6 (six) hours as needed for Pain. (Patient not taking: Reported on 7/24/2024) 30 tablet 1 Not Taking    Omeprazole 40 MG Oral Capsule Delayed Release Take 1 capsule (40 mg total) by mouth daily. (Patient not taking: Reported on 7/24/2024) 90 capsule 0 Not Taking       Allergies: Ace inhibitors, Diazepam, Keflex, Lexapro, Cozaar [losartan], Levaquin, Zyrtec [cetirizine hcl], and Mastisol adhesive      Anesthesia Evaluation        Anesthetic Complications           GI/Hepatic/Renal      (+) GERD                           Cardiovascular  Comment: Carotid stenosis s/p CEA    ECG reviewed.            (+) hypertension                                     Endo/Other                                  Pulmonary                           Neuro/Psych  Comment: Tardive dyskinesia    (+) depression  (+) anxiety                              Past Surgical History:   Procedure Laterality Date    Back surgery  2007    Back surgery  09/18/2017    kyphoplasty T-12 @ [facility]- Dr Doshi     Colonoscopy      2013    Hysterectomy  1989    also tummy [facility]    Inner ear surgery proc  unlisted Right     MRI compatable wire placed    Mastectomy left      has implants- not due to CA    Mastectomy right      has implants-not due to CA    Other surgical history  2021    Stent subclavian artery Dr. 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   Tobacco Use    Smoking status: Former     Current packs/day: 0.00     Types: Cigarettes     Quit date: 1986     Years since quittin.4    Smokeless tobacco: Never   Vaping Use    Vaping status: Never Used   Substance and Sexual Activity    Alcohol use: Yes     Alcohol/week: 1.0 standard drink of alcohol     Types: 1 Cans of beer per week     Comment: beer sometimes    Drug use: No    Sexual activity: Yes     Partners: Male   Other Topics Concern    Caffeine Concern No     Comment: 2 cups coffee in AM    Exercise No     Comment: 2x per week     History   Drug Use No     Available pre-op labs reviewed.  Lab Results   Component Value Date    WBC 12.2 (H) 2024    RBC 4.67 2024    HGB 13.8 2024    HCT 40.9 2024    MCV 87.6 2024    MCH 29.6 2024    MCHC 33.7 2024    RDW 14.5 2024    .0 2024     Lab Results   Component Value Date     (L) 2024    K 4.5 2024    CL 94 (L) 2024    CO2 30.0 2024    BUN 26 (H) 2024    CREATSERUM 0.69 2024     (H) 2024    CA 9.4 2024            Airway      Mallampati: II  Mouth opening: 3 FB  TM distance: 4 - 6 cm  Neck ROM: full Cardiovascular      Rhythm: regular  Rate: normal     Dental             Pulmonary      Breath sounds clear to auscultation bilaterally.               Other findings              ASA: 3   Plan: spinal  NPO status verified and patient meets guidelines.  Patient has taken beta blockers in last 24  hours.  Post-procedure pain management plan discussed with surgeon and patient.      Plan/risks discussed with: patient  Use of blood product(s) discussed with: patient              Present on Admission:  **None**

## 2024-08-19 NOTE — PLAN OF CARE
Patient admitted under Dr Chavez. Vitals stable, pain controlled. 2 max assist to BSC. Dressing dry and intact. Oriented to room and procedures.

## 2024-08-19 NOTE — ANESTHESIA POSTPROCEDURE EVALUATION
[facility]    Leann Chatman Patient Status:  Outpatient in a Bed   Age/Gender 74 year old female MRN [id number]   Location [facility] SURGERY Attending Junior Anaya MD   Hosp Day # 0 PCP Anne Burnett MD       Anesthesia Post-op Note    LEFT ANTERIOR TOTAL HIP ARTHROPLASTY    Procedure Summary       Date: 08/19/24 Room / Location:  MAIN OR 14 / [facility] MAIN OR    Anesthesia Start: 1125 Anesthesia Stop: 1337    Procedure: LEFT ANTERIOR TOTAL HIP ARTHROPLASTY (Left: Hip) Diagnosis: (PRIMARY OSTEOARTHRITIS LEFT HIP M16.12)    Surgeons: Junior Anaya MD Anesthesiologist: Eriberto Conde MD    Anesthesia Type: general ASA Status: 3            Anesthesia Type: general    Vitals Value Taken Time   /58 08/19/24 1337   Temp 98.9 °F (37.2 °C) 08/19/24 1337   Pulse 72 08/19/24 1337   Resp 20 08/19/24 1337   SpO2 92 % 08/19/24 1337       Patient Location: PACU    Anesthesia Type: general    Airway Patency: patent    Postop Pain Control: adequate    Mental Status: preanesthetic baseline    Nausea/Vomiting: none    Cardiopulmonary/Hydration status: stable euvolemic    Complications: no apparent anesthesia related complications    Postop vital signs: stable    Dental Exam: Unchanged from Preop    Patient to be discharged from PACU when criteria met.

## 2024-08-19 NOTE — DISCHARGE INSTRUCTIONS
Sometimes managing your health at home requires assistance.   The [facility] team has recognized your preference to use [facility].  They can be reached by phone at [phone].  The fax number for your reference is [phone].  A representative from the home health agency will contact you or your family to schedule your first visit

## 2024-08-19 NOTE — CONSULTS
[place] [facility]OSPITAL[facility]  CONSULT     Leann Chatman Patient Status:  Outpatient in a Bed    1950 MRN [id number]   Location [facility] 3SW-A Attending Junior Anaya MD   Hosp Day # 0 PCP Anne Burnett MD     Reason for consult: med management    Requested by: Dr. Anaya    Subjective:   History of Present Illness:     Leann Chatman is a 74 year old female with L hip OA, anxiety, CAD, depression, HTN, DL, tardive dyskinesia presents for elective L total hip arthoplasty. Pt underwent procedure this afternoon and tolerated well. Pain controlled. No CP or SOB. No N/V/D/C or abd pain. Pt worked with PT already this evening.     History/Other:    Past Medical History:  Past Medical History:    Anxiety state    Back problem    Spinal Stenosis    Cataract    starting; No Lenses    Closed fracture of transverse process of lumbar vertebra with routine healing    Contact dermatitis and other eczema, due to unspecified cause    Coronary atherosclerosis    Subclavian Stent    Depression    Difficult intubation    pt says she has been told to say she has a small mouth/throat    Diverticulosis    GERD (gastroesophageal reflux disease)    EGD 2011    Hearing impairment    otolodosclerosis both ears; implant right ear    Hemorrhoids    Herpes zoster    High blood pressure    High cholesterol    History of lumbar discectomy    History of right-sided carotid endarterectomy    HTN (hypertension)    Hyperlipidemia    Impaired fasting glucose    Irritable bowel    OAB (overactive bladder)    Osteoarthritis    Osteoarthritis of left hip    Osteoarthritis of lumbar spine    Psoriasis    Subclavian arterial stenosis (HCC)    Tardive dyskinesia    Visual impairment    Glasses    Vitamin D deficiency     Past Surgical History:   Past Surgical History:   Procedure Laterality Date    Back surgery      Back surgery  2017    kyphoplasty T-12 @ [facility]- Dr Doshi     Colonoscopy      2013    Hysterectomy      also  tummy [facility]    Inner ear surgery proc unlisted Right 1992    MRI compatable wire placed    Mastectomy left      has implants- not due to CA    Mastectomy right      has implants-not due to CA    Other surgical history  01/12/2021    Stent subclavian artery Dr. Paulino    Other surgical history Bilateral     eye lid surgery      Family History:   Family History   Problem Relation Age of Onset    Hypertension Father     Other (Other) Father         LEUKEMIA    Hypertension Mother     Breast Cancer Mother     Other (Other) Mother         COPD    Hypertension Brother     Other (Other) Brother     Diabetes Maternal Aunt     Ovarian Cancer Paternal Aunt     Diabetes Maternal Uncle      Social History:    reports that she quit smoking about 38 years ago. Her smoking use included cigarettes. She has never used smokeless tobacco. She reports current alcohol use of about 1.0 standard drink of alcohol per week. She reports that she does not use drugs.     Allergies:   Allergies   Allergen Reactions    Ace Inhibitors OTHER (SEE COMMENTS)     Can't recall    Diazepam OTHER (SEE COMMENTS)     Can't recall    Keflex HIVES    Lexapro OTHER (SEE COMMENTS)     \"didn't agree with me\"    Cozaar [Losartan] OTHER (SEE COMMENTS)     Watery eyes, blister under eye    Levaquin OTHER (SEE COMMENTS)     Can't recall    Zyrtec [Cetirizine Hcl] OTHER (SEE COMMENTS)     Can't recall    Mastisol Adhesive RASH     If patient has bandaid on too long will break out in rash,  Patient states she is able to wear latex gloves.         Medications:    No current facility-administered medications on file prior to encounter.     Current Outpatient Medications on File Prior to Encounter   Medication Sig Dispense Refill    oxyCODONE 5 MG Oral Tab Take 0.5-1 tablets (2.5-5 mg total) by mouth every 4 (four) hours as needed.      celecoxib 200 MG Oral Cap Take 1 capsule (200 mg total) by mouth daily.      sennosides-docusate 8.6-50 MG Oral Tab Take 1 tablet by  mouth daily.      ASPIRIN LOW DOSE 81 MG Oral Tab EC TAKE 1 TABLET BY MOUTH TWICE DAILY FOR SIX WEEKS TOTAL FOR BLOOD CLOT PREVENTION      rOPINIRole 0.5 MG Oral Tab Take 1 tablet (0.5 mg total) by mouth at bedtime.      escitalopram 20 MG Oral Tab Take 1 tablet (20 mg total) by mouth daily.      Lactobacillus (PROBIOTIC ACIDOPHILUS OR) Take 1 capsule by mouth daily.      Omega-3 Fatty Acids (FISH OIL CONCENTRATE OR) Take 1 capsule by mouth daily.      Calcium Carb-Cholecalciferol (CALTRATE BONE HEALTH OR) Take 1 tablet by mouth daily.      Multiple Vitamins-Minerals (CENTRUM SILVER OR) Take 1 tablet by mouth daily.      amLODIPine 10 MG Oral Tab Take 1 tablet (10 mg total) by mouth daily. 90 tablet 3    simvastatin 20 MG Oral Tab Take 1 tablet (20 mg total) by mouth nightly. 90 tablet 3    clopidogrel 75 MG Oral Tab Take 1 tablet (75 mg total) by mouth daily. 90 tablet 3    clobetasol 0.05 % External Cream Apply 1 Application topically daily as needed.      fluticasone propionate 50 MCG/ACT Nasal Suspension 2 sprays by Each Nare route daily as needed.      acetaminophen 325 MG Oral Tab Take 2 tablets (650 mg total) by mouth every 4 (four) hours as needed for Pain.      LORazepam 0.5 MG Oral Tab Take 1 tablet (0.5 mg total) by mouth in the morning, at noon, and at bedtime.      Valbenazine Tosylate (INGREZZA) 40 MG Oral Cap Take 1 tablet by mouth daily.      aspirin 81 MG Oral Chew Tab Chew 1 tablet (81 mg total) by mouth daily.      Cyanocobalamin (B-12) 500 MCG Oral Tab Take 1,000 Units by mouth daily.        Omeprazole 40 MG Oral Capsule Delayed Release Take 1 capsule (40 mg total) by mouth daily. 90 capsule 0       Review of Systems:   A comprehensive review of systems was completed.    Pertinent positives and negatives noted in the HPI.    Objective:   Physical Exam:    BP 99/54 (BP Location: Left arm)   Pulse 64   Temp 98.1 °F (36.7 °C) (Oral)   Resp 18   Ht 157.5 cm (5' 2\")   Wt 127 lb (57.6 kg)   SpO2  96%   BMI 23.23 kg/m²   General: No acute distress, Alert  Respiratory: No rhonchi, no wheezes  Cardiovascular: S1, S2. Regular rate and rhythm  Abdomen: Soft, NT/ND, +BS  Neuro: No new focal deficits  Extremities: No edema, L hip incision CDI    Results:    Labs:      Labs Last 24 Hours:  No results for input(s): \"WBC\", \"HGB\", \"MCV\", \"PLT\", \"BAND\", \"INR\" in the last 168 hours.    Invalid input(s): \"LYM#\", \"MONO#\", \"BASOS#\", \"EOSIN#\"    No results for input(s): \"GLU\", \"BUN\", \"CREATSERUM\", \"GFRAA\", \"GFRNAA\", \"CA\", \"ALB\", \"NA\", \"K\", \"CL\", \"CO2\", \"ALKPHO\", \"AST\", \"ALT\", \"BILT\", \"TP\" in the last 168 hours.    No results for input(s): \"PTP\", \"INR\" in the last 168 hours.    No results for input(s): \"TROP\", \"CK\" in the last 168 hours.      Imaging: Imaging data reviewed in Epic.    Assessment & Plan:      #L Hip OA  S/p L total hip arthoplasty 8/19/2024  Per Ortho  Pain control  PT/OT    #Ess HTN  Hold BP meds for now (norvasc and BB)  Resume in AM if BP improves    #Dyslipdemia  Statin    #CAD  Hold plavix    #Anxiety  Resume home meds    #Tardive Dyskinesia  Speech eval in AM      Plan of care discussed with patient, Dania Jones MD  8/19/2024    The 21st Century Cures Act makes medical notes like these available to patients in the interest of transparency. Please be advised this is a medical document. Medical documents are intended to carry relevant information, facts as evident, and the clinical opinion of the practitioner. The medical note is intended as peer to peer communication and may appear blunt or direct. It is written in medical language and may contain abbreviations or verbiage that are unfamiliar.

## 2024-08-20 PROBLEM — Z01.818 PRE-OP TESTING: Status: ACTIVE | Noted: 2024-08-20

## 2024-08-20 LAB
CREAT BLD-MCNC: 0.49 MG/DL
EGFRCR SERPLBLD CKD-EPI 2021: 98 ML/MIN/1.73M2 (ref 60–?)
HCT VFR BLD AUTO: 28.6 %
HGB BLD-MCNC: 10 G/DL

## 2024-08-20 PROCEDURE — 99232 SBSQ HOSP IP/OBS MODERATE 35: CPT | Performed by: HOSPITALIST

## 2024-08-20 RX ORDER — CLOPIDOGREL BISULFATE 75 MG/1
75 TABLET ORAL DAILY
Status: DISCONTINUED | OUTPATIENT
Start: 2024-08-20 | End: 2024-08-21

## 2024-08-20 RX ORDER — TRAMADOL HYDROCHLORIDE 50 MG/1
50 TABLET ORAL EVERY 6 HOURS SCHEDULED
Status: SHIPPED | COMMUNITY
Start: 2024-08-20 | End: 2024-08-21

## 2024-08-20 NOTE — CM/SW NOTE
08/20/24 1424   Choice of Post-Acute Provider   Informed patient of right to choose their preferred provider Yes   List of appropriate post-acute services provided to patient/family with quality data Yes   Information given to Patient;Spouse/Significant other       Met with pt/spouse at bedside and provided list of accepting NH facilities with private pay rates indicated for each option.  Pt's  to review options to confirm preferred facility.  Await choice and medical clearance for DC.  / to remain available for support and/or discharge planning.     Shilpa Nguyen [facility]  Discharge Planner  [phone]    Addendum:  Met with pt/spouse and spoke with pt's Chrystal douglas by phone.  All questions/concerns addressed.  Pt/spouse have chosen [facility] for private pay NH admission.  Anticipate possible DC tomorrow.    Spoke with Lydia Van at the [facility] - she will contact pt's  directly to discuss private pay arrangements.  Await medical clearance for DC.    Addendum:  Received call from Lydia who stated that the [facility] has 3 new COVID positive patients and they no longer have bed availability.    Met with pt/spouse and provided list of accepting facilities.  Pt's  to review again to determine which facility they now prefer.  Referral also re-opened to determine if there are any other options for accepting facilities.  SW to follow up in AM.

## 2024-08-20 NOTE — SLP NOTE
ADULT SWALLOWING EVALUATION    ASSESSMENT    ASSESSMENT/OVERALL IMPRESSION:  Patient is a 74 year old female with left hip OA, anxiety, CAD, depression, HTN, tardive dyskinesia presented to hospital for L BOLIVAR. Patient had multiple dental procedures performed which she reports having affected her speech production and makes it difficult to chew. Patient also reports that she was seen by HH SLP in the past to focus on speech.  reports following SLP visits the patient would not always carryover with strategies.   Oral motor mechanism exam revealed functional oral range, rate, and strength of oral musculature, functional dentition and implants, and clear vocal quality. Strong cough on command.    Assessed patient with thin liquids via spoon, cup, and straw, puree, and solids.   Oral phase revealed functional oral acceptance, retrieval and mastication of solids with timely and complete clearing of the oral cavity.   Pharyngeal phase revealed an apparent timely initiation of the pharyngeal phase with functional hyolaryngeal elevation on palpation. No coughing or throat clearing. Patient presents with functional oral phase and apparent functional pharyngeal phase of swallow free of overt clinical s/s of aspiration.     Recommend regular (Choose softer solids as patient preference) and thin liquids. Patient with safe and efficient po intake of regular solids and thin liquids.   No further follow up for skilled dysphagia therapy.         RECOMMENDATIONS   Diet Recommendations - Solids: Regular  Diet Recommendations - Liquids: Thin Liquids         Compensatory Strategies Recommended: Small bites and sips;Slow rate  Aspiration Precautions: Upright position;Slow rate  Medication Administration Recommendations: Whole in puree  Treatment Plan/Recommendations: No further inpatient SLP service warranted    HISTORY   MEDICAL HISTORY  Reason for Referral: RN dysphagia screen    Problem List  Active Problems:    Benign  essential HTN    Dyslipidemia    Anxiety      Past Medical History  Past Medical History:    Anxiety state    Back problem    Spinal Stenosis    Cataract    starting; No Lenses    Closed fracture of transverse process of lumbar vertebra with routine healing    Contact dermatitis and other eczema, due to unspecified cause    Coronary atherosclerosis    Subclavian Stent    Depression    Difficult intubation    pt says she has been told to say she has a small mouth/throat    Diverticulosis    GERD (gastroesophageal reflux disease)    EGD 4/2011    Hearing impairment    otolodosclerosis both ears; implant right ear    Hemorrhoids    Herpes zoster    High blood pressure    High cholesterol    History of lumbar discectomy    History of right-sided carotid endarterectomy    HTN (hypertension)    Hyperlipidemia    Impaired fasting glucose    Irritable bowel    OAB (overactive bladder)    Osteoarthritis    Osteoarthritis of left hip    Osteoarthritis of lumbar spine    Psoriasis    Subclavian arterial stenosis (HCC)    Tardive dyskinesia    Visual impairment    Glasses    Vitamin D deficiency       Prior Living Situation: Home with spouse  Diet Prior to Admission: Regular;Thin liquids (chooses softer foods)  Precautions: None    Patient/Family Goals: To eat and drink    SWALLOWING HISTORY  Current Diet Consistency: Regular;Thin liquids  Dysphagia History: No past history reported  Imaging Results:     SUBJECTIVE       OBJECTIVE   ORAL MOTOR EXAMINATION  Dentition: Natural;Functional (implants with dental procedures done recently which patient attributes to changes in speech production)  Symmetry: Within Functional Limits  Strength: Within Functional Limits  Tone: Within Functional Limits  Range of Motion: Within Functional Limits  Rate of Motion: Within Functional Limits    Voice Quality: Weak  Respiratory Status: Unlabored  Consistencies Trialed: Thin liquids;Puree;Hard solid  Method of Presentation: Self  presentation;Spoon;Cup;Straw;Consecutive swallows  Patient Positioning: Upright;Midline (in chair)    Oral Phase of Swallow: Within Functional Limits                      Pharyngeal Phase of Swallow: Within Functional Limits           (Please note: Silent aspiration cannot be evaluated clinically. Videofluoroscopic Swallow Study is required to rule-out silent aspiration.)    Esophageal Phase of Swallow: No complaints consistent with possible esophageal involvement  Comments:       FOLLOW UP  Treatment Plan/Recommendations: No further inpatient SLP service warranted     Follow Up Needed (Documentation Required): No       Thank you for your referral.   If you have any questions, please contact EMILY Vann

## 2024-08-20 NOTE — PLAN OF CARE
Patient A&OX4, VSS on RA, . POD 1 dressing C/D/I. Gel ice as tolerated. CMS intact. SLP recommends regular diet as tolerated. Patient tolerating well. Orthostatic when ambulating with therapy per OT. Asymptomatic upon RN assessment, SBP in 100's. Plan to see PT/OT after re eval and DC when medically cleared. Safety measures in place.

## 2024-08-20 NOTE — PLAN OF CARE
Assumed care of pt at approximately 1930.  Alert, oriented x4, drowsy, arouses to speech,  Weaned to 2L O2.  Dressing to L hip dry and intact.   Pain managed with scheduled Tylenol, Tramadol and Toradol.   Up x2, and a walker to the [facility].   Plan for SLP/PT/OT today.  Spouse at bedside over night.   Plan of care reviewed with pt.    Problem: PAIN - ADULT  Goal: Verbalizes/displays adequate comfort level or patient's stated pain goal  Description: INTERVENTIONS:  - Encourage pt to monitor pain and request assistance  - Assess pain using appropriate pain scale  - Administer analgesics based on type and severity of pain and evaluate response  - Implement non-pharmacological measures as appropriate and evaluate response  - Consider cultural and social influences on pain and pain management  - Manage/alleviate anxiety  - Utilize distraction and/or relaxation techniques  - Monitor for opioid side effects  - Notify MD/LIP if interventions unsuccessful or patient reports new pain  - Anticipate increased pain with activity and pre-medicate as appropriate  Outcome: Progressing     Problem: SAFETY ADULT - FALL  Goal: Free from fall injury  Description: INTERVENTIONS:  - Assess pt frequently for physical needs  - Identify cognitive and physical deficits and behaviors that affect risk of falls.  - Clifford fall precautions as indicated by assessment.  - Educate pt/family on patient safety including physical limitations  - Instruct pt to call for assistance with activity based on assessment  - Modify environment to reduce risk of injury  - Provide assistive devices as appropriate  - Consider OT/PT consult to assist with strengthening/mobility  - Encourage toileting schedule  Outcome: Progressing     Problem: DISCHARGE PLANNING  Goal: Discharge to home or other facility with appropriate resources  Description: INTERVENTIONS:  - Identify barriers to discharge w/pt and caregiver  - Include patient/family/discharge partner in  discharge planning  - Arrange for needed discharge resources and transportation as appropriate  - Identify discharge learning needs (meds, wound care, etc)  - Arrange for interpreters to assist at discharge as needed  - Consider post-discharge preferences of patient/family/discharge partner  - Complete POLST form as appropriate  - Assess patient's ability to be responsible for managing their own health  - Refer to Case Management Department for coordinating discharge planning if the patient needs post-hospital services based on physician/LIP order or complex needs related to functional status, cognitive ability or social support system  Outcome: Progressing     Problem: MUSCULOSKELETAL - ADULT  Goal: Return mobility to safest level of function  Description: INTERVENTIONS:  - Assess patient stability and activity tolerance for standing, transferring and ambulating w/ or w/o assistive devices  - Assist with transfers and ambulation using safe patient handling equipment as needed  - Ensure adequate protection for wounds/incisions during mobilization  - Obtain PT/OT consults as needed  - Advance activity as appropriate  - Communicate ordered activity level and limitations with patient/family  Outcome: Progressing

## 2024-08-20 NOTE — CM/SW NOTE
Informed by therapy that pt would benefit from inpatient rehab setting at [facility].  Pt requiring more physical assistance than can be provided by pt's spouse, who uses a wheelchair.      Spoke with Dr Thomason.  Pt currently outpatient in a bed status.  No current medical needs indicating pt appropriate for inpatient status or needing continued medical care for 3 additional overnights.    Met with pt and her  at bedside to discuss DC planning.  Pt's  was present for PT session today and stated he cannot care for pt's needs at home.  Informed them that pt would not have qualifying inpatient stay for Medicare BASSAM coverage.  They could private pay for NH admission and pt's  confirmed he would like to do this.  Plan to send referrals and SW to meet with pt/spouse again at 2pm today to review accepting facilities.    Referral sent to facilities via [employer].  Message sent to [facility] for review.  PASRR completed and queued for review.  / to remain available for support and/or discharge planning.     Shilpa Nguyen, [facility]  Discharge Planner  [phone]

## 2024-08-20 NOTE — PROGRESS NOTES
[facility]   [facility]     Hospitalist Progress Note     Leann Chatman Patient Status:  Outpatient in a Bed    1950 MRN [id number]   Location [facility] 3SW-A Attending Junior Anaya MD   Hosp Day # 0 PCP Anne Burnett MD     Chief Complaint: follow up medical management    Subjective:     Patient was orthostatic when working with PT earlier today, denies dizziness, chest pain, or shortness of breath    Objective:    Review of Systems:   A comprehensive review of systems was completed; pertinent positive and negatives stated in subjective.    Vital signs:  Temp:  [97.4 °F (36.3 °C)-99.1 °F (37.3 °C)] 97.8 °F (36.6 °C)  Pulse:  [60-73] 62  Resp:  [18-26] 18  BP: ()/(45-66) 142/66  SpO2:  [91 %-99 %] 97 %    Physical Exam:    General: No acute distress  Respiratory: No wheezes, no rhonchi  Cardiovascular: S1, S2, regular rate and rhythm  Abdomen: Soft, Non-tender, non-distended, positive bowel sounds  Neuro: No new focal deficits.   Extremities: No edema      Diagnostic Data:    Labs:  Recent Labs   Lab 24  0532   HGB 10.0*       Recent Labs   Lab 24  0532   CREATSERUM 0.49*       Estimated Creatinine Clearance: 77.3 mL/min (A) (based on SCr of 0.49 mg/dL (L)).    No results for input(s): \"TROP\", \"TROPHS\", \"CK\" in the last 168 hours.    No results for input(s): \"PTP\", \"INR\" in the last 168 hours.               Microbiology    No results found for this visit on 24.      Imaging: Reviewed in Epic.    Medications:    Valbenazine Tosylate  1 tablet Oral Daily    glycerin-hypromellose-  1 drop Both Eyes Daily    polypropylene glycol-  1 drop Both Eyes 5x Daily    sennosides  17.2 mg Oral Nightly    docusate sodium  100 mg Oral BID    ferrous sulfate  325 mg Oral Daily with breakfast    aspirin  81 mg Oral BID    ketorolac  15 mg Intravenous Q6H    acetaminophen  650 mg Oral Q4H While awake    traMADol  50 mg Oral 4 times per day    escitalopram  20 mg  Oral Q24H    pantoprazole  40 mg Oral QAM AC    rOPINIRole  0.5 mg Oral Nightly    atorvastatin  10 mg Oral Nightly    LORazepam  0.5 mg Oral TID       Assessment & Plan:      #L Hip OA  S/p L total hip arthoplasty 8/19/2024  Per Ortho  Pain control  PT/OT     #Essential HTN  Hold BP meds for now (norvasc and BB)  Was orthostatic earlier today     #Dyslipdemia  Statin     #CAD  Resume Plavix      #Anxiety  controlled     #Tardive Dyskinesia  PT/OT  Tolerating diet     #Expected blood loss anemia  trend    Beryl Thomason MD    Supplementary Documentation:     Quality:  DVT Mechanical Prophylaxis: NANCIE hose, SCDs, Early ambuation  DVT Pharmacologic Prophylaxis   Medication   None         DVT Pharmacologic prophylaxis: Aspirin 81 mg      Code Status: Prior  Seymour: No urinary catheter in place  Seymour Duration (in days):   Central line:    YELITZA: 8/21/2024    Discharge is dependent on: progress  At this point Ms. Chatman is expected to be discharge to: home    The 21st Century Cures Act makes medical notes like these available to patients in the interest of transparency. Please be advised this is a medical document. Medical documents are intended to carry relevant information, facts as evident, and the clinical opinion of the practitioner. The medical note is intended as peer to peer communication and may appear blunt or direct. It is written in medical language and may contain abbreviations or verbiage that are unfamiliar.

## 2024-08-20 NOTE — CONGREGATE LIVING REVIEW
[facility] Living Authorization    The [facility] Review Committee has reviewed this case and the patient IS APPROVED for discharge to a facility for Short Term Skilled once the following procedure is followed:     - The physician discharge instructions (contained within the MALU note for SNF) must inlcude the below appropriate and approved COVID instructions to the facility    For questions regarding CLRC approval process, please contact the CM assigned to the case.  For questions regarding RN discharge workflow, please contact the unit Clinical Leader.

## 2024-08-20 NOTE — PHYSICAL THERAPY NOTE
PHYSICAL THERAPY TREATMENT NOTE - INPATIENT    Room Number: 352/352-A     Session: 1     Number of Visits to Meet Established Goals: 3    Presenting Problem: s/p L BOLIVAR  Co-Morbidities : h/o CVA    ASSESSMENT   Patient demonstrates fair progress this session, goals remain in progress.    Patient continues to function below baseline with bed mobility, transfers, gait, stair negotiation, standing prolonged periods, and performing household tasks. Contributing factors to remaining limitations include decreased functional strength, decreased endurance/aerobic capacity, impaired standing  balance, medical status, and +OH  .  Next session anticipate patient to progress transfers and gait.  Physical Therapy will continue to follow patient for duration of hospitalization.    Patient continues to benefit from continued skilled PT services: to promote return to prior level of function and safety with continuous assistance and gradual rehabilitative therapy .    PLAN  PT Treatment Plan: Bed mobility;Body mechanics;Endurance;Energy conservation;Patient education;Family education;Gait training;Range of motion;Strengthening;Stair training;Transfer training;Balance training  Rehab Potential : Fair  Frequency (Obs):  (2-3x/week)    CURRENT GOALS       Goal #1 Patient is able to demonstrate supine - sit EOB @ level: modified independent      Goal #2 Patient is able to demonstrate transfers Sit to/from Stand at assistance level: supervision      Goal #3 Patient is able to ambulate 50 feet with assist device: walker - rolling at assistance level: supervision      Goal #4     Goal #5     Goal #6     Goal Comments: Goals established on 8/19/2024 8/20/2024 all goals ongoing     SUBJECTIVE  \"I'm dizzy\"     OBJECTIVE  Precautions: Bed/chair alarm    WEIGHT BEARING RESTRICTION  Weight Bearing Restriction: L lower extremity           L Lower Extremity: Weight Bearing as Tolerated    PAIN ASSESSMENT   Rating: Unable to rate  Location:  L hip  Management Techniques: Activity promotion;Body mechanics;Breathing techniques;Relaxation;Repositioning    BALANCE                                                                                                                       Static Sitting: Fair  Dynamic Sitting: Fair -           Static Standing: Poor +  Dynamic Standing: Poor +    ACTIVITY TOLERANCE                         O2 WALK         AM-PAC '6-Clicks' INPATIENT SHORT FORM - BASIC MOBILITY  How much difficulty does the patient currently have...  Patient Difficulty: Turning over in bed (including adjusting bedclothes, sheets and blankets)?: A Little   Patient Difficulty: Sitting down on and standing up from a chair with arms (e.g., wheelchair, bedside commode, etc.): A Little   Patient Difficulty: Moving from lying on back to sitting on the side of the bed?: A Little   How much help from another person does the patient currently need...   Help from Another: Moving to and from a bed to a chair (including a wheelchair)?: A Little   Help from Another: Need to walk in hospital room?: A Lot   Help from Another: Climbing 3-5 steps with a railing?: Total       AM-PAC Score:  Raw Score: 15   Approx Degree of Impairment: 57.7%   Standardized Score (AM-PAC Scale): 39.45   CMS Modifier (G-Code): CK    FUNCTIONAL ABILITY STATUS  Gait Assessment   Functional Mobility/Gait Assessment  Gait Assistance: Minimum assistance;Moderate assistance  Distance (ft): 2x2  Assistive Device: Rolling walker  Pattern: L Decreased stance time    Skilled Therapy Provided  Pt presents in semi sup, spouse present initially and leaving during session.   Therapist educated pt on safe mobility , use of RW, and sequencing for t/f  Increased time provided, pt reports dizziness.   O2 and BP monitored throughout.   Pt maintains O2 sats WNL on RA and is +OH  Pt requires min/mod A for t/f bed>chair>commode>chair c RW   One minor LOB c evidence of buckle RLE , may be component of TD.   RN  updated on above.  All pt questions/concerns addressed.   Bed Mobility:  Rolling: nt    Supine<>Sit: mod A    Sit<>Supine: nt      Transfer Mobility:  Sit<>Stand: CGA    Stand<>Sit: CGA    Gait: min/mod A , assist c RW -pt has rollator at home    Therapist's Comments:   139/57 seated  115/47 standing  122/51 sp t/f  109/51 s/p t/f to commode  Pt symptomatic throughout, cued for gaze stabilization     O2 sats 93% RA  THERAPEUTIC EXERCISES  Lower Extremity Ankle pumps  Knee extension  LAQ     Upper Extremity      Position Sitting     Repetitions   10   Sets   1     Patient End of Session: Up in chair;Needs met;Call light within reach;RN aware of session/findings;All patient questions and concerns addressed;SCDs in place;Alarm set    PT Session Time: 40 minutes  Gait Training: 10 minutes  Therapeutic Activity: 20 minutes  Therapeutic Exercise: 10 minutes   Neuromuscular Re-education:  minutes

## 2024-08-20 NOTE — OCCUPATIONAL THERAPY NOTE
OCCUPATIONAL THERAPY EVALUATION - INPATIENT     Room Number: 352/352-A  Evaluation Date: 8/20/2024  Type of Evaluation: Initial  Presenting Problem: s/p L BOLIVAR 8/19/24    Physician Order: IP Consult to Occupational Therapy  Reason for Therapy: ADL/IADL Dysfunction and Discharge Planning    OCCUPATIONAL THERAPY ASSESSMENT   Patient is currently functioning below baseline with toileting, bathing, lower body dressing, bed mobility, transfers, static standing balance, dynamic standing balance, and functional standing tolerance. Prior to admission, patient's baseline is independent except assist for shower transfers.  Patient is requiring grossly min-mod assist for functional transfers, mod-max assist for LB/standing ADLs as a result of the following impairments: decreased functional strength, decreased functional reach, decreased endurance, impaired standing balance, and decreased muscular endurance. Occupational Therapy will continue to follow for duration of hospitalization.    Patient will benefit from continued skilled OT Services to promote return to prior level of function and safety with continuous assistance and gradual rehabilitative therapy      History Related to Current Admission: Patient is a 74 year old female admitted on 8/19/2024 with Presenting Problem: s/p L BOLIVAR 8/19/24. Co-Morbidities : tardic dyskinesia, anxiety, depression, CVA, HTN, CAD    WEIGHT BEARING RESTRICTION  Weight Bearing Restriction: L lower extremity           L Lower Extremity: Weight Bearing as Tolerated    Recommendations for nursing staff:   Transfers: 1-person  Toileting location: commode    EVALUATION SESSION:  Patient Start of Session: supine    FUNCTIONAL TRANSFER ASSESSMENT  Sit to Stand: Edge of Bed; Chair  Edge of Bed: Minimal Assist  Chair: Minimal Assist  Commode Transfer: Minimal Assist    BED MOBILITY  Supine to Sit : Moderate Assist    BALANCE ASSESSMENT     FUNCTIONAL ADL ASSESSMENT  LB Dressing Seated: Minimal Assist  (min assist to don pullups to knees, introduced education on AE for LB dressing)  LB Dressing Standing: Moderate Assist (CGA for steadying plus min assist to don fully to waist)  Toileting Seated: Supervision  Toileting Standing: Moderate Assist (CGA for steadying plus min assist to don fully to waist)      ACTIVITY TOLERANCE:   Orthostatic, symptomatic  BPs monitored:  139/57 sitting EOB, dizzy  115/47 standing, dizziness worsened  109/51 chair end of session                         O2 SATURATIONS  Oxygen Therapy  SPO2% on Room Air at Rest: 94    COGNITION  Overall Cognitive Status:  WFL - within functional limits    Upper Extremity   ROM: within functional limits (B shoulder flexion limited at end range, R worse than L, reports baseline but functional)  Strength: within functional limits     EDUCATION PROVIDED  Patient: Role of Occupational Therapy; Functional Transfer Techniques; Fall Prevention; Weight Bear Status; Compensatory ADL Techniques  Patient's Response to Education: Verbalized Understanding; Requires Further Education ( present part of session)    Equipment used: RW  Demonstrates functional use, Would benefit from additional trial      Therapist comments: Patient motivated and participatory, requiring grossly min-mod assist for functional transfers, mod-max assist for LB/standing ADLs this session. Patient's activity limited this session by dizziness, orthostatic hypotension; tolerated bed mobility mod assist, transfer to chair mod assist with increased time and RW; after seated rest break tolerated steps to/from commode with min-mod assist for balance/RW management and mod assist for LB dressing/toileting. Will continue to follow.     Patient End of Session: Up in chair;Needs met;Call light within reach;RN aware of session/findings;All patient questions and concerns addressed;SCDs in place;Alarm set    OCCUPATIONAL PROFILE    HOME SITUATION  Type of Home: House  Home Layout: Multi-level  Lives  With: Spouse    Toilet and Equipment: Comfort height toilet (safety frame)  Shower/Tub and Equipment: Walk-in shower;Shower chair;Grab bar  Other Equipment: None    Occupation/Status: retired  Hand Dominance: Right     Patient Regularly Uses: Glasses    Prior Level of Function: Patient reports independent in all BADLs and functional mobility via rollator prior to admission.  reports health issues of his own and states will be unable to assist, left in wheelchair during session.     SUBJECTIVE   \"My  won't be able to help me.\"    PAIN ASSESSMENT  Ratin  Location: denies       OBJECTIVE  Precautions: Bed/chair alarm  Fall Risk: High fall risk      ASSESSMENTS    AM-PAC ‘6-Clicks’ Inpatient Daily Activity Short Form  -   Putting on and taking off regular lower body clothing?: A Lot  -   Bathing (including washing, rinsing, drying)?: A Lot  -   Toileting, which includes using toilet, bedpan or urinal? : A Lot  -   Putting on and taking off regular upper body clothing?: A Little  -   Taking care of personal grooming such as brushing teeth?: A Little  -   Eating meals?: None    AM-PAC Score:  Score: 16  Approx Degree of Impairment: 53.32%  Standardized Score (AM-PAC Scale): 35.96    ADDITIONAL TESTS     NEUROLOGICAL FINDINGS      COGNITION ASSESSMENTS       PLAN  OT Treatment Plan: Balance activities;ADL training;Functional transfer training;Endurance training;Patient/Family education;Patient/Family training;Compensatory technique education  Rehab Potential : Good  Frequency: 3-5x/week  Number of Visits to Meet Established Goals: 4    ADL GOALS   Patient will perform Lower Body Dressing with supervision  Patient will perform Toileting with supervision    FUNCTIONAL TRANSFER GOALS   Patient will transfer Supine to Sit with supervision  Patient will transfer Sit to Supine with supervision  Patient will transfer to Toilet with supervision    Patient Evaluation Complexity Level:   Occupational  Profile/Medical History LOW - Brief history including review of medical or therapy records    Specific performance deficits impacting engagement in ADL/IADL LOW  1 - 3 performance deficits    Client Assessment/Performance Deficits LOW - No comorbidities nor modifications of tasks    Clinical Decision Making LOW - Analysis of occupational profile, problem-focused assessments, limited treatment options    Overall Complexity LOW     OT Session Time: 45 minutes  Self-Care Home Management: 15 minutes  Therapeutic Activity: 10 minutes

## 2024-08-20 NOTE — CM/SW NOTE
08/19/24 1900   CM/SW Referral Data   Referral Source    Reason for Referral Discharge planning     HOME SITUATION  Type of Home: House   Home Layout: Multi-level     Lives With: Spouse  Patient Owned Equipment: Rolling walker     Prior Level of [place]: Pt and spouse report pt ambulates with rollator.  Reports last fall ~2 months prior.  Pt's spouse reports they have a \"non-medical\" caregiver who comes 4x/week.  Pt's spouse reports he falls as well and Is unable to assist the patient.    Residential HH chosen during pre-op office visit. No additional needs identified at this time. RN to contact  if needs arise.

## 2024-08-20 NOTE — PROGRESS NOTES
Progress Note    Patient: Leann Chatman  Medical record #: [id number]    Leann Chatman is a 74 year old  female who is POD #1 left BOLIVAR.  The patient's main complaint today is surgical pain at the operative site. Denies paresthesias/CP/SOA. Patient has been working with physical therapy.      Hospital Problem List:  Active Problems:    Benign essential HTN    Dyslipidemia    Anxiety      Current Medications:   lactated ringers infusion   Intravenous Continuous    [COMPLETED] tranexamic acid in sodium chloride 0.7% (Cyklokapron) 1000 mg/100mL infusion premix 1,000 mg  1,000 mg Intravenous Once    sodium chloride 0.9 % IV bolus 500 mL  500 mL Intravenous Once PRN    sennosides (Senokot) tab 17.2 mg  17.2 mg Oral Nightly    docusate sodium (Colace) cap 100 mg  100 mg Oral BID    polyethylene glycol (PEG 3350) (Miralax) 17 g oral packet 17 g  17 g Oral Daily PRN    magnesium hydroxide (Milk of Magnesia) 400 MG/5ML oral suspension 30 mL  30 mL Oral Daily PRN    bisacodyl (Dulcolax) 10 MG rectal suppository 10 mg  10 mg Rectal Daily PRN    fleet enema (Fleet) 7-19 GM/118ML rectal enema 133 mL  1 enema Rectal Once PRN    ondansetron (Zofran) 4 MG/2ML injection 4 mg  4 mg Intravenous Q6H PRN    metoclopramide (Reglan) 5 mg/mL injection 10 mg  10 mg Intravenous Q8H PRN    [] diphenhydrAMINE (Benadryl) 50 mg/mL  injection 25 mg  25 mg Intravenous Once PRN    diphenhydrAMINE (Benadryl) cap/tab 25 mg  25 mg Oral Q4H PRN    Or    diphenhydrAMINE (Benadryl) 50 mg/mL  injection 12.5 mg  12.5 mg Intravenous Q4H PRN    ferrous sulfate DR tab 325 mg  325 mg Oral Daily with breakfast    aspirin DR tab 81 mg  81 mg Oral BID    [COMPLETED] vancomycin (Vancocin) 1,000 mg in sodium chloride 0.9% 250 mL IVPB-ADDV  15 mg/kg Intravenous Once    tiZANidine (Zanaflex) partial tab 1 mg  1 mg Oral Q6H PRN    dexAMETHasone PF (Decadron) 10 MG/ML injection 8 mg  8 mg Intravenous Once    oxyCODONE immediate release partial tab 2.5 mg  2.5  mg Oral Q4H PRN    Or    oxyCODONE immediate release tab 5 mg  5 mg Oral Q4H PRN    HYDROmorphone (Dilaudid) 1 MG/ML injection 0.2 mg  0.2 mg Intravenous Q2H PRN    Or    HYDROmorphone (Dilaudid) 1 MG/ML injection 0.4 mg  0.4 mg Intravenous Q2H PRN    ketorolac (Toradol) 30 MG/ML injection 15 mg  15 mg Intravenous Q6H    acetaminophen (Tylenol) tab 650 mg  650 mg Oral Q4H While awake    traMADol (Ultram) tab 50 mg  50 mg Oral 4 times per day    [COMPLETED] acetaminophen (Tylenol) tab 650 mg  650 mg Oral Once    escitalopram (Lexapro) tab 20 mg  20 mg Oral Q24H    pantoprazole (Protonix) DR tab 40 mg  40 mg Oral QAM AC    rOPINIRole (Requip) tab 0.5 mg  0.5 mg Oral Nightly    atorvastatin (Lipitor) tab 10 mg  10 mg Oral Nightly    LORazepam (Ativan) tab 0.5 mg  0.5 mg Oral TID    [COMPLETED] povidone-iodine (Betadine) 10 % 17.5 mL in sodium chloride 0.9 % 500 mL irrigation   Irrigation Once (Intra-Op)    [COMPLETED] clonidine/epinephrine/ropivacaine/ketorolac in 0.9% NaCl 60 mL pain cocktail syringe for hip arthroplasty   Infiltration Once (Intra-Op)         Input/Output:    Intake/Output Summary (Last 24 hours) at 8/20/2024 0748  Last data filed at 8/20/2024 0359  Gross per 24 hour   Intake 220 ml   Output 825 ml   Net -605 ml       Vital signs:  Blood pressure 110/51, pulse 60, temperature 97.5 °F (36.4 °C), temperature source Oral, resp. rate 18, height 5' 2\" (1.575 m), weight 127 lb (57.6 kg), SpO2 92%.    Physical Exam:     left Leg:  Dressing: clean and dry  Able to dorsiflex ankle and move toes  Sensation grossly intact to light touch throughout  Distal pulses intact  No calf swelling  Dean's sign negative  Compartments soft  No signs or symptoms of DVT or compartment syndrome      Labs:   Lab Results   Component Value Date    HGB 10.0 08/20/2024    HCT 28.6 08/20/2024    CREATSERUM 0.49 08/20/2024         Assessment: 74 year old  female POD #1 left BOLIVAR    Plan:    Hgb - 10.0 - acute blood loss anemia,  consistent with post op changes, stable/asymptomatic  Rebekah-op Glucose Goal control <140  TEDs, SCDs, IS  mobilize with PT, WBAT on operative leg  pain controlled with meds  According to CHEST and AAOS guidelines, ASA EC 81 mgpo bid for DVT prophylaxis due to bleeding concerns ok to resume preop plavix today  Disposition: plan discharge today if doing well with PT, consider home vs rehab if needed    Follow up with Dr. Anaya in 2 weeks    Followed by Physician group for medical conditions to include Active Problems:    Benign essential HTN    Dyslipidemia    Anxiety        Signed:  Junior Anaya MD  8/20/2024  7:48 AM

## 2024-08-21 VITALS
TEMPERATURE: 98 F | BODY MASS INDEX: 23.37 KG/M2 | HEART RATE: 81 BPM | DIASTOLIC BLOOD PRESSURE: 53 MMHG | RESPIRATION RATE: 16 BRPM | HEIGHT: 62 IN | OXYGEN SATURATION: 92 % | WEIGHT: 127 LBS | SYSTOLIC BLOOD PRESSURE: 136 MMHG

## 2024-08-21 PROBLEM — Z96.649 S/P TOTAL HIP ARTHROPLASTY: Status: ACTIVE | Noted: 2024-08-21

## 2024-08-21 LAB
BASOPHILS # BLD AUTO: 0.01 X10(3) UL (ref 0–0.2)
BASOPHILS NFR BLD AUTO: 0.1 %
EOSINOPHIL # BLD AUTO: 0 X10(3) UL (ref 0–0.7)
EOSINOPHIL NFR BLD AUTO: 0 %
ERYTHROCYTE [DISTWIDTH] IN BLOOD BY AUTOMATED COUNT: 13.8 %
HCT VFR BLD AUTO: 28.5 %
HGB BLD-MCNC: 9.9 G/DL
IMM GRANULOCYTES # BLD AUTO: 0.04 X10(3) UL (ref 0–1)
IMM GRANULOCYTES NFR BLD: 0.3 %
LYMPHOCYTES # BLD AUTO: 1.11 X10(3) UL (ref 1–4)
LYMPHOCYTES NFR BLD AUTO: 9.2 %
MCH RBC QN AUTO: 30.8 PG (ref 26–34)
MCHC RBC AUTO-ENTMCNC: 34.7 G/DL (ref 31–37)
MCV RBC AUTO: 88.8 FL
MONOCYTES # BLD AUTO: 0.93 X10(3) UL (ref 0.1–1)
MONOCYTES NFR BLD AUTO: 7.7 %
NEUTROPHILS # BLD AUTO: 9.96 X10 (3) UL (ref 1.5–7.7)
NEUTROPHILS # BLD AUTO: 9.96 X10(3) UL (ref 1.5–7.7)
NEUTROPHILS NFR BLD AUTO: 82.7 %
PLATELET # BLD AUTO: 144 10(3)UL (ref 150–450)
RBC # BLD AUTO: 3.21 X10(6)UL
WBC # BLD AUTO: 12.1 X10(3) UL (ref 4–11)

## 2024-08-21 PROCEDURE — 99232 SBSQ HOSP IP/OBS MODERATE 35: CPT | Performed by: HOSPITALIST

## 2024-08-21 RX ORDER — OXYCODONE HYDROCHLORIDE 5 MG/1
TABLET ORAL EVERY 4 HOURS PRN
Qty: 10 TABLET | Refills: 0 | Status: SHIPPED | OUTPATIENT
Start: 2024-08-21

## 2024-08-21 RX ORDER — LORAZEPAM 0.5 MG/1
0.5 TABLET ORAL 3 TIMES DAILY
Qty: 10 TABLET | Refills: 0 | Status: SHIPPED | OUTPATIENT
Start: 2024-08-21

## 2024-08-21 NOTE — PLAN OF CARE
Patient A & O x4. VSS, on RA. Dressing to hip is clean, dry and intact. Gel ice wrap applied. Spanda  in place. C/o mod-severe pain, controlled with PO pain mediations. Up with walker and gait belt. Safety measures in place. Instructed to use call light.

## 2024-08-21 NOTE — CM/SW NOTE
Met with pt/spouse to discuss DC planning for private pay NH admission.  Dr Aparicio present at bedside and confirmed pt cleared for DC today.  Provided pt/spouse with information about 2 additional accepting NH facilities.  Reviewed options and pt/spouse confirmed preference for [facility].  They are agreeable with DC plan for today.    Spoke with Lydia from [facility] who confirmed pt can be accepted for admission today.  She requested 3pm DC.  [facility] transport scheduled for 3pm.  PCS form completed and available for RN to print.  Will update pt/spouse and inform them of costs for transport.  Updated pt's RN.  / to remain available for support and/or discharge planning.     [facility]  [phone]    [facility]  [phone]    Shilpa Nguyen, [facility]  Discharge Planner  [phone]

## 2024-08-21 NOTE — PLAN OF CARE
NURSING DISCHARGE NOTE    Discharged Home via Wheelchair.  Accompanied by Support staff  Belongings Taken by patient/family.    DC to [facility], report called to RN. DC via gloria, Felisa at bedside.

## 2024-08-21 NOTE — OCCUPATIONAL THERAPY NOTE
OCCUPATIONAL THERAPY TREATMENT NOTE - INPATIENT     Room Number: 352/352-A  Session: 1  Number of Visits to Meet Established Goals: 4    Presenting Problem: s/p L BOLIVAR 8/19/24      ASSESSMENT   Patient demonstrates fair progress this session, goals remain in progress.    Patient continues to function below baseline with toileting, bathing, lower body dressing, bed mobility, transfers, static standing balance, and dynamic standing balance.   Contributing factors to remaining limitations include decreased functional strength, decreased functional reach, decreased endurance, pain, and impaired standing balance.  Next session anticipate patient to progress toileting, lower body dressing, bed mobility, transfers, and dynamic standing balance.  Occupational Therapy will continue to follow patient for duration of hospitalization.    Patient continues to benefit from continued skilled OT services: to promote return to prior level of function and safety with continuous assistance and gradual rehabilitative therapy.        OT Device Recommendations: TBD      History Related to Current Admission: Patient is a 74 year old female admitted on 8/19/2024 with Presenting Problem: s/p L BOLIVAR 8/19/24. Co-Morbidities : tardic dyskinesia, anxiety, depression, CVA, HTN, CAD    WEIGHT BEARING RESTRICTION  Weight Bearing Restriction: L lower extremity           L Lower Extremity: Weight Bearing as Tolerated    Recommendations for nursing staff:   Transfers: 1-person  Toileting location: toilet    TREATMENT SESSION:  Patient Start of Session: supine    FUNCTIONAL TRANSFER ASSESSMENT  Sit to Stand: Edge of Bed; Chair  Edge of Bed: Minimal Assist  Chair: Not Tested  Toilet Transfer: Minimal Assist  Commode Transfer: Not Tested    BED MOBILITY  Supine to Sit : Minimal Assist    BALANCE ASSESSMENT     FUNCTIONAL ADL ASSESSMENT  LB Dressing Seated: Not Tested  LB Dressing Standing: Not Tested  Toileting Seated: Not Tested  Toileting Standing:  Moderate Assist (CGA for steadying, min assist for clothing management to/from waist, assist for thoroughness of rear pereicare)      ACTIVITY TOLERANCE:                          O2 SATURATIONS       EDUCATION PROVIDED  Patient: Role of Occupational Therapy; Functional Transfer Techniques; Fall Prevention; Weight Bear Status; Compensatory ADL Techniques  Patient's Response to Education: Verbalized Understanding; Requires Further Education      Equipment used: RW  Demonstrates functional use, Would benefit from additional trial      Therapist comments: Patient received in supine, agreeable to therapy, requesting to use bathroom; patient grossly min assist for bed mobility, min assist for standing and functional mobility to bathroom via RW, increased time, cues to remain closer to RW; min assist for toilet transfer with cues for hand placement, mod assist for toileting with increased time; transport arrived, patient performed mobility to wheelchair at doorway with RW, min assist, safety cues and increased time, min assist for transfer to wheelchair.     Patient End of Session: Up in chair;Needs met;RN aware of session/findings;All patient questions and concerns addressed;Other (Comment) (in wheelchair with transport)    SUBJECTIVE  \"I'm feeling better today\"    PAIN ASSESSMENT  Rating: Unable to rate  Location: mild, L hip/groin  Management Techniques: Activity promotion;Repositioning;Body mechanics     OBJECTIVE  Precautions: Bed/chair alarm    AM-PAC ‘6-Clicks’ Inpatient Daily Activity Short Form  -   Putting on and taking off regular lower body clothing?: A Lot  -   Bathing (including washing, rinsing, drying)?: A Lot  -   Toileting, which includes using toilet, bedpan or urinal? : A Lot  -   Putting on and taking off regular upper body clothing?: A Little  -   Taking care of personal grooming such as brushing teeth?: A Little  -   Eating meals?: None    AM-PAC Score:  Score: 16  Approx Degree of Impairment:  53.32%  Standardized Score (AM-PAC Scale): 35.96    PLAN  OT Treatment Plan: Balance activities;ADL training;Functional transfer training;Endurance training;Patient/Family education;Patient/Family training;Compensatory technique education  Rehab Potential : Good  Frequency: 3-5x/week    Goals in progress 8/21  ADL GOALS   Patient will perform Lower Body Dressing with supervision  Patient will perform Toileting with supervision    FUNCTIONAL TRANSFER GOALS   Patient will transfer Supine to Sit with supervision  Patient will transfer Sit to Supine with supervision  Patient will transfer to Toilet with supervision    OT Session Time: 23 minutes  Self-Care Home Management: 15 minutes  Therapeutic Activity: 8 minutes

## 2024-08-21 NOTE — PROGRESS NOTES
[facility]   part of [facility]     Hospitalist Progress Note     Leann Chatman Patient Status:  Outpatient in a Bed    1950 MRN [id number]   Location [facility] 3SW-A Attending Junior Anaya MD   Hosp Day # 0 PCP Anne Burnett MD     Chief Complaint: follow up medical management    Subjective:     Patient seen and examined. Feels weak. Pain waxes and wanes.     Objective:    Review of Systems:   A comprehensive review of systems was completed; pertinent positive and negatives stated in subjective.    Vital signs:  Temp:  [97.5 °F (36.4 °C)-99 °F (37.2 °C)] 98 °F (36.7 °C)  Pulse:  [68-81] 81  Resp:  [16-18] 16  BP: (124-149)/(43-73) 136/53  SpO2:  [92 %-94 %] 92 %    Physical Exam:    General: No acute distress  Respiratory: No wheezes, no rhonchi  Cardiovascular: S1, S2, regular rate and rhythm  Abdomen: Soft, Non-tender, non-distended, positive bowel sounds  Neuro: No new focal deficits.   Extremities: No edema      Diagnostic Data:    Labs:  Recent Labs   Lab 24  0532 24  0505   WBC  --  12.1*   HGB 10.0* 9.9*   MCV  --  88.8   PLT  --  144.0*       Recent Labs   Lab 24  0532   CREATSERUM 0.49*       Estimated Creatinine Clearance: 77.3 mL/min (A) (based on SCr of 0.49 mg/dL (L)).    No results for input(s): \"TROP\", \"TROPHS\", \"CK\" in the last 168 hours.    No results for input(s): \"PTP\", \"INR\" in the last 168 hours.               Microbiology    No results found for this visit on 24.      Imaging: Reviewed in Epic.    Medications:    Valbenazine Tosylate  1 tablet Oral Daily    glycerin-hypromellose-  1 drop Both Eyes Daily    polypropylene glycol-  1 drop Both Eyes 5x Daily    clopidogrel  75 mg Oral Daily    sennosides  17.2 mg Oral Nightly    docusate sodium  100 mg Oral BID    ferrous sulfate  325 mg Oral Daily with breakfast    aspirin  81 mg Oral BID    ketorolac  15 mg Intravenous Q6H    acetaminophen  650 mg Oral Q4H While awake    traMADol   50 mg Oral 4 times per day    escitalopram  20 mg Oral Q24H    pantoprazole  40 mg Oral QAM AC    rOPINIRole  0.5 mg Oral Nightly    atorvastatin  10 mg Oral Nightly    LORazepam  0.5 mg Oral TID       Assessment & Plan:      #L Hip OA  S/p L total hip arthoplasty 8/19/2024  Per Ortho  Pain control  PT/OT     #Essential HTN  Hold home BP meds  Can be re-assessed at OP f/u      #Dyslipdemia  Statin     #CAD  Plavix      #Anxiety  controlled     #Tardive Dyskinesia  PT/OT  Tolerating diet     #Expected blood loss anemia  Monitor    #Disposition  -Stable for DC     Clarke Aparicio,     Supplementary Documentation:     Quality:  DVT Mechanical Prophylaxis: NANCIE hose, KARENs, Early ambuation  DVT Pharmacologic Prophylaxis   Medication   None         DVT Pharmacologic prophylaxis: Aspirin 81 mg      Code Status: Prior  [place]: No urinary catheter in place  [place] Duration (in days):   Central line:    YELITZA: 8/21/2024    Discharge is dependent on: progress  At this point Ms. Chatman is expected to be discharge to: home    The 21st Century Cures Act makes medical notes like these available to patients in the interest of transparency. Please be advised this is a medical document. Medical documents are intended to carry relevant information, facts as evident, and the clinical opinion of the practitioner. The medical note is intended as peer to peer communication and may appear blunt or direct. It is written in medical language and may contain abbreviations or verbiage that are unfamiliar.

## 2024-08-21 NOTE — PHYSICAL THERAPY NOTE
PHYSICAL THERAPY TREATMENT NOTE - INPATIENT    Room Number: 352/352-A     Session: 2    Number of Visits to Meet Established Goals: 3    Presenting Problem: s/p L BOLIVAR  Co-Morbidities : tardic dyskinesia, anxiety, depression, CVA, HTN, CAD    ASSESSMENT   Patient demonstrates fair progress this session, goals remain in progress.    Patient continues to function below baseline with bed mobility, transfers, gait, stair negotiation, standing prolonged periods, and performing household tasks. Contributing factors to remaining limitations include decreased functional strength, decreased endurance/aerobic capacity, impaired standing  balance, medical status, and +OH  .  Next session anticipate patient to progress transfers and gait.  Physical Therapy will continue to follow patient for duration of hospitalization.    Patient continues to benefit from continued skilled PT services: to promote return to prior level of function and safety with continuous assistance and gradual rehabilitative therapy .    PLAN  PT Treatment Plan: Bed mobility;Body mechanics;Endurance;Energy conservation;Patient education;Family education;Gait training;Range of motion;Strengthening;Stair training;Transfer training;Balance training  Rehab Potential : Fair  Frequency (Obs):  (2-3x/week)    CURRENT GOALS       Goal #1 Patient is able to demonstrate supine - sit EOB @ level: modified independent      Goal #2 Patient is able to demonstrate transfers Sit to/from Stand at assistance level: supervision      Goal #3 Patient is able to ambulate 50 feet with assist device: walker - rolling at assistance level: supervision      Goal #4     Goal #5     Goal #6     Goal Comments: Goals established on 8/19/2024 8/21/2024 all goals ongoing     SUBJECTIVE  \"I want to walk\"    OBJECTIVE  Precautions: Bed/chair alarm    WEIGHT BEARING RESTRICTION  Weight Bearing Restriction: L lower extremity           L Lower Extremity: Weight Bearing as  Tolerated    PAIN ASSESSMENT   Ratin  Location: anterior proximal  quad, incision  Management Techniques: Activity promotion;Body mechanics;Breathing techniques;Relaxation;Repositioning    BALANCE                                                                                                                       Static Sitting: Fair -  Dynamic Sitting: Poor +           Static Standing: Poor  Dynamic Standing: Poor    ACTIVITY TOLERANCE                         O2 WALK         AM-PAC '6-Clicks' INPATIENT SHORT FORM - BASIC MOBILITY  How much difficulty does the patient currently have...  Patient Difficulty: Turning over in bed (including adjusting bedclothes, sheets and blankets)?: A Little   Patient Difficulty: Sitting down on and standing up from a chair with arms (e.g., wheelchair, bedside commode, etc.): A Little   Patient Difficulty: Moving from lying on back to sitting on the side of the bed?: A Little   How much help from another person does the patient currently need...   Help from Another: Moving to and from a bed to a chair (including a wheelchair)?: A Little   Help from Another: Need to walk in hospital room?: A Lot   Help from Another: Climbing 3-5 steps with a railing?: Total       AM-PAC Score:  Raw Score: 15   Approx Degree of Impairment: 57.7%   Standardized Score (AM-PAC Scale): 39.45   CMS Modifier (G-Code): CK    FUNCTIONAL ABILITY STATUS  Gait Assessment   Functional Mobility/Gait Assessment  Gait Assistance: Minimum assistance;Moderate assistance  Distance (ft): 25,15  Assistive Device: Rolling walker  Pattern: L Decreased stance time (R lean)    Skilled Therapy Provided  Pt presents in semi sup  Therapist educated pt on safe mobility , use of RW, and sequencing for t/f  Increased time provided, pt denies dizziness.    BP monitored throughout.       Pt gait trained as noted .. Seated rest. R lean requires min-mod A aspt fatigues  All pt questions/concerns addressed.   Bed Mobility:  Rolling:  nt    Supine<>Sit: min A    Sit<>Supine: nt      Transfer Mobility:  Sit<>Stand: min a    Stand<>Sit: CGA    Gait: min A to mod A, assist c RW     Therapist's Comments:     /52 seated  126/48 standing   117/50 sp amb    THERAPEUTIC EXERCISES  Lower Extremity Ankle pumps  Knee extension  LAQ     Upper Extremity      Position Sitting     Repetitions   10   Sets   1     Patient End of Session: Up in chair;Needs met;Call light within reach;RN aware of session/findings;All patient questions and concerns addressed;Alarm set    PT Session Time: 30 minutes  Gait Training: 15 minutes  Therapeutic Activity: 15 minutes  Therapeutic Exercise:  minutes   Neuromuscular Re-education:  minutes

## 2024-08-22 NOTE — DISCHARGE SUMMARY
Outpatient Surgery Discharge Summary    Patient: Leann Chatman  Medical Record #: [id number]  Age: 74 year old  : 1950    Admit date: 2024    Discharge date: 2024     Attending physician: Junior Anaya MD    Admission diagnosis: PRIMARY OSTEOARTHRITIS LEFT HIP M16.12  S/P total hip arthroplasty    Discharge diagnosis: PRIMARY OSTEOARTHRITIS LEFT HIP M16.12  S/P total hip arthroplasty    Procedure performed: left total hip arthroplasty    Admission condition: good    Hospital course: The patient was taken to the OR for a planned left total hip arthroplasty. The procedure was tolerated well, there were no complications.  The patient did receive pre-operative antibiotics.  The patient was transferred to the PACU in stable condition.  The patient recovered well from anesthesia in the PACU. The patient was admitted post operative. Physical therapy was consulted and the patient cleared PT for discharge.  Post operative xrays were reviewed and implants  are in good position.  The patient will go home on  ASA BID for DVT prophylaxis and with pain medication.  Patients pain has been well controlled.  Vitals have been stable. H&H is stable.  The patient is tolerating a diet.  Hospital course was uncomplicated.    Discharge condition: good    Disposition: snf    Meds: medicines reconciled and prescriptions signed on chart    Activity: see discharge instruction sheet  Diet: see discharge instruction sheet  Wound Care: see discharge instruction sheet    Follow up: Dr. Anaya in 2-3 weeks    Signed:  Junior Anaya MD  2024  6:41 AM

## 2024-08-26 ENCOUNTER — TELEPHONE (OUTPATIENT)
Dept: FAMILY MEDICINE CLINIC | Facility: CLINIC | Age: 74
End: 2024-08-26

## 2024-08-26 DIAGNOSIS — Z96.649 HISTORY OF HIP REPLACEMENT, UNSPECIFIED LATERALITY: Primary | ICD-10-CM

## 2024-08-26 DIAGNOSIS — M16.12 PRIMARY OSTEOARTHRITIS OF LEFT HIP: ICD-10-CM

## 2024-08-26 DIAGNOSIS — R26.2 DIFFICULTY WALKING: ICD-10-CM

## 2024-08-26 DIAGNOSIS — I10 BENIGN ESSENTIAL HTN: ICD-10-CM

## 2024-08-26 DIAGNOSIS — F41.9 ANXIETY: ICD-10-CM

## 2024-08-26 NOTE — TELEPHONE ENCOUNTER
She will be coming home from rehab and he wants to know what home health company he can get.  He is asking for a call back soon

## 2024-08-26 NOTE — TELEPHONE ENCOUNTER
Daughter Chrystal said that they are pulling the patient out on Wed because the care has not been good. She said that there is an order for [facility] but Chente can't find it. She said that Chrystal spoke with the DON and they are going to order her a pediatric wheelchair and a walker.   Daughter Chrystal asked if Dr Burnett can order [facility] for nursing, OT, and PT.

## 2024-08-26 NOTE — TELEPHONE ENCOUNTER
Patient's  said that the nursing home that patient is in is a complete disaster, she is at the [facility]. He said that he does not want any home nursing or therapy arranged through them. She has used [facility] in the past. Will Dr Burnett order this?

## 2024-08-27 ENCOUNTER — TELEPHONE (OUTPATIENT)
Dept: FAMILY MEDICINE CLINIC | Facility: CLINIC | Age: 74
End: 2024-08-27

## 2024-08-27 NOTE — TELEPHONE ENCOUNTER
PT & OT was going to be ordered for her, daughter forgot to let Dr Burnett's nurse know she also needs nursing care ordered for a wound, dressing needs changed with aqua cell

## 2024-08-29 ENCOUNTER — TELEPHONE (OUTPATIENT)
Dept: FAMILY MEDICINE CLINIC | Facility: CLINIC | Age: 74
End: 2024-08-29

## 2024-08-29 RX ORDER — OMEPRAZOLE 40 MG/1
40 CAPSULE, DELAYED RELEASE ORAL DAILY
Qty: 90 CAPSULE | Refills: 0 | Status: SHIPPED | OUTPATIENT
Start: 2024-08-29

## 2024-08-29 RX ORDER — IPRATROPIUM BROMIDE 42 UG/1
2 SPRAY, METERED NASAL 2 TIMES DAILY
Qty: 60 EACH | Refills: 0 | Status: SHIPPED | OUTPATIENT
Start: 2024-08-29 | End: 2024-09-28

## 2024-08-29 NOTE — TELEPHONE ENCOUNTER
Patient was released from nursing home yesterday.  She needs a home nebulizer with albuterol treatments.  Please call to discuss.

## 2024-08-29 NOTE — TELEPHONE ENCOUNTER
Patient's  said she has had a cough for a couple days with a lot of mucous coming up. Her daughter states that patient always has a lot of mucous. They had been giving her neb treatments twice daily at the nursing home. They said her speech was much more clear after the neb treatments.  The home health nurse is there and will call after he does an assessment of patient.

## 2024-08-29 NOTE — TELEPHONE ENCOUNTER
Chrystal wanted to know if  can order water proof island dressing for her hip from [facility].

## 2024-08-29 NOTE — TELEPHONE ENCOUNTER
Nebulizer from [facility] given to  travis Chatman. Paperwork has been filled out and faxed to [facility].

## 2024-08-29 NOTE — TELEPHONE ENCOUNTER
Per [facility] they cannot bill the insurance for this it is over the counter.  Script sent to [employer] so they can  through the drive through.

## 2024-08-30 RX ORDER — ROPINIROLE 0.5 MG/1
0.5 TABLET, FILM COATED ORAL NIGHTLY
Qty: 90 TABLET | Refills: 0 | Status: SHIPPED | OUTPATIENT
Start: 2024-08-30

## 2024-08-30 NOTE — TELEPHONE ENCOUNTER
ROPINIROLE 0.5 MG Oral Tab     Neurology Medications [id number]/30/2024 08:57 AM   Protocol Details In person appointment or virtual visit in the past 6 mos or appointment in next 3 mos      Last office visit:  4/30/24   No future appointments.  Last filled:  do not show that we filled   Last labs:  7/25/24

## 2024-09-03 ENCOUNTER — TELEPHONE (OUTPATIENT)
Dept: FAMILY MEDICINE CLINIC | Facility: CLINIC | Age: 74
End: 2024-09-03

## 2024-09-03 NOTE — TELEPHONE ENCOUNTER
Chente states he would like to look into patient going to [facility]. Face sheet, last office visit notes faxed to [facility] [phone].

## 2024-09-03 NOTE — TELEPHONE ENCOUNTER
Daughter is calling to check on status of wheelchair & walker.    She wanted to know what  thought of her going to same rehabilitation in [place].  She went there last summer.

## 2024-09-03 NOTE — TELEPHONE ENCOUNTER
Patient's  advised that [facility] again states that they dispensed the nebulizer solution. Chente advised they can start using the nasal spray and we will check on the status at [facility].   advised that we are waiting for Janette from [facility] to call us back.

## 2024-09-03 NOTE — TELEPHONE ENCOUNTER
Daughter Chrystal said that her Mom and her  do not have a caregiver on Friday, Saturday or Sunday. She said that patient's  fell over the weekend but would not go to the ER.  She said her Mom fell back on the toilet over the weekend. Patient was upset that the nebulizer medication was not sent in. She needs it canceled at [facility] and sent to to [facility].   She said she cannot get her Lexapro filled because it is listed as an allergy. She wants that removed because she is not allergic to it.   Chrystal also said they received a letter from [facility] outcome explanation stating that she could have stayed at a nursing facility for 30 days due to evidence of a serious mental illness.   She said the nursing home did not order her a walker or wheelchair. When PT came they were very concerned because patient is at a fall risk.   Chrystal said that she is not safe at home and her  cannot get her up to walk her. Daughter wants her to go to rehab in [place] or [place] as long as it is a rehab facility.

## 2024-09-03 NOTE — TELEPHONE ENCOUNTER
Please call  to discuss medication that was sent to pharmacy last week.  It was solution for nebulizer.

## 2024-09-04 NOTE — TELEPHONE ENCOUNTER
Patient advised to take the box into [facility] to show them that the nasal solution was dispensed.

## 2024-09-04 NOTE — TELEPHONE ENCOUNTER
Chente called back and states that he will touch base with [facility]. They have not decided if patient will be going there even though she has been excepted.

## 2024-09-05 ENCOUNTER — TELEPHONE (OUTPATIENT)
Dept: FAMILY MEDICINE CLINIC | Facility: CLINIC | Age: 74
End: 2024-09-05

## 2024-09-05 NOTE — TELEPHONE ENCOUNTER
Spoke with Eugene.  He would like Bertha to call him back when back in the office regarding the wheelchair and walker.  Patient has not received and is waiting for a phone call back from the  from [facility].

## 2024-09-06 ENCOUNTER — TELEPHONE (OUTPATIENT)
Dept: FAMILY MEDICINE CLINIC | Facility: CLINIC | Age: 74
End: 2024-09-06

## 2024-09-06 NOTE — TELEPHONE ENCOUNTER
NEEDING ORDER FOR PEDIATRIC WHEELCHAIR AND WALKER WITH JUST WHEELS IN FRONT POST IN BACK, WAS WAITING FOR [facility]/DR. SHAH TO RETURN, HOWEVER SHE HAS BEEN HOME FOR A WEEK AND IS REALLY STRUGGLING, SO [facility] IS WONDERING IF SOMEONE CAN CALL IN THE ORDERS FOR [facility] BEFORE THEY RETURN

## 2024-09-09 ENCOUNTER — TELEPHONE (OUTPATIENT)
Dept: FAMILY MEDICINE CLINIC | Facility: CLINIC | Age: 74
End: 2024-09-09

## 2024-09-09 NOTE — TELEPHONE ENCOUNTER
Patient said that since Thursday the bottom number of her blood pressure has been in the 40s and the top number has been 135-150.   She states that her surgeon told her not to go to the nursing home because there are too many germs there.   She said that today she took the Metoprolol and Amlodipine today but held the Amlodipine.   Marlee at the [facility] said they did order the walker and wheelchair last week. It will be delivered to the patient's home this week.

## 2024-09-09 NOTE — TELEPHONE ENCOUNTER
I think we need to run everything through her ; I am delighted she will have her walker soon, but still a huge falls risk. She would be safer in a NH than at home.

## 2024-09-09 NOTE — TELEPHONE ENCOUNTER
Patient's  said that the surgeon told them not to send patient to the nursing home.   advised that the [facility] re-ordered the wheelchair and walker to be delivered to their home.    He said that it would be very difficult for him to get patient in here. He will keep track of her blood pressures and bring them in when he comes in to see Dr Burnett Thursday.

## 2024-09-10 ENCOUNTER — TELEPHONE (OUTPATIENT)
Dept: FAMILY MEDICINE CLINIC | Facility: CLINIC | Age: 74
End: 2024-09-10

## 2024-09-10 DIAGNOSIS — R35.0 URINARY FREQUENCY: Primary | ICD-10-CM

## 2024-09-10 NOTE — TELEPHONE ENCOUNTER
Ritchie at [facility] a nurse is coming out there to see patient tomorrow. Orders for urine culture and Urinalysis faxed to [facility] [phone].

## 2024-09-10 NOTE — TELEPHONE ENCOUNTER
Patient's  advised.  States that he picked up a urine cup and will bring specimen in tomorrow.    Do you want a urine dip and culture or a send out ua and culture?

## 2024-09-10 NOTE — TELEPHONE ENCOUNTER
Chente advised. He states that they use [facility].   She was discharged from [facility] June 28th.   Chente states that her blood pressure was 129/50 then 139/57 yesterday. Today it was 150/72 and 129/57  today.

## 2024-09-10 NOTE — TELEPHONE ENCOUNTER
See other phone note. Home Health will go out and collect the urine tomorrow for culture and send out U/A

## 2024-09-11 ENCOUNTER — TELEPHONE (OUTPATIENT)
Dept: FAMILY MEDICINE CLINIC | Facility: CLINIC | Age: 74
End: 2024-09-11

## 2024-09-11 ENCOUNTER — LABORATORY ENCOUNTER (OUTPATIENT)
Dept: LAB | Age: 74
End: 2024-09-11
Attending: INTERNAL MEDICINE
Payer: MEDICARE

## 2024-09-11 NOTE — TELEPHONE ENCOUNTER
PATIENT WAS SUPPOSED TO BE DISCHARGED TODAY, WITH EVERYTHING GOING ON DOES DR WANT HIM TO KEEP SEEING HER?

## 2024-09-11 NOTE — TELEPHONE ENCOUNTER
Leann is thinking she needs a neck scan/CT. Her neck is really hurting her. Daughter is asking to speak with Bertha.

## 2024-09-11 NOTE — TELEPHONE ENCOUNTER
Antonio from [facility] is dropping off urine specimen for U/A and culture. He said the urine is dark, cloudy, and foul smelling. He said patient has been having kidney pain for 3-4 days. She was afebrile today but states she has been feeling warm. BP was 154/54 but she did not take her blood pressure medication because the diastolic was 40.

## 2024-09-12 ENCOUNTER — TELEPHONE (OUTPATIENT)
Dept: FAMILY MEDICINE CLINIC | Facility: CLINIC | Age: 74
End: 2024-09-12

## 2024-09-12 NOTE — TELEPHONE ENCOUNTER
advised. He said that he feels she is improving. She is ambulating on her own. Advised that she needs to be evlauated for her neck pain. He states that they are waiting for the walker and wheelchair to be delivered.

## 2024-09-13 ENCOUNTER — TELEPHONE (OUTPATIENT)
Dept: FAMILY MEDICINE CLINIC | Facility: CLINIC | Age: 74
End: 2024-09-13

## 2024-09-13 NOTE — TELEPHONE ENCOUNTER
Reviewed with  provider comments from urinalysis 9-11-24 and that culture results are pending, verbalized understanding

## 2024-09-14 ENCOUNTER — TELEPHONE (OUTPATIENT)
Dept: FAMILY MEDICINE CLINIC | Facility: CLINIC | Age: 74
End: 2024-09-14

## 2024-09-14 ENCOUNTER — PATIENT MESSAGE (OUTPATIENT)
Dept: FAMILY MEDICINE CLINIC | Facility: CLINIC | Age: 74
End: 2024-09-14

## 2024-09-14 RX ORDER — SULFAMETHOXAZOLE/TRIMETHOPRIM 800-160 MG
1 TABLET ORAL 2 TIMES DAILY
Qty: 20 TABLET | Refills: 0 | Status: SHIPPED | OUTPATIENT
Start: 2024-09-14 | End: 2024-09-24

## 2024-09-14 NOTE — TELEPHONE ENCOUNTER
Urine culture final result available, not addressed by provider. Will route to Dr. Burnett to address when back in office.

## 2024-09-14 NOTE — TELEPHONE ENCOUNTER
Urine culture was positive for E coli, I sent bactrim to [facility] in [place].  Complete medications and return if not resolved in 3-5 days.    I called pt directly from home.

## 2024-09-16 ENCOUNTER — TELEPHONE (OUTPATIENT)
Dept: FAMILY MEDICINE CLINIC | Facility: CLINIC | Age: 74
End: 2024-09-16

## 2024-09-16 ENCOUNTER — HOME HEALTH CHARGES (OUTPATIENT)
Dept: FAMILY MEDICINE CLINIC | Facility: CLINIC | Age: 74
End: 2024-09-16

## 2024-09-16 DIAGNOSIS — Z47.1 AFTERCARE FOLLOWING JOINT REPLACEMENT SURGERY, UNSPECIFIED JOINT: Primary | ICD-10-CM

## 2024-09-16 DIAGNOSIS — Z96.641 HISTORY OF TOTAL RIGHT HIP REPLACEMENT: Primary | ICD-10-CM

## 2024-09-16 RX ORDER — ALBUTEROL SULFATE 0.83 MG/ML
2.5 SOLUTION RESPIRATORY (INHALATION) EVERY 4 HOURS PRN
Qty: 50 EACH | Refills: 3 | Status: SHIPPED | OUTPATIENT
Start: 2024-09-16 | End: 2024-09-16

## 2024-09-16 RX ORDER — ALBUTEROL SULFATE 0.83 MG/ML
2.5 SOLUTION RESPIRATORY (INHALATION) EVERY 4 HOURS PRN
Qty: 50 EACH | Refills: 3 | Status: SHIPPED | OUTPATIENT
Start: 2024-09-16 | End: 2024-10-19

## 2024-09-16 NOTE — TELEPHONE ENCOUNTER
Patient''s  advised.   He said that he canceled the order for the walker at [facility] and no wheelchair was ordered.  He is asking if Dr Burnett will order the wheelchair and walker from [facility].   Should patient remain off her blood pressure medication?  states her blood pressure has been up and down.

## 2024-09-16 NOTE — TELEPHONE ENCOUNTER
Patient's  advised about the Albuterol. Patient states it is not as effective as the Albuterol through the nebulizer and she ran out.  Patient's  said that patient stopped taking her blood pressure medication 5 days ago. Because the blood pressure had been running low.   BP was 136/54.   Patient said that when she was in the nursing home she was getting a protein supplement, does she need this?. She does drink an Ensure everyday.

## 2024-09-16 NOTE — TELEPHONE ENCOUNTER
Sent al[facility]erol to [facility] and as long as she is eating well, she does not need to supplement more than what she already does. Easy protien foods, eggs, nuts, peans and lean meats.

## 2024-09-16 NOTE — TELEPHONE ENCOUNTER
[facility]

## 2024-09-16 NOTE — TELEPHONE ENCOUNTER
Chente states that patient is ambulating more and does not need to use the [place] [employer].   They still have not received the wheelchair and the walker. Marlee at [facility] states that [facility] just requires a signature from the nurse practitioner. They will fax this.  Patient stopped using the medication for the nebulizer that Dr Burnett prescribed because she likes the Albuterol better. She had some leftover Albuterol from the nursing home but it is almost gone. Should she use the Albuterol inhaler with the Ipratropium?

## 2024-09-16 NOTE — TELEPHONE ENCOUNTER
albuterol (2.5 MG/3ML) 0.083% Inhalation [facility] & SEND TO [facility]

## 2024-09-18 ENCOUNTER — TELEPHONE (OUTPATIENT)
Dept: FAMILY MEDICINE CLINIC | Facility: CLINIC | Age: 74
End: 2024-09-18

## 2024-09-18 RX ORDER — METOPROLOL TARTRATE 25 MG/1
25 TABLET, FILM COATED ORAL 2 TIMES DAILY
Qty: 180 TABLET | Refills: 0 | Status: SHIPPED | OUTPATIENT
Start: 2024-09-18 | End: 2024-12-17

## 2024-09-18 NOTE — TELEPHONE ENCOUNTER
Patient  has been monitoring wife's Blood pressure, calling to report numbers to nurse about 8:30/9am 130/65, at about 11/11:30am 127/53, and 2:30pm 160/50.

## 2024-09-18 NOTE — TELEPHONE ENCOUNTER
Patient's  said that she took the Metorolol last night. He asked if she should be taking it more than once a day. Advised take tonight as directed and this message will be routed to Dr Burnett to address in the morning.

## 2024-09-19 ENCOUNTER — TELEPHONE (OUTPATIENT)
Dept: FAMILY MEDICINE CLINIC | Facility: CLINIC | Age: 74
End: 2024-09-19

## 2024-09-19 NOTE — TELEPHONE ENCOUNTER
Spoke w/spouse. He is aware the walker is ordered w/[facility]. He does not want the wheelchair and says he already told [facility] that yesterday.

## 2024-09-19 NOTE — TELEPHONE ENCOUNTER
She is not eligible for a wheelchair as she was dispensed one through medicare in 2023; the walker was ordered through [facility]

## 2024-09-24 ENCOUNTER — TELEPHONE (OUTPATIENT)
Dept: FAMILY MEDICINE CLINIC | Facility: CLINIC | Age: 74
End: 2024-09-24

## 2024-09-24 ENCOUNTER — LABORATORY ENCOUNTER (OUTPATIENT)
Dept: LAB | Age: 74
End: 2024-09-24
Attending: INTERNAL MEDICINE
Payer: MEDICARE

## 2024-09-24 DIAGNOSIS — I25.759 CORONARY ARTERY DISEASE INVOLVING NATIVE ARTERY OF TRANSPLANTED HEART WITH ANGINA PECTORIS (HCC): Primary | ICD-10-CM

## 2024-09-24 DIAGNOSIS — R06.09 DOE (DYSPNEA ON EXERTION): ICD-10-CM

## 2024-09-24 LAB
ANION GAP SERPL CALC-SCNC: 6 MMOL/L (ref 0–18)
BASOPHILS # BLD AUTO: 0.05 X10(3) UL (ref 0–0.2)
BASOPHILS NFR BLD AUTO: 0.6 %
BUN BLD-MCNC: 32 MG/DL (ref 9–23)
CALCIUM BLD-MCNC: 10.1 MG/DL (ref 8.7–10.4)
CHLORIDE SERPL-SCNC: 93 MMOL/L (ref 98–112)
CO2 SERPL-SCNC: 26 MMOL/L (ref 21–32)
CREAT BLD-MCNC: 0.76 MG/DL
EGFRCR SERPLBLD CKD-EPI 2021: 81 ML/MIN/1.73M2 (ref 60–?)
EOSINOPHIL # BLD AUTO: 0.12 X10(3) UL (ref 0–0.7)
EOSINOPHIL NFR BLD AUTO: 1.3 %
ERYTHROCYTE [DISTWIDTH] IN BLOOD BY AUTOMATED COUNT: 12.7 %
FASTING STATUS PATIENT QL REPORTED: NO
GLUCOSE BLD-MCNC: 101 MG/DL (ref 70–99)
HCT VFR BLD AUTO: 32.5 %
HGB BLD-MCNC: 10.7 G/DL
IMM GRANULOCYTES # BLD AUTO: 0.02 X10(3) UL (ref 0–1)
IMM GRANULOCYTES NFR BLD: 0.2 %
LYMPHOCYTES # BLD AUTO: 1.66 X10(3) UL (ref 1–4)
LYMPHOCYTES NFR BLD AUTO: 18.5 %
MCH RBC QN AUTO: 29.4 PG (ref 26–34)
MCHC RBC AUTO-ENTMCNC: 32.9 G/DL (ref 31–37)
MCV RBC AUTO: 89.3 FL
MONOCYTES # BLD AUTO: 0.65 X10(3) UL (ref 0.1–1)
MONOCYTES NFR BLD AUTO: 7.2 %
NEUTROPHILS # BLD AUTO: 6.49 X10 (3) UL (ref 1.5–7.7)
NEUTROPHILS # BLD AUTO: 6.49 X10(3) UL (ref 1.5–7.7)
NEUTROPHILS NFR BLD AUTO: 72.2 %
OSMOLALITY SERPL CALC.SUM OF ELEC: 267 MOSM/KG (ref 275–295)
PLATELET # BLD AUTO: 314 10(3)UL (ref 150–450)
POTASSIUM SERPL-SCNC: 4.9 MMOL/L (ref 3.5–5.1)
RBC # BLD AUTO: 3.64 X10(6)UL
SODIUM SERPL-SCNC: 125 MMOL/L (ref 136–145)
WBC # BLD AUTO: 9 X10(3) UL (ref 4–11)

## 2024-09-24 PROCEDURE — 80048 BASIC METABOLIC PNL TOTAL CA: CPT

## 2024-09-24 PROCEDURE — 85025 COMPLETE CBC W/AUTO DIFF WBC: CPT

## 2024-09-24 PROCEDURE — 36415 COLL VENOUS BLD VENIPUNCTURE: CPT

## 2024-09-25 ENCOUNTER — TELEPHONE (OUTPATIENT)
Dept: FAMILY MEDICINE CLINIC | Facility: CLINIC | Age: 74
End: 2024-09-25

## 2024-09-25 NOTE — TELEPHONE ENCOUNTER
Isrrael-Physical Therapist-[facility], requesting a verbal to add 2 more physical therapy visits for patient in the home.

## 2024-09-25 NOTE — TELEPHONE ENCOUNTER
Depends on her volume status, if she has normal total body fluid it can be inappropriate secretion of antidiuretic hormone, post operative. hypothyroidism, she's drinking too much water or beer, use of diuretics, or an adrenal issue.   Small cell lung cancer can cause it too.       MOSTLY it is meds. Her lorazepam, celexa, and SSRI (excitalopram) are likely contributers BUT THE CULPRIT is ROPINIROLE, STOP THIS MEDICATION

## 2024-09-25 NOTE — TELEPHONE ENCOUNTER
Chente said that patient is in the hospital right now with a low Sodium level. Is there a reason her Sodium level would be low?

## 2024-09-26 ENCOUNTER — TELEPHONE (OUTPATIENT)
Dept: FAMILY MEDICINE CLINIC | Facility: CLINIC | Age: 74
End: 2024-09-26

## 2024-09-26 NOTE — TELEPHONE ENCOUNTER
PATIENT IS CURRENTLY @ [facility], WANTS TO TALK TO NURSE ABOUT HER MEDICATION, CALL  BACK

## 2024-09-26 NOTE — TELEPHONE ENCOUNTER
Patient said that patient received a neb treatment in the hospital. He said that they are giving her a pediatric dose of the Albuterol via the nebulizer. He said within 15 minutes she could talk and she stopped drooling. He said that they also stopped the Tramadol and the Lexapro. She is going to stay another night in the hospital. Dr Burnett advised.

## 2024-09-28 ENCOUNTER — TELEPHONE (OUTPATIENT)
Dept: FAMILY MEDICINE CLINIC | Facility: CLINIC | Age: 74
End: 2024-09-28

## 2024-09-28 NOTE — TELEPHONE ENCOUNTER
Spoke with .  Patient is currently hospitalized at [facility].  Patient is going to stay at the hospital for rehab.  FYI to Dr. Burnett.

## 2024-09-28 NOTE — TELEPHONE ENCOUNTER
Patient  called and patient is currently hospitalized at [facility] since 9/25/24.  would like to speak with nurse or Dr. Burnett.

## 2024-10-01 ENCOUNTER — TELEPHONE (OUTPATIENT)
Dept: FAMILY MEDICINE CLINIC | Facility: CLINIC | Age: 74
End: 2024-10-01

## 2024-10-01 NOTE — TELEPHONE ENCOUNTER
Daughter states that patient is in rehab right now after being in the hospital. Daughter advised that [facility]ie states that Medicare will not pay for a wheelchair because she got a walker in 2023. They will pay for one or the other within 5 years. Daughter advised that she should ask the rehab facility about a wheelchair.

## 2024-10-04 ENCOUNTER — TELEPHONE (OUTPATIENT)
Dept: FAMILY MEDICINE CLINIC | Facility: CLINIC | Age: 74
End: 2024-10-04

## 2024-10-04 NOTE — TELEPHONE ENCOUNTER
Patient has ultrasound scheduled through [facility] and part is done already through [facility], patient  is wanting to know if the other part can be done through [facility] as well.

## 2024-10-05 NOTE — TELEPHONE ENCOUNTER
said Dr Paulino ordered the ultrasound. Advised he will need to have their office send the order to [facility].

## 2024-10-07 ENCOUNTER — TELEPHONE (OUTPATIENT)
Dept: FAMILY MEDICINE CLINIC | Facility: CLINIC | Age: 74
End: 2024-10-07

## 2024-10-07 NOTE — TELEPHONE ENCOUNTER
Patient's  asked how he gets a CD of an MRI done at [facility] in 2023. Advised they need to contact [facility].

## 2024-10-16 ENCOUNTER — TELEPHONE (OUTPATIENT)
Dept: FAMILY MEDICINE CLINIC | Facility: CLINIC | Age: 74
End: 2024-10-16

## 2024-10-16 NOTE — TELEPHONE ENCOUNTER
Patient blood pressure is 118/51 taken about 10:30am, she has not taken her blood pressure medication yet and is wanting to know if she should take it.

## 2024-10-30 ENCOUNTER — LAB ENCOUNTER (OUTPATIENT)
Dept: LAB | Age: 74
End: 2024-10-30
Attending: INTERNAL MEDICINE
Payer: MEDICARE

## 2024-10-30 ENCOUNTER — OFFICE VISIT (OUTPATIENT)
Dept: FAMILY MEDICINE CLINIC | Facility: CLINIC | Age: 74
End: 2024-10-30
Payer: MEDICARE

## 2024-10-30 VITALS
WEIGHT: 127 LBS | DIASTOLIC BLOOD PRESSURE: 68 MMHG | BODY MASS INDEX: 23 KG/M2 | HEART RATE: 69 BPM | OXYGEN SATURATION: 96 % | TEMPERATURE: 98 F | SYSTOLIC BLOOD PRESSURE: 134 MMHG | RESPIRATION RATE: 16 BRPM

## 2024-10-30 DIAGNOSIS — R73.02 IMPAIRED GLUCOSE TOLERANCE (ORAL): ICD-10-CM

## 2024-10-30 DIAGNOSIS — Z00.00 ENCOUNTER FOR ANNUAL HEALTH EXAMINATION: ICD-10-CM

## 2024-10-30 DIAGNOSIS — R35.0 URINARY FREQUENCY: ICD-10-CM

## 2024-10-30 DIAGNOSIS — E74.39 GLUCOSE INTOLERANCE: ICD-10-CM

## 2024-10-30 DIAGNOSIS — I10 HYPERTENSION, UNSPECIFIED TYPE: ICD-10-CM

## 2024-10-30 DIAGNOSIS — E55.9 VITAMIN D DEFICIENCY: ICD-10-CM

## 2024-10-30 DIAGNOSIS — G31.9 NEURODEGENERATIVE DISORDER (HCC): ICD-10-CM

## 2024-10-30 DIAGNOSIS — E78.5 DYSLIPIDEMIA: ICD-10-CM

## 2024-10-30 DIAGNOSIS — Z12.31 VISIT FOR SCREENING MAMMOGRAM: ICD-10-CM

## 2024-10-30 DIAGNOSIS — R10.13 EPIGASTRIC PAIN: Primary | ICD-10-CM

## 2024-10-30 DIAGNOSIS — F31.2 BIPOLAR DISORDER, CURRENT EPISODE MANIC SEVERE WITH PSYCHOTIC FEATURES (HCC): ICD-10-CM

## 2024-10-30 LAB
ALBUMIN SERPL-MCNC: 4.3 G/DL (ref 3.2–4.8)
ALBUMIN/GLOB SERPL: 1.5 {RATIO} (ref 1–2)
ALP LIVER SERPL-CCNC: 81 U/L
ALT SERPL-CCNC: 8 U/L
ANION GAP SERPL CALC-SCNC: 7 MMOL/L (ref 0–18)
APPEARANCE: CLEAR
AST SERPL-CCNC: 19 U/L (ref ?–34)
BASOPHILS # BLD AUTO: 0.03 X10(3) UL (ref 0–0.2)
BASOPHILS NFR BLD AUTO: 0.4 %
BILIRUB SERPL-MCNC: 0.3 MG/DL (ref 0.2–1.1)
BILIRUBIN: NEGATIVE
BUN BLD-MCNC: 35 MG/DL (ref 9–23)
CALCIUM BLD-MCNC: 9.9 MG/DL (ref 8.7–10.4)
CHLORIDE SERPL-SCNC: 99 MMOL/L (ref 98–112)
CHOLEST SERPL-MCNC: 123 MG/DL (ref ?–200)
CO2 SERPL-SCNC: 28 MMOL/L (ref 21–32)
CREAT BLD-MCNC: 0.64 MG/DL
EGFRCR SERPLBLD CKD-EPI 2021: 92 ML/MIN/1.73M2 (ref 60–?)
EOSINOPHIL # BLD AUTO: 0.07 X10(3) UL (ref 0–0.7)
EOSINOPHIL NFR BLD AUTO: 0.9 %
ERYTHROCYTE [DISTWIDTH] IN BLOOD BY AUTOMATED COUNT: 13 %
EST. AVERAGE GLUCOSE BLD GHB EST-MCNC: 97 MG/DL (ref 68–126)
GLOBULIN PLAS-MCNC: 2.8 G/DL (ref 2–3.5)
GLUCOSE (URINE DIPSTICK): NEGATIVE MG/DL
GLUCOSE BLD-MCNC: 103 MG/DL (ref 70–99)
HBA1C MFR BLD: 5 % (ref ?–5.7)
HCT VFR BLD AUTO: 33.8 %
HDLC SERPL-MCNC: 45 MG/DL (ref 40–59)
HGB BLD-MCNC: 10.9 G/DL
IMM GRANULOCYTES # BLD AUTO: 0.02 X10(3) UL (ref 0–1)
IMM GRANULOCYTES NFR BLD: 0.3 %
LDLC SERPL CALC-MCNC: 63 MG/DL (ref ?–100)
LEUKOCYTES: NEGATIVE
LYMPHOCYTES # BLD AUTO: 1.61 X10(3) UL (ref 1–4)
LYMPHOCYTES NFR BLD AUTO: 20.6 %
MCH RBC QN AUTO: 28.9 PG (ref 26–34)
MCHC RBC AUTO-ENTMCNC: 32.2 G/DL (ref 31–37)
MCV RBC AUTO: 89.7 FL
MONOCYTES # BLD AUTO: 0.58 X10(3) UL (ref 0.1–1)
MONOCYTES NFR BLD AUTO: 7.4 %
MULTISTIX LOT#: NORMAL NUMERIC
NEUTROPHILS # BLD AUTO: 5.49 X10 (3) UL (ref 1.5–7.7)
NEUTROPHILS # BLD AUTO: 5.49 X10(3) UL (ref 1.5–7.7)
NEUTROPHILS NFR BLD AUTO: 70.4 %
NITRITE, URINE: NEGATIVE
NONHDLC SERPL-MCNC: 78 MG/DL (ref ?–130)
OCCULT BLOOD: NEGATIVE
OSMOLALITY SERPL CALC.SUM OF ELEC: 286 MOSM/KG (ref 275–295)
PH, URINE: 6 (ref 4.5–8)
PLATELET # BLD AUTO: 241 10(3)UL (ref 150–450)
POTASSIUM SERPL-SCNC: 4.6 MMOL/L (ref 3.5–5.1)
PROT SERPL-MCNC: 7.1 G/DL (ref 5.7–8.2)
PROTEIN (URINE DIPSTICK): NEGATIVE MG/DL
RBC # BLD AUTO: 3.77 X10(6)UL
SODIUM SERPL-SCNC: 134 MMOL/L (ref 136–145)
SPECIFIC GRAVITY: 1.02 (ref 1–1.03)
TRIGL SERPL-MCNC: 75 MG/DL (ref 30–149)
URINE-COLOR: YELLOW
UROBILINOGEN,SEMI-QN: 0.2 MG/DL (ref 0–1.9)
VIT D+METAB SERPL-MCNC: 62.9 NG/ML (ref 30–100)
VLDLC SERPL CALC-MCNC: 11 MG/DL (ref 0–30)
WBC # BLD AUTO: 7.8 X10(3) UL (ref 4–11)

## 2024-10-30 PROCEDURE — 80061 LIPID PANEL: CPT

## 2024-10-30 PROCEDURE — 82306 VITAMIN D 25 HYDROXY: CPT

## 2024-10-30 PROCEDURE — 83036 HEMOGLOBIN GLYCOSYLATED A1C: CPT

## 2024-10-30 PROCEDURE — 87086 URINE CULTURE/COLONY COUNT: CPT | Performed by: INTERNAL MEDICINE

## 2024-10-30 PROCEDURE — 85025 COMPLETE CBC W/AUTO DIFF WBC: CPT

## 2024-10-30 PROCEDURE — 87077 CULTURE AEROBIC IDENTIFY: CPT | Performed by: INTERNAL MEDICINE

## 2024-10-30 PROCEDURE — 36415 COLL VENOUS BLD VENIPUNCTURE: CPT

## 2024-10-30 PROCEDURE — 80053 COMPREHEN METABOLIC PANEL: CPT

## 2024-10-30 RX ORDER — OMEPRAZOLE 40 MG/1
40 CAPSULE, DELAYED RELEASE ORAL DAILY
Qty: 90 CAPSULE | Refills: 3 | Status: SHIPPED | OUTPATIENT
Start: 2024-10-30 | End: 2025-10-25

## 2024-10-30 NOTE — PROGRESS NOTES
Subjective:   Leann Chatman is a 74 year old female who presents for a Medicare Wellness Visit charge within the last 11 months and Patient may not meet criteria for AWV: Please evaluate for correct coding and scheduled follow up of multiple significant but stable problems.   Pt had a hip replacement left and recovering very well.  She is here with her caregiver today, he has had no falls and in great spirits. She c/o some urinary frequency at night.      History/Other:   Fall Risk Assessment:   She has been screened for Falls and is High Risk. Fall Prevention information provided to patient in After Visit Summary.    Do you feel unsteady when standing or walking?: Yes  Do you worry about falling?: Yes  Have you fallen in the past year?: Yes  How many times have you fallen?: 2  Were you injured?: No     Cognitive Assessment:   She had a completely normal cognitive assessment - see flowsheet entries     Functional Ability/Status:   Leann Chatman has some abnormal functions as listed below:  She has Dressing and/or Bathing issues based on screening of functional status.  Difficulty dressing or bathing?: Yes  Bathing or Showering: Able without help  Dressing: Able without help  She has Driving difficulties based on screening of functional status. She has Meal Preparation difficulties based on screening of functional status.She has difficulties Managing Money/Bills based on screening of functional status.She has difficulties Shopping for Groceries based on screening of functional status. She has difficulties Taking Meds as Rx'd based on screening of functional status. She has Walking problems based on screening of functional status. She has problems with Daily Activities based on screening of functional status.       Depression Screening (PHQ):  PHQ-2 SCORE: 0  , done 10/30/2024   [facility] Suicide Screening on 10/30/2024 was No Risk.     <5 minutes spent screening and counseling for depression    Advanced  Directives:   She does have a Living Will but we do NOT have it on file in Epic.    She has a Power of  for Health Care on file in [facility].  Discussed Advance Care Planning with patient (and family/surrogate if present). Standard forms made available to patient in After Visit Summary.      Patient Active Problem List   Diagnosis    Benign essential HTN    Dyslipidemia    Impaired fasting glucose    Carotid stenosis, asymptomatic, bilateral    Difficulty walking    Generalized edema    Age-related nuclear cataract of both eyes    Primary osteoarthritis of left hip    Pain of left femur    Palpitations    Dyskinesia, tardive    Neurodegenerative disorder (HCC)    Pneumonia due to COVID-19 virus    Anxiety    Pre-op testing    S/P total hip arthroplasty    Bipolar disorder, current episode manic severe with psychotic features ([facility])     Allergies:  She is allergic to ace inhibitors, diazepam, keflex, lexapro, cozaar [losartan], levaquin, zyrtec [cetirizine hcl], and mastisol adhesive.    Current Medications:  Outpatient Medications Marked as Taking for the 10/30/24 encounter (Office Visit) with Anne Burnett MD   Medication Sig    Omeprazole 40 MG Oral Capsule Delayed Release Take 1 capsule (40 mg total) by mouth daily.    LORazepam 0.5 MG Oral Tab Take 1 tablet (0.5 mg total) by mouth in the morning, at noon, and at bedtime.    Naloxone HCl 4 MG/0.1ML Nasal Liquid 4 mg by Nasal route as needed. If patient remains unresponsive, repeat dose in other nostril 2-5 minutes after first dose.    celecoxib 200 MG Oral Cap Take 1 capsule (200 mg total) by mouth daily.    sennosides-docusate 8.6-50 MG Oral Tab Take 1 tablet by mouth daily.    ASPIRIN LOW DOSE 81 MG Oral Tab EC TAKE 1 TABLET BY MOUTH TWICE DAILY FOR SIX WEEKS TOTAL FOR BLOOD CLOT PREVENTION    escitalopram 20 MG Oral Tab Take 1 tablet (20 mg total) by mouth daily.    Lactobacillus (PROBIOTIC ACIDOPHILUS OR) Take 1 capsule by mouth daily.    Omega-3 Fatty  Acids (FISH OIL CONCENTRATE OR) Take 1 capsule by mouth daily.    Calcium Carb-Cholecalciferol (CALTRATE BONE HEALTH OR) Take 1 tablet by mouth daily.    Multiple Vitamins-Minerals (CENTRUM SILVER OR) Take 1 tablet by mouth daily.    simvastatin 20 MG Oral Tab Take 1 tablet (20 mg total) by mouth nightly.    clopidogrel 75 MG Oral Tab Take 1 tablet (75 mg total) by mouth daily.    clobetasol 0.05 % External Cream Apply 1 Application topically daily as needed.    fluticasone propionate 50 MCG/ACT Nasal Suspension 2 sprays by Each Nare route daily as needed.    acetaminophen 325 MG Oral Tab Take 2 tablets (650 mg total) by mouth every 4 (four) hours as needed for Pain.    Valbenazine Tosylate (INGREZZA) 40 MG Oral Cap Take 1 tablet by mouth daily.    Cyanocobalamin (B-12) 500 MCG Oral Tab Take 1,000 Units by mouth daily.         Medical History:  She  has a past medical history of Anxiety state, Back problem, Cataract, Closed fracture of transverse process of lumbar vertebra with routine healing (09/05/2017), Contact dermatitis and other eczema, due to unspecified cause (02/08/2000), Coronary atherosclerosis, Depression, Difficult intubation, Diverticulosis, GERD (gastroesophageal reflux disease), Hearing impairment, Hemorrhoids, Herpes zoster (06/08/2015), High blood pressure, High cholesterol, History of lumbar discectomy (09/25/2014), History of right-sided carotid endarterectomy (07/27/2020), HTN (hypertension), Hyperlipidemia, Impaired fasting glucose, Irritable bowel, OAB (overactive bladder) (04/08/2015), Osteoarthritis, Osteoarthritis of left hip (09/04/2014), Osteoarthritis of lumbar spine, Psoriasis, Subclavian arterial stenosis (HCC) (01/12/2021), Tardive dyskinesia, Visual impairment, and Vitamin D deficiency (09/20/2012).  Surgical History:  She  has a past surgical history that includes colonoscopy; mastectomy left; mastectomy right; back surgery (2007); hysterectomy (1989); back surgery (09/18/2017);  inner ear surgery proc unlisted (Right, ); other surgical history (2021); and other surgical history (Bilateral).   Family History:  Her family history includes Breast Cancer in her mother; Diabetes in her maternal aunt and maternal uncle; Hypertension in her brother, father, and mother; Other in her brother, father, and mother; Ovarian Cancer in her paternal aunt.  Social History:  She  reports that she quit smoking about 38 years ago. Her smoking use included cigarettes. She has never used smokeless tobacco. She reports current alcohol use of about 1.0 standard drink of alcohol per week. She reports that she does not use drugs.    Tobacco:  She smoked tobacco in the past but quit greater than 12 months ago.  Social History     Tobacco Use   Smoking Status Former    Current packs/day: 0.00    Types: Cigarettes    Quit date: 1986    Years since quittin.6   Smokeless Tobacco Never          CAGE Alcohol Screen:   CAGE screening score of 0 on 10/30/2024, showing low risk of alcohol abuse.      Patient Care Team:  Anne Burnett MD as PCP - General (Family Medicine)  Jimenez Esquivel, PT as Physical Therapist  Paul Mckinney Asra, SLP (Speech Therapist)  Maurisio Cardoza MD as Consulting Physician (NEUROLOGY)  Keegan Castrejon MD as Consulting Physician (NEUROLOGY)  Eveyln Solomon, PT as Physical Therapist (Physical Therapy)  Regan Paulino MD (Cardiovascular Diseases)    Review of Systems  GENERAL: feels well otherwise  SKIN: denies any unusual skin lesions  EYES: denies blurred vision or double vision  HEENT: denies nasal congestion, sinus pain or ST  LUNGS: denies shortness of breath with exertion  CARDIOVASCULAR: denies chest pain on exertion  GI: denies abdominal pain, denies heartburn  : denies dysuria, vaginal discharge or itching, no complaint of urinary incontinence   MUSCULOSKELETAL: denies back pain  NEURO: denies headaches  PSYCHE: denies depression or anxiety  HEMATOLOGIC:  denies hx of anemia  ENDOCRINE: denies thyroid history  ALL/ASTHMA: denies hx of allergy or asthma    Objective:   Physical Exam  General Appearance:  Alert, cooperative, no distress, appears stated age   Head:  Normocephalic, without obvious abnormality, atraumatic   Eyes:  PERRL, conjunctiva/corneas clear, EOM's intact both eyes   Ears:  Normal TM's and external ear canals, both ears   Nose: Nares normal, septum midline,mucosa normal, no drainage or sinus tenderness   Throat: Lips, mucosa, and tongue normal; teeth and gums normal   Neck: Supple, symmetrical, trachea midline, no adenopathy;  thyroid: not enlarged, symmetric, no tenderness/mass/nodules; no carotid bruit or JVD   Back:   Symmetric, no curvature, ROM normal, no CVA tenderness   Lungs:   Clear to auscultation bilaterally, respirations unlabored   Heart:  Regular rate and rhythm, S1 and S2 normal, no murmur, rub, or gallop   Abdomen:   Soft, non-tender, bowel sounds active all four quadrants,  no masses, no organomegaly   Pelvic: Deferred   Extremities: Extremities normal, atraumatic, no cyanosis or edema   Pulses: 2+ and symmetric   Skin: Skin color, texture, turgor normal, no rashes or lesions   Lymph nodes: Cervical, supraclavicular, and axillary nodes normal   Neurologic: Normal       /68   Pulse 69   Temp 98.3 °F (36.8 °C) (Temporal)   Resp 16   Wt 127 lb (57.6 kg)   SpO2 96%   BMI 23.23 kg/m²  Estimated body mass index is 23.23 kg/m² as calculated from the following:    Height as of 8/19/24: 5' 2\" (1.575 m).    Weight as of this encounter: 127 lb (57.6 kg).    Medicare Hearing Assessment:   Hearing Screening    Time taken: 11/1/2024 10:46 AM  Entry User: Anne Burnett MD  Screening Method: Whisper Test  Whisper Test Result: Fail         Visual Acuity:   Right Eye Visual Acuity: Corrected Right Eye Chart Acuity: 20/40   Left Eye Visual Acuity: Corrected Left Eye Chart Acuity: 20/40   Both Eyes Visual Acuity: Corrected Both Eyes Chart  Acuity: 20/40   Able To Tolerate Visual Acuity: Yes        Assessment & Plan:   Leann Chatman is a 74 year old female who presents for a Medicare Assessment.     1. Epigastric pain (Primary)  -     Omeprazole; Take 1 capsule (40 mg total) by mouth daily.  Dispense: 90 capsule; Refill: 3  2. Urinary frequency  -     Urine Dip, auto without Micro  -     Urine Culture, Routine; Future; Expected date: 10/30/2024  -     Urine Culture, Routine  3. Glucose intolerance  -     Hemoglobin A1C; Future; Expected date: 10/30/2024  4. Hypertension, unspecified type  -     Comp Metabolic Panel (14); Future; Expected date: 10/30/2024  -     CBC With Differential With Platelet; Future; Expected date: 10/30/2024  -     Lipid Panel; Future; Expected date: 10/30/2024  5. Vitamin D deficiency  -     Vitamin D; Future; Expected date: 10/30/2024  6. Impaired glucose tolerance (oral)  -     Hemoglobin A1C; Future; Expected date: 10/30/2024  7. Visit for screening mammogram  -     3D Mammogram Digital Screen, Bilateral (CPT=77067/61009); Future; Expected date: 10/30/2024  8. Encounter for annual health examination  9. Neurodegenerative disorder (HCC)  10. Dyslipidemia  11. Bipolar disorder, current episode manic severe with psychotic features (HCC)  1. Epigastric pain  Continue PPI  - Omeprazole 40 MG Oral Capsule Delayed Release; Take 1 capsule (40 mg total) by mouth daily.  Dispense: 90 capsule; Refill: 3    2. Urinary frequency  Strep in urine treat with PNC  - Urine Dip, auto without Micro  - Urine Culture, Routine [E]; Future  - Urine Culture, Routine [E]    3. Glucose intolerance  No DM  - Hemoglobin A1C [E]; Future    4. Hypertension, unspecified type  Well controlled, CCM  - Comp Metabolic Panel (14) [E]; Future  - CBC W Differential W Platelet [E]; Future  - Lipid Panel [E]; Future    5. Vitamin D deficiency  Recheck labs  - Vitamin D [E]; Future    6. Impaired glucose tolerance (oral)  See above  - Hemoglobin A1C [E];  Future    7. Visit for screening mammogram  ordered  - 3D Mammogram Digital Screen, Bilateral (CPT=77067/08563); Future    8. Encounter for annual health examination  done    9. Neurodegenerative disorder (HCC)  NOS, affects swallow and speach    10. Dyslipidemia  Well controlled, CCM    11. Bipolar disorder, current episode manic severe with psychotic features (HCC)  Well controlled, pysch prescribing meds.     The patient indicates understanding of these issues and agrees to the plan.  Reinforced healthy diet, lifestyle, and exercise.      No follow-ups on file.     Anne Burnett MD, 10/30/2024     Supplementary Documentation:   General Health:  In the past six months, have you lost more than 10 pounds without trying?: 2 - No  Has your appetite been poor?: No  Type of Diet: Balanced  How does the patient maintain a good energy level?: Other  How would you describe your daily physical activity?: Light  How would you describe your current health state?: Fair  How do you maintain positive mental well-being?: Visiting Family  On a scale of 0 to 10, with 0 being no pain and 10 being severe pain, what is your pain level?: 1 - (Mild)  In the past six months, have you experienced urine leakage?: 1-Yes  At any time do you feel concerned for the safety/well-being of yourself and/or your children, in your home or elsewhere?: No  Have you had any immunizations at another office such as Influenza, Hepatitis B, Tetanus, or Pneumococcal?: No    Health Maintenance   Topic Date Due    Mammogram  08/06/2024    COVID-19 Vaccine (4 - 2023-24 season) 09/01/2024    Annual Physical  09/21/2024    Influenza Vaccine  Completed    DEXA Scan  Completed    Annual Depression Screening  Completed    Fall Risk Screening (Annual)  Completed    Pneumococcal Vaccine: 65+ Years  Completed    Zoster Vaccines  Completed    Colorectal Cancer Screening  Discontinued

## 2024-10-30 NOTE — PROGRESS NOTES
Leann Chatman is a 74 year old female.  HPI:   Hospital follow up after left hip replacement,   Current Outpatient Medications   Medication Sig Dispense Refill    OMEPRAZOLE 40 MG Oral Capsule Delayed Release TAKE 1 CAPSULE(40 MG) BY MOUTH DAILY 90 capsule 0    LORazepam 0.5 MG Oral Tab Take 1 tablet (0.5 mg total) by mouth in the morning, at noon, and at bedtime. 10 tablet 0    oxyCODONE 5 MG Oral Tab Take 0.5-1 tablets (2.5-5 mg total) by mouth every 4 (four) hours as needed for Pain. 10 tablet 0    Naloxone HCl 4 MG/0.1ML Nasal Liquid 4 mg by Nasal route as needed. If patient remains unresponsive, repeat dose in other nostril 2-5 minutes after first dose. 1 kit 0    celecoxib 200 MG Oral Cap Take 1 capsule (200 mg total) by mouth daily.      sennosides-docusate 8.6-50 MG Oral Tab Take 1 tablet by mouth daily.      ASPIRIN LOW DOSE 81 MG Oral Tab EC TAKE 1 TABLET BY MOUTH TWICE DAILY FOR SIX WEEKS TOTAL FOR BLOOD CLOT PREVENTION      escitalopram 20 MG Oral Tab Take 1 tablet (20 mg total) by mouth daily.      Lactobacillus (PROBIOTIC ACIDOPHILUS OR) Take 1 capsule by mouth daily.      Omega-3 Fatty Acids (FISH OIL CONCENTRATE OR) Take 1 capsule by mouth daily.      Calcium Carb-Cholecalciferol (CALTRATE BONE HEALTH OR) Take 1 tablet by mouth daily.      Multiple Vitamins-Minerals (CENTRUM SILVER OR) Take 1 tablet by mouth daily.      simvastatin 20 MG Oral Tab Take 1 tablet (20 mg total) by mouth nightly. 90 tablet 3    clopidogrel 75 MG Oral Tab Take 1 tablet (75 mg total) by mouth daily. 90 tablet 3    clobetasol 0.05 % External Cream Apply 1 Application topically daily as needed.      fluticasone propionate 50 MCG/ACT Nasal Suspension 2 sprays by Each Nare route daily as needed.      acetaminophen 325 MG Oral Tab Take 2 tablets (650 mg total) by mouth every 4 (four) hours as needed for Pain.      Valbenazine Tosylate (INGREZZA) 40 MG Oral Cap Take 1 tablet by mouth daily.      Cyanocobalamin (B-12) 500 MCG  Oral Tab Take 1,000 Units by mouth daily.          Past Medical History:    Anxiety state    Back problem    Spinal Stenosis    Cataract    starting; No Lenses    Closed fracture of transverse process of lumbar vertebra with routine healing    Contact dermatitis and other eczema, due to unspecified cause    Coronary atherosclerosis    Subclavian Stent    Depression    Difficult intubation    pt says she has been told to say she has a small mouth/throat    Diverticulosis    GERD (gastroesophageal reflux disease)    EGD 2011    Hearing impairment    otolodosclerosis both ears; implant right ear    Hemorrhoids    Herpes zoster    High blood pressure    High cholesterol    History of lumbar discectomy    History of right-sided carotid endarterectomy    HTN (hypertension)    Hyperlipidemia    Impaired fasting glucose    Irritable bowel    OAB (overactive bladder)    Osteoarthritis    Osteoarthritis of left hip    Osteoarthritis of lumbar spine    Psoriasis    Subclavian arterial stenosis (HCC)    Tardive dyskinesia    Visual impairment    Glasses    Vitamin D deficiency      Social History:  Social History     Socioeconomic History    Marital status:     Number of children: 2   Occupational History    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   Tobacco Use    Smoking status: Former     Current packs/day: 0.00     Types: Cigarettes     Quit date: 1986     Years since quittin.5    Smokeless tobacco: Never   Vaping Use    Vaping status: Never Used   Substance and Sexual Activity    Alcohol use: Yes     Alcohol/week: 1.0 standard drink of alcohol     Types: 1 Cans of beer per week     Comment: beer sometimes    Drug use: No    Sexual activity: Yes     Partners: Male   Other Topics Concern    Caffeine Concern No     Comment: 2 cups coffee in AM    Exercise No     Comment: 2x per week     Social Drivers of Health     Food Insecurity: Low Risk  (10/3/2024)    Received from [facility]    Food Insecurity      Have there been times that your food ran out, and you didn't have money to get more?: No     Are there times that you worry that this might happen?: No   Transportation Needs: Low Risk  (10/3/2024)    Received from [facility]    Transportation Needs     Do you have trouble getting transportation to medical appointments?: No     How do you normally get to and from your appointments?: Family/Friend   Physical Activity: Inactive (1/28/2021)    [facility]    Exercise Vital Sign     Days of Exercise per Week: 0 days     Minutes of Exercise per Session: 0 min   Housing Stability: Low Risk  (10/3/2024)    Received from [facility]    Housing Stability     Are you worried that your electric, gas, oil, or water might be shut off?: No     Are you concerned about having a safe and reliable place to live?: No        REVIEW OF SYSTEMS:   GENERAL HEALTH: feels well otherwise  SKIN: denies any unusual skin lesions or rashes  RESPIRATORY: denies shortness of breath with exertion  CARDIOVASCULAR: denies chest pain on exertion  GI: denies abdominal pain and denies heartburn  NEURO: denies headaches    EXAM:   /68   Pulse 69   Temp 98.3 °F (36.8 °C) (Temporal)   Resp 16   Wt 127 lb (57.6 kg)   SpO2 96%   BMI 23.23 kg/m²   GENERAL: well developed, well nourished,in no apparent distress  SKIN: no rashes,no suspicious lesions  HEENT: atraumatic, normocephalic,ears and throat are clear  NECK: supple,no adenopathy,no bruits  LUNGS: clear to auscultation  CARDIO: RRR without murmur  GI: good BS's,no masses, HSM or tenderness  EXTREMITIES: no cyanosis, clubbing or edema    ASSESSMENT AND PLAN:     Encounter Diagnosis   Name Primary?    Epigastric pain Yes       No orders of the defined types were placed in this encounter.      Meds & Refills for this Visit:  Requested Prescriptions     Signed Prescriptions Disp Refills    Omeprazole  40 MG Oral Capsule Delayed Release 90 capsule 3     Sig: Take 1 capsule (40 mg total) by mouth daily.       Imaging & Consults:  None    Follow up as needed.    The patient indicates understanding of these issues and agrees to the plan.

## 2024-10-31 ENCOUNTER — TELEPHONE (OUTPATIENT)
Dept: FAMILY MEDICINE CLINIC | Facility: CLINIC | Age: 74
End: 2024-10-31

## 2024-10-31 NOTE — TELEPHONE ENCOUNTER
Did she take 10 amlodipine and 25 metoprolol yesterday? This is a reflection of these medications.  Does she take her blood pressure everyday? How has that been?    If it has been consistently low and she has been taking it then reduce norvasc to 5 mg and continue metoprolol 25 mg

## 2024-10-31 NOTE — TELEPHONE ENCOUNTER
Melissa states patient has not taken either of her blood pressure medications today (Amlodipne and Metoprolol). Can Dr Burnett provide parameters for the family when to hold the medication.

## 2024-11-01 ENCOUNTER — TELEPHONE (OUTPATIENT)
Dept: FAMILY MEDICINE CLINIC | Facility: CLINIC | Age: 74
End: 2024-11-01

## 2024-11-01 PROBLEM — F31.2 BIPOLAR DISORDER, CURRENT EPISODE MANIC SEVERE WITH PSYCHOTIC FEATURES (HCC): Status: ACTIVE | Noted: 2024-11-01

## 2024-11-01 RX ORDER — AMOXICILLIN AND CLAVULANATE POTASSIUM 500; 125 MG/1; MG/1
1 TABLET, FILM COATED ORAL 2 TIMES DAILY
Qty: 14 TABLET | Refills: 0 | Status: SHIPPED | OUTPATIENT
Start: 2024-11-01

## 2024-11-01 NOTE — TELEPHONE ENCOUNTER
----- Message from Anne Burnett sent at 11/1/2024 10:29 AM CDT -----  You do need an antibiotic for the urine, can you take Augmentin 500 mg BID x 7 days--if yes, please sent (cephalosporin allergy on list)

## 2024-11-01 NOTE — TELEPHONE ENCOUNTER
Patient advised.  States has taken Augmentin in the past with no issues.  Discussed with Dr. Beal.  Medication sent to pharmacy.   GULSHAN

## 2024-11-02 NOTE — PROGRESS NOTES
Subjective:   Leann Chatman is a 74 year old female who presents for a Medicare Wellness Visit charge within the last 11 months and Patient may not meet criteria for AWV: Please evaluate for correct coding and scheduled follow up of multiple significant but stable problems.   Pt had a left hip replacement and recovering now at home. Pt is hewre with her caregiver and seems to be doing well.     History/Other:   Fall Risk Assessment:   She has been screened for Falls and is High Risk. Fall Prevention information provided to patient in After Visit Summary.    Do you feel unsteady when standing or walking?: Yes  Do you worry about falling?: Yes  Have you fallen in the past year?: Yes  How many times have you fallen?: 2  Were you injured?: No     Cognitive Assessment:   She had a completely normal cognitive assessment - see flowsheet entries     Functional Ability/Status:   Leann Chatman has some abnormal functions as listed below:  She has Dressing and/or Bathing issues based on screening of functional status.  Difficulty dressing or bathing?: Yes  Bathing or Showering: Able without help  Dressing: Able without help  She has Driving difficulties based on screening of functional status. She has Meal Preparation difficulties based on screening of functional status.She has difficulties Managing Money/Bills based on screening of functional status.She has difficulties Shopping for Groceries based on screening of functional status. She has difficulties Taking Meds as Rx'd based on screening of functional status. She has Walking problems based on screening of functional status. She has problems with Daily Activities based on screening of functional status.       Depression Screening (PHQ):  PHQ-2 SCORE: 0  , done 10/30/2024   Last [place] Suicide Screening on 10/30/2024 was No Risk.     <5 minutes spent screening and counseling for depression    Advanced Directives:   She does have a Living Will but we do NOT have it on  file in Epic.    She has a Power of  for Health Care on file in [employer].  Discussed Advance Care Planning with patient (and family/surrogate if present). Standard forms made available to patient in After Visit Summary.      Patient Active Problem List   Diagnosis    Benign essential HTN    Dyslipidemia    Impaired fasting glucose    Carotid stenosis, asymptomatic, bilateral    Difficulty walking    Generalized edema    Age-related nuclear cataract of both eyes    Primary osteoarthritis of left hip    Pain of left femur    Palpitations    Dyskinesia, tardive    Neurodegenerative disorder (HCC)    Pneumonia due to COVID-19 virus    Anxiety    Pre-op testing    S/P total hip arthroplasty    Bipolar disorder, current episode manic severe with psychotic features (HCC)     Allergies:  She is allergic to ace inhibitors, diazepam, keflex, lexapro, cozaar [losartan], levaquin, zyrtec [cetirizine hcl], and mastisol adhesive.    Current Medications:  Outpatient Medications Marked as Taking for the 10/30/24 encounter (Office Visit) with Anne Burnett MD   Medication Sig    Omeprazole 40 MG Oral Capsule Delayed Release Take 1 capsule (40 mg total) by mouth daily.    LORazepam 0.5 MG Oral Tab Take 1 tablet (0.5 mg total) by mouth in the morning, at noon, and at bedtime.    Naloxone HCl 4 MG/0.1ML Nasal Liquid 4 mg by Nasal route as needed. If patient remains unresponsive, repeat dose in other nostril 2-5 minutes after first dose.    celecoxib 200 MG Oral Cap Take 1 capsule (200 mg total) by mouth daily.    sennosides-docusate 8.6-50 MG Oral Tab Take 1 tablet by mouth daily.    ASPIRIN LOW DOSE 81 MG Oral Tab EC TAKE 1 TABLET BY MOUTH TWICE DAILY FOR SIX WEEKS TOTAL FOR BLOOD CLOT PREVENTION    escitalopram 20 MG Oral Tab Take 1 tablet (20 mg total) by mouth daily.    Lactobacillus (PROBIOTIC ACIDOPHILUS OR) Take 1 capsule by mouth daily.    Omega-3 Fatty Acids (FISH OIL CONCENTRATE OR) Take 1 capsule by mouth daily.     Calcium Carb-Cholecalciferol (CALTRATE BONE [facility] [employer] OR) Take 1 tablet by mouth daily.    Multiple Vitamins-Minerals (CENTRUM SILVER OR) Take 1 tablet by mouth daily.    simvastatin 20 MG Oral Tab Take 1 tablet (20 mg total) by mouth nightly.    clopidogrel 75 MG Oral Tab Take 1 tablet (75 mg total) by mouth daily.    clobetasol 0.05 % External Cream Apply 1 Application topically daily as needed.    fluticasone propionate 50 MCG/ACT Nasal Suspension 2 sprays by Each Nare route daily as needed.    acetaminophen 325 MG Oral Tab Take 2 tablets (650 mg total) by mouth every 4 (four) hours as needed for Pain.    Valbenazine Tosylate (INGREZZA) 40 MG Oral Cap Take 1 tablet by mouth daily.    Cyanocobalamin (B-12) 500 MCG Oral Tab Take 1,000 Units by mouth daily.         Medical History:  She  has a past medical history of Anxiety state, Back problem, Cataract, Closed fracture of transverse process of lumbar vertebra with routine healing (09/05/2017), Contact dermatitis and other eczema, due to unspecified cause (02/08/2000), Coronary atherosclerosis, Depression, Difficult intubation, Diverticulosis, GERD (gastroesophageal reflux disease), Hearing impairment, Hemorrhoids, Herpes zoster (06/08/2015), High blood pressure, High cholesterol, History of lumbar discectomy (09/25/2014), History of right-sided carotid endarterectomy (07/27/2020), HTN (hypertension), Hyperlipidemia, Impaired fasting glucose, Irritable bowel, OAB (overactive bladder) (04/08/2015), Osteoarthritis, Osteoarthritis of left hip (09/04/2014), Osteoarthritis of lumbar spine, Psoriasis, Subclavian arterial stenosis (HCC) (01/12/2021), Tardive dyskinesia, Visual impairment, and Vitamin D deficiency (09/20/2012).  Surgical History:  She  has a past surgical history that includes colonoscopy; mastectomy left; mastectomy right; back surgery (2007); hysterectomy (1989); back surgery (09/18/2017); inner ear surgery proc unlisted (Right, 1992); other surgical  history (2021); and other surgical history (Bilateral).   Family History:  Her family history includes Breast Cancer in her mother; Diabetes in her maternal aunt and maternal uncle; Hypertension in her brother, father, and mother; Other in her brother, father, and mother; Ovarian Cancer in her paternal aunt.  Social History:  She  reports that she quit smoking about 38 years ago. Her smoking use included cigarettes. She has never used smokeless tobacco. She reports current alcohol use of about 1.0 standard drink of alcohol per week. She reports that she does not use drugs.    Tobacco:  She smoked tobacco in the past but quit greater than 12 months ago.  Social History     Tobacco Use   Smoking Status Former    Current packs/day: 0.00    Types: Cigarettes    Quit date: 1986    Years since quittin.6   Smokeless Tobacco Never          CAGE Alcohol Screen:   CAGE screening score of 0 on 10/30/2024, showing low risk of alcohol abuse.      Patient Care Team:  Anne Burnett MD as PCP - General (Family Medicine)  Jimenez Esquivel, PT as Physical Therapist  Paul Mckinney Asra, SLP (Speech Therapist)  Maurisio Cardoza MD as Consulting Physician (NEUROLOGY)  Keegan Castrejon MD as Consulting Physician (NEUROLOGY)  Evelyn Solomon, PT as Physical Therapist (Physical Therapy)  Regan Paulino MD (Cardiovascular Diseases)    Review of Systems  GENERAL: feels well otherwise  SKIN: denies any unusual skin lesions  EYES: denies blurred vision or double vision  HEENT: denies nasal congestion, sinus pain or ST  LUNGS: denies shortness of breath with exertion  CARDIOVASCULAR: denies chest pain on exertion  GI: denies abdominal pain, denies heartburn  : denies dysuria, vaginal discharge or itching, no complaint of urinary incontinence   MUSCULOSKELETAL: denies back pain  NEURO: denies headaches  PSYCHE: denies depression or anxiety  HEMATOLOGIC: denies hx of anemia  ENDOCRINE: denies thyroid  history  ALL/ASTHMA: denies hx of allergy or asthma    Objective:   Physical Exam  General Appearance:  Alert, cooperative, no distress, appears stated age   Head:  Normocephalic, without obvious abnormality, atraumatic   Eyes:  PERRL, conjunctiva/corneas clear, EOM's intact both eyes   Ears:  Normal TM's and external ear canals, both ears   Nose: Nares normal, septum midline,mucosa normal, no drainage or sinus tenderness   Throat: Lips, mucosa, and tongue normal; teeth and gums normal   Neck: Supple, symmetrical, trachea midline, no adenopathy;  thyroid: not enlarged, symmetric, no tenderness/mass/nodules; no carotid bruit or JVD   Back:   Symmetric, no curvature, ROM normal, no CVA tenderness   Lungs:   Clear to auscultation bilaterally, respirations unlabored   Heart:  Regular rate and rhythm, S1 and S2 normal, no murmur, rub, or gallop   Abdomen:   Soft, non-tender, bowel sounds active all four quadrants,  no masses, no organomegaly   Pelvic: Deferred   Extremities: Extremities normal, atraumatic, no cyanosis or edema   Pulses: 2+ and symmetric   Skin: Skin color, texture, turgor normal, no rashes or lesions   Lymph nodes: Cervical, supraclavicular, and axillary nodes normal   Neurologic: Normal       /68   Pulse 69   Temp 98.3 °F (36.8 °C) (Temporal)   Resp 16   Wt 127 lb (57.6 kg)   SpO2 96%   BMI 23.23 kg/m²  Estimated body mass index is 23.23 kg/m² as calculated from the following:    Height as of 8/19/24: 5' 2\" (1.575 m).    Weight as of this encounter: 127 lb (57.6 kg).    Medicare Hearing Assessment:   Hearing Screening    Time taken: 11/1/2024 10:46 AM  Entry User: Anne Burnett MD  Screening Method: Whisper Test  Whisper Test Result: Fail         Visual Acuity:   Right Eye Visual Acuity: Corrected Right Eye Chart Acuity: 20/40   Left Eye Visual Acuity: Corrected Left Eye Chart Acuity: 20/40   Both Eyes Visual Acuity: Corrected Both Eyes Chart Acuity: 20/40   Able To Tolerate Visual Acuity:  Yes        Assessment & Plan:   Leann Chatman is a 74 year old female who presents for a Medicare Assessment.     1. Epigastric pain (Primary)  -     Omeprazole; Take 1 capsule (40 mg total) by mouth daily.  Dispense: 90 capsule; Refill: 3  2. Urinary frequency  -     Urine Dip, auto without Micro  -     Urine Culture, Routine; Future; Expected date: 10/30/2024  -     Urine Culture, Routine  3. Glucose intolerance  -     Hemoglobin A1C; Future; Expected date: 10/30/2024  4. Hypertension, unspecified type  -     Comp Metabolic Panel (14); Future; Expected date: 10/30/2024  -     CBC With Differential With Platelet; Future; Expected date: 10/30/2024  -     Lipid Panel; Future; Expected date: 10/30/2024  5. Vitamin D deficiency  -     Vitamin D; Future; Expected date: 10/30/2024  6. Impaired glucose tolerance (oral)  -     Hemoglobin A1C; Future; Expected date: 10/30/2024  7. Visit for screening mammogram  -     3D Mammogram Digital Screen, Bilateral (CPT=77067/46205); Future; Expected date: 10/30/2024  8. Encounter for annual health examination  9. Neurodegenerative disorder (HCC)  10. Dyslipidemia  11. Bipolar disorder, current episode manic severe with psychotic features (HCC)  1. Epigastric pain  She continues on PPI  - Omeprazole 40 MG Oral Capsule Delayed Release; Take 1 capsule (40 mg total) by mouth daily.  Dispense: 90 capsule; Refill: 3    2. Urinary frequency  Urine shows Strep and she was treated ith Augmentin  - Urine Dip, auto without Micro  - Urine Culture, Routine [E]; Future  - Urine Culture, Routine [E]    3. Glucose intolerance  No DM  - Hemoglobin A1C [E]; Future    4. Hypertension, unspecified type  Well controlled, CCM  - Comp Metabolic Panel (14) [E]; Future  - CBC W Differential W Platelet [E]; Future  - Lipid Panel [E]; Future    5. Vitamin D deficiency  Normal level on supplementation; 62  - Vitamin D [E]; Future    6. Impaired glucose tolerance (oral)  repeated  - Hemoglobin A1C [E];  Future    7. Visit for screening mammogram  done  - 3D Mammogram Digital Screen, Bilateral (CPT=77067/23877); Future    8. Encounter for annual health examination  done    9. Neurodegenerative disorder (HCC)  NOS, still has drooling, speech problems and drooping eye lids; gate abnormalities    10. Dyslipidemia  Well controlled, CCM    11. Bipolar disorder, current episode manic severe with psychotic features (HCC)  Well controlled, psych prescribing mediations.     The patient indicates understanding of these issues and agrees to the plan.  Reinforced healthy diet, lifestyle, and exercise.      No follow-ups on file.     Anne Burnett MD, 11/1/2024     Supplementary Documentation:   General Health:  In the past six months, have you lost more than 10 pounds without trying?: 2 - No  Has your appetite been poor?: No  Type of Diet: Balanced  How does the patient maintain a good energy level?: Other  How would you describe your daily physical activity?: Light  How would you describe your current health state?: Fair  How do you maintain positive mental well-being?: Visiting Family  On a scale of 0 to 10, with 0 being no pain and 10 being severe pain, what is your pain level?: 1 - (Mild)  In the past six months, have you experienced urine leakage?: 1-Yes  At any time do you feel concerned for the safety/well-being of yourself and/or your children, in your home or elsewhere?: No  Have you had any immunizations at another office such as Influenza, Hepatitis B, Tetanus, or Pneumococcal?: No    Health Maintenance   Topic Date Due    Mammogram  08/06/2024    COVID-19 Vaccine (4 - 2023-24 season) 09/01/2024    Annual Physical  10/30/2025    Influenza Vaccine  Completed    DEXA Scan  Completed    Annual Depression Screening  Completed    Fall Risk Screening (Annual)  Completed    Pneumococcal Vaccine: 65+ Years  Completed    Zoster Vaccines  Completed    Colorectal Cancer Screening  Discontinued

## 2024-11-04 ENCOUNTER — MED REC SCAN ONLY (OUTPATIENT)
Dept: FAMILY MEDICINE CLINIC | Facility: CLINIC | Age: 74
End: 2024-11-04

## 2024-11-05 ENCOUNTER — TELEPHONE (OUTPATIENT)
Dept: FAMILY MEDICINE CLINIC | Facility: CLINIC | Age: 74
End: 2024-11-05

## 2024-11-05 DIAGNOSIS — G89.29 CHRONIC BACK PAIN, UNSPECIFIED BACK LOCATION, UNSPECIFIED BACK PAIN LATERALITY: ICD-10-CM

## 2024-11-05 DIAGNOSIS — M54.9 CHRONIC BACK PAIN, UNSPECIFIED BACK LOCATION, UNSPECIFIED BACK PAIN LATERALITY: ICD-10-CM

## 2024-11-05 DIAGNOSIS — M48.061 SPINAL STENOSIS AT L4-L5 LEVEL: Primary | ICD-10-CM

## 2024-11-05 NOTE — TELEPHONE ENCOUNTER
Patient advised. She would like to to go to the pain clinic at [facility]. She is currently being seen at [facility].

## 2024-11-05 NOTE — TELEPHONE ENCOUNTER
No, she takes a protein supplement and she eats eggs and beans.  Use beano/gaviscon if you have to for gas. You have arthritis in the neck.

## 2024-11-05 NOTE — TELEPHONE ENCOUNTER
Patient said that she cannot chew meat. Should she be taking a supplement?  She said that she has a lot of acid reflux and her stomach is bloated.   She said that she has neck pain and frequent headaches since her fall 2 years ago. She said that when she moves her neck back and forth she feels crunching. She said that she has been getting injections at [facility] for the back pain.  She said that when she had an ultrasound about 5 years ago it showed she had a fatty liver. She said her last lab results were normal. Should she make an appointment with Dr Burnett?

## 2024-11-06 ENCOUNTER — HOME HEALTH CHARGES (OUTPATIENT)
Dept: FAMILY MEDICINE CLINIC | Facility: CLINIC | Age: 74
End: 2024-11-06

## 2024-11-06 DIAGNOSIS — G24.01 DYSKINESIA, DRUG-INDUCED: Primary | ICD-10-CM

## 2024-11-11 ENCOUNTER — TELEPHONE (OUTPATIENT)
Dept: FAMILY MEDICINE CLINIC | Facility: CLINIC | Age: 74
End: 2024-11-11

## 2024-11-11 PROCEDURE — G0179 MD RECERTIFICATION HHA PT: HCPCS | Performed by: INTERNAL MEDICINE

## 2024-11-11 NOTE — TELEPHONE ENCOUNTER
Patient states that she is having burning from her hiatal hernia all the time even though she is taking the Omeprazole.   She said that she is still having frequent urination after completing the antibiotics.   Appointment with Dr Burnett rescheduled from next Thursday to this Thursday at 945am. Patient advised to discuss these issues at this appointment.   
WANTS TO TALK TO NURSE ABOUT HER HIATAL HERNIA, CALL PATIENT BACK  
[facility]

## 2024-11-12 ENCOUNTER — TELEPHONE (OUTPATIENT)
Dept: FAMILY MEDICINE CLINIC | Facility: CLINIC | Age: 74
End: 2024-11-12

## 2024-11-12 NOTE — TELEPHONE ENCOUNTER
Patient states that she took Metoprolol 25mg and Amlodipine 10mg at 8am. She said when the nurse came in at 9am. Her blood pressure was 142/70. She just took it at 4pm and it was 166/73.  The home health nurse had called on 10/31/24 and said patient's blood pressure was running low 112/56, she was advised to have patient reduce the Amlodipine to 5mg if it is running low.

## 2024-11-12 NOTE — TELEPHONE ENCOUNTER
Patient advised to take the Metoprolol 25mg and Amlodipine 10mg in the am then check Blood pressure about 1/2 hour after taking it then call with results. NAVEED Stanley/Vikash PEDRO

## 2024-11-13 ENCOUNTER — TELEPHONE (OUTPATIENT)
Dept: FAMILY MEDICINE CLINIC | Facility: CLINIC | Age: 74
End: 2024-11-13

## 2024-11-13 NOTE — TELEPHONE ENCOUNTER
Patient calling to provide nurse with bloood pressure reads from today 11/13/24, 9:00am 150/79 and 9:30 162/71

## 2024-11-14 ENCOUNTER — HOSPITAL ENCOUNTER (OUTPATIENT)
Dept: GENERAL RADIOLOGY | Age: 74
Discharge: HOME | End: 2024-11-14
Attending: INTERNAL MEDICINE
Payer: MEDICARE

## 2024-11-14 ENCOUNTER — OFFICE VISIT (OUTPATIENT)
Dept: FAMILY MEDICINE CLINIC | Facility: CLINIC | Age: 74
End: 2024-11-14
Payer: MEDICARE

## 2024-11-14 ENCOUNTER — TELEPHONE (OUTPATIENT)
Dept: FAMILY MEDICINE CLINIC | Facility: CLINIC | Age: 74
End: 2024-11-14

## 2024-11-14 VITALS
TEMPERATURE: 99 F | DIASTOLIC BLOOD PRESSURE: 68 MMHG | SYSTOLIC BLOOD PRESSURE: 124 MMHG | HEART RATE: 67 BPM | OXYGEN SATURATION: 97 % | RESPIRATION RATE: 22 BRPM

## 2024-11-14 DIAGNOSIS — G89.29 NECK PAIN, CHRONIC: Primary | ICD-10-CM

## 2024-11-14 DIAGNOSIS — M54.2 NECK PAIN, CHRONIC: ICD-10-CM

## 2024-11-14 DIAGNOSIS — M54.2 NECK PAIN, CHRONIC: Primary | ICD-10-CM

## 2024-11-14 DIAGNOSIS — G89.29 NECK PAIN, CHRONIC: ICD-10-CM

## 2024-11-14 DIAGNOSIS — K21.9 GASTROESOPHAGEAL REFLUX DISEASE WITHOUT ESOPHAGITIS: ICD-10-CM

## 2024-11-14 PROCEDURE — 72050 X-RAY EXAM NECK SPINE 4/5VWS: CPT | Performed by: INTERNAL MEDICINE

## 2024-11-14 PROCEDURE — 99214 OFFICE O/P EST MOD 30 MIN: CPT | Performed by: INTERNAL MEDICINE

## 2024-11-14 RX ORDER — CELECOXIB 200 MG/1
200 CAPSULE ORAL DAILY
Qty: 90 CAPSULE | Refills: 0 | Status: SHIPPED | OUTPATIENT
Start: 2024-11-14 | End: 2025-02-12

## 2024-11-14 RX ORDER — ESOMEPRAZOLE MAGNESIUM 40 MG/1
40 CAPSULE, DELAYED RELEASE ORAL DAILY
Qty: 90 CAPSULE | Refills: 3 | Status: SHIPPED | OUTPATIENT
Start: 2024-11-14 | End: 2025-11-09

## 2024-11-14 NOTE — PROGRESS NOTES
Leann Chatman is a 74 year old female.  HPI:   Pt has abdominal pain and feels as if her PPI is not working as well as NEXIUM worked for her. She has not had a follow up colonoscopy since 2015--path hyperplastic polp. EGD  5/13/2020 [facility] and esophagram shows small to moderate sized HH.    Final Diagnosis:    A- Distal esophageal biopsy:   -Fragments of esophageal squamous epithelium without diagnostic pathology.   -No evidence of eosinophilic infiltrate.      B- Proximal esophageal biopsy:   -Fragments of esophageal squamous epithelium without diagnostic pathology.   -No evidence of eosinophilic infiltrate.      She also has pain in the neck and feels like she hyperextends her neck to reduce pain.   Current Outpatient Medications   Medication Sig Dispense Refill    celecoxib 200 MG Oral Cap Take 1 capsule (200 mg total) by mouth daily. 90 capsule 0    Esomeprazole Magnesium (NEXIUM) 40 MG Oral Capsule Delayed Release Take 1 capsule (40 mg total) by mouth daily. 90 capsule 3    Omeprazole 40 MG Oral Capsule Delayed Release Take 1 capsule (40 mg total) by mouth daily. 90 capsule 3    LORazepam 0.5 MG Oral Tab Take 1 tablet (0.5 mg total) by mouth in the morning, at noon, and at bedtime. 10 tablet 0    Naloxone HCl 4 MG/0.1ML Nasal Liquid 4 mg by Nasal route as needed. If patient remains unresponsive, repeat dose in other nostril 2-5 minutes after first dose. 1 kit 0    sennosides-docusate 8.6-50 MG Oral Tab Take 1 tablet by mouth daily.      ASPIRIN LOW DOSE 81 MG Oral Tab EC TAKE 1 TABLET BY MOUTH TWICE DAILY FOR SIX WEEKS TOTAL FOR BLOOD CLOT PREVENTION      escitalopram 20 MG Oral Tab Take 1 tablet (20 mg total) by mouth daily.      Lactobacillus (PROBIOTIC ACIDOPHILUS OR) Take 1 capsule by mouth daily.      Omega-3 Fatty Acids (FISH OIL CONCENTRATE OR) Take 1 capsule by mouth daily.      Calcium Carb-Cholecalciferol (CALTRATE BONE [facility] OR) Take 1 tablet by mouth daily.      Multiple Vitamins-Minerals (CENTRUM  SILVER OR) Take 1 tablet by mouth daily.      simvastatin 20 MG Oral Tab Take 1 tablet (20 mg total) by mouth nightly. 90 tablet 3    clopidogrel 75 MG Oral Tab Take 1 tablet (75 mg total) by mouth daily. 90 tablet 3    clobetasol 0.05 % External Cream Apply 1 Application topically daily as needed.      fluticasone propionate 50 MCG/ACT Nasal Suspension 2 sprays by Each Nare route daily as needed.      acetaminophen 325 MG Oral Tab Take 2 tablets (650 mg total) by mouth every 4 (four) hours as needed for Pain.      Cyanocobalamin (B-12) 500 MCG Oral Tab Take 1,000 Units by mouth daily.        amoxicillin clavulanate 500-125 MG Oral Tab Take 1 tablet by mouth in the morning and 1 tablet before bedtime. 14 tablet 0    Valbenazine Tosylate (INGREZZA) 40 MG Oral Cap Take 1 tablet by mouth daily.        Past Medical History:    Anxiety state    Back problem    Spinal Stenosis    Cataract    starting; No Lenses    Closed fracture of transverse process of lumbar vertebra with routine healing    Contact dermatitis and other eczema, due to unspecified cause    Coronary atherosclerosis    Subclavian Stent    Depression    Difficult intubation    pt says she has been told to say she has a small mouth/throat    Diverticulosis    GERD (gastroesophageal reflux disease)    EGD 4/2011    Hearing impairment    otolodosclerosis both ears; implant right ear    Hemorrhoids    Herpes zoster    High blood pressure    High cholesterol    History of lumbar discectomy    History of right-sided carotid endarterectomy    HTN (hypertension)    Hyperlipidemia    Impaired fasting glucose    Irritable bowel    OAB (overactive bladder)    Osteoarthritis    Osteoarthritis of left hip    Osteoarthritis of lumbar spine    Psoriasis    Subclavian arterial stenosis (HCC)    Tardive dyskinesia    Visual impairment    Glasses    Vitamin D deficiency      Social History:  Social History     Socioeconomic History    Marital status:     Number of  children: 2   Occupational History    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   Tobacco Use    Smoking status: Former     Current packs/day: 0.00     Types: Cigarettes     Quit date: 1986     Years since quittin.6    Smokeless tobacco: Never   Vaping Use    Vaping status: Never Used   Substance and Sexual Activity    Alcohol use: Yes     Alcohol/week: 1.0 standard drink of alcohol     Types: 1 Cans of beer per week     Comment: beer sometimes    Drug use: No    Sexual activity: Yes     Partners: Male   Other Topics Concern    Caffeine Concern No     Comment: 2 cups coffee in AM    Exercise No     Comment: 2x per week     Social Drivers of Health     Food Insecurity: Low Risk  (10/3/2024)    Received from [facility]    Food Insecurity     Have there been times that your food ran out, and you didn't have money to get more?: No     Are there times that you worry that this might happen?: No   Transportation Needs: Low Risk  (10/3/2024)    Received from [facility]    Transportation Needs     Do you have trouble getting transportation to medical appointments?: No     How do you normally get to and from your appointments?: Family/Friend   Physical Activity: Inactive (2021)    Received from [facility] [employer]    Exercise Vital Sign     Days of Exercise per Week: 0 days     Minutes of Exercise per Session: 0 min   Housing Stability: Low Risk  (10/3/2024)    Received from [facility]    Housing Stability     Are you worried that your electric, gas, oil, or water might be shut off?: No     Are you concerned about having a safe and reliable place to live?: No        REVIEW OF SYSTEMS:   GENERAL HEALTH: feels well otherwise  SKIN: denies any unusual skin lesions or rashes  RESPIRATORY: denies shortness of breath with exertion  CARDIOVASCULAR: denies chest pain on exertion  GI: denies abdominal pain and denies heartburn  NEURO: denies  headaches    EXAM:   /68   Pulse 67   Temp 98.9 °F (37.2 °C) (Temporal)   Resp 22   SpO2 97%   GENERAL: well developed, well nourished,in no apparent distress  SKIN: no rashes,no suspicious lesions  HEENT: atraumatic, normocephalic,ears and throat are clear  NECK: supple,no adenopathy,no bruits  LUNGS: clear to auscultation  CARDIO: RRR without murmur  GI: good BS's,no masses, HSM or tenderness  EXTREMITIES: no cyanosis, clubbing or edema    ASSESSMENT AND PLAN:     Encounter Diagnoses   Name Primary?    Neck pain, chronic Yes    Gastroesophageal reflux disease without esophagitis    Trial of celebrex and neck Xray to start.  May need repeat EGD if switch to Nexium if not helpful.     No orders of the defined types were placed in this encounter.      Meds & Refills for this Visit:  Requested Prescriptions     Signed Prescriptions Disp Refills    celecoxib 200 MG Oral Cap 90 capsule 0     Sig: Take 1 capsule (200 mg total) by mouth daily.    Esomeprazole Magnesium (NEXIUM) 40 MG Oral Capsule Delayed Release 90 capsule 3     Sig: Take 1 capsule (40 mg total) by mouth daily.       Imaging & Consults:  XR CERVICAL SPINE (4VIEWS) (CPT=72050)    Follow up as needed.     The patient indicates understanding of these issues and agrees to the plan.

## 2024-11-14 NOTE — TELEPHONE ENCOUNTER
Dr. Burnett discussed patient getting a upper GI and she didn't get any direction of who or where she should be getting this done.

## 2024-11-14 NOTE — TELEPHONE ENCOUNTER
I looked and lost was at [facility] in 2020--no [facility] if the switch to nexium is not effective then she can see Dr Vargas who took her G tube out her in [place].

## 2024-11-18 ENCOUNTER — TELEPHONE (OUTPATIENT)
Dept: FAMILY MEDICINE CLINIC | Facility: CLINIC | Age: 74
End: 2024-11-18

## 2024-11-18 DIAGNOSIS — M16.12 PRIMARY OSTEOARTHRITIS OF LEFT HIP: Primary | ICD-10-CM

## 2024-11-18 RX ORDER — FERROUS SULFATE 325(65) MG
325 TABLET ORAL
Qty: 90 TABLET | Refills: 0 | Status: SHIPPED | OUTPATIENT
Start: 2024-11-18 | End: 2025-02-16

## 2024-11-18 RX ORDER — TRAMADOL HYDROCHLORIDE 50 MG/1
50 TABLET ORAL NIGHTLY
Qty: 30 TABLET | Refills: 0 | Status: SHIPPED | OUTPATIENT
Start: 2024-11-18 | End: 2024-12-18

## 2024-11-18 NOTE — TELEPHONE ENCOUNTER
Patient states that she cannot get into the painn clinic until late January or early February. Can Dr Burnett prescribe Tramadol for her neck and back pain. She said that pain is at a level 8 or 9.  She also said Dr Burnett was going to prescribe an Iron supplement. She dose take a MVI daily.

## 2024-11-18 NOTE — TELEPHONE ENCOUNTER
Tramadol once nightly. Maybe you don't need Pain management if you can step down the opioid, but you can't stop abruptly. EXPECT to have an increase in pain due to your body's tolerance to the oxycodone--try to work through it so you are not dependent on these medications. It gets better in the next week

## 2024-11-18 NOTE — TELEPHONE ENCOUNTER
Patient advised, Ferrous Sulfate was sent to [facility].

## 2024-11-19 ENCOUNTER — HOME HEALTH CHARGES (OUTPATIENT)
Dept: FAMILY MEDICINE CLINIC | Facility: CLINIC | Age: 74
End: 2024-11-19

## 2024-11-19 DIAGNOSIS — G24.01 DYSKINESIA, DRUG-INDUCED: Primary | ICD-10-CM

## 2024-12-13 ENCOUNTER — HOSPITAL ENCOUNTER (OUTPATIENT)
Dept: CT IMAGING | Facility: HOSPITAL | Age: 74
Discharge: HOME | End: 2024-12-13
Attending: INTERNAL MEDICINE
Payer: MEDICARE

## 2024-12-13 DIAGNOSIS — I65.23 CAROTID STENOSIS, BILATERAL: ICD-10-CM

## 2024-12-13 LAB
CREAT BLD-MCNC: 0.6 MG/DL
EGFRCR SERPLBLD CKD-EPI 2021: 93 ML/MIN/1.73M2 (ref 60–?)

## 2024-12-13 PROCEDURE — 82565 ASSAY OF CREATININE: CPT

## 2024-12-13 PROCEDURE — 70498 CT ANGIOGRAPHY NECK: CPT | Performed by: INTERNAL MEDICINE

## 2024-12-18 ENCOUNTER — TELEPHONE (OUTPATIENT)
Dept: FAMILY MEDICINE CLINIC | Facility: CLINIC | Age: 74
End: 2024-12-18

## 2024-12-18 NOTE — TELEPHONE ENCOUNTER
Patient said that she takes an Aleve once in awhile and Tylenol for generalized back pain. She said she has been using OTC pain patches but they are not strong enough. She said her daughter use 5% Lidocaine patches.

## 2024-12-19 ENCOUNTER — OFFICE VISIT (OUTPATIENT)
Dept: FAMILY MEDICINE CLINIC | Facility: CLINIC | Age: 74
End: 2024-12-19
Payer: MEDICARE

## 2024-12-19 VITALS
TEMPERATURE: 98 F | DIASTOLIC BLOOD PRESSURE: 68 MMHG | OXYGEN SATURATION: 96 % | BODY MASS INDEX: 23 KG/M2 | RESPIRATION RATE: 22 BRPM | WEIGHT: 126.38 LBS | HEART RATE: 72 BPM | SYSTOLIC BLOOD PRESSURE: 116 MMHG

## 2024-12-19 DIAGNOSIS — G89.29 CHRONIC LOW BACK PAIN WITH SCIATICA, SCIATICA LATERALITY UNSPECIFIED, UNSPECIFIED BACK PAIN LATERALITY: Primary | ICD-10-CM

## 2024-12-19 DIAGNOSIS — M54.40 CHRONIC LOW BACK PAIN WITH SCIATICA, SCIATICA LATERALITY UNSPECIFIED, UNSPECIFIED BACK PAIN LATERALITY: Primary | ICD-10-CM

## 2024-12-19 DIAGNOSIS — M48.061 SPINAL STENOSIS AT L4-L5 LEVEL: ICD-10-CM

## 2024-12-19 PROCEDURE — 99214 OFFICE O/P EST MOD 30 MIN: CPT | Performed by: INTERNAL MEDICINE

## 2024-12-19 RX ORDER — GABAPENTIN 100 MG/1
100 CAPSULE ORAL DAILY
Qty: 30 CAPSULE | Refills: 0 | Status: SHIPPED | OUTPATIENT
Start: 2024-12-19 | End: 2025-01-18

## 2024-12-19 RX ORDER — AMLODIPINE BESYLATE 10 MG/1
10 TABLET ORAL
COMMUNITY
Start: 2024-12-09

## 2024-12-19 RX ORDER — ROPINIROLE 0.5 MG/1
0.5 TABLET, FILM COATED ORAL NIGHTLY
COMMUNITY
Start: 2024-10-15

## 2024-12-19 RX ORDER — METOPROLOL TARTRATE 25 MG/1
TABLET, FILM COATED ORAL
COMMUNITY
Start: 2024-12-09

## 2024-12-19 RX ORDER — POLYETHYLENE GLYCOL 3350 17 G
2 POWDER IN PACKET (EA) ORAL
COMMUNITY

## 2024-12-19 NOTE — PROGRESS NOTES
Leann Chatman is a 74 year old female.  HPI:   Pt has been having more pain in the back and left foot.  She has a hard time standing for any length of time.  Her appetite is poor and she had lost a lot of weight in the last year.  Her hip replacement went well and incision healled completely.   Current Outpatient Medications   Medication Sig Dispense Refill    metoprolol tartrate 25 MG Oral Tab       rOPINIRole 0.5 MG Oral Tab Take 1 tablet (0.5 mg total) by mouth nightly.      amLODIPine 10 MG Oral Tab 1 tablet (10 mg total).      nicotine polacrilex 2 MG Mouth/Throat Lozenge Place 1 lozenge (2 mg total) inside cheek.      gabapentin 100 MG Oral Cap Take 1 capsule (100 mg total) by mouth daily. 30 capsule 0    Ferrous Sulfate 325 (65 Fe) MG Oral Tab Take 1 tablet (325 mg total) by mouth daily with breakfast. 90 tablet 0    Esomeprazole Magnesium (NEXIUM) 40 MG Oral Capsule Delayed Release Take 1 capsule (40 mg total) by mouth daily. 90 capsule 3    amoxicillin clavulanate 500-125 MG Oral Tab Take 1 tablet by mouth in the morning and 1 tablet before bedtime. 14 tablet 0    LORazepam 0.5 MG Oral Tab Take 1 tablet (0.5 mg total) by mouth in the morning, at noon, and at bedtime. 10 tablet 0    sennosides-docusate 8.6-50 MG Oral Tab Take 1 tablet by mouth daily.      ASPIRIN LOW DOSE 81 MG Oral Tab EC TAKE 1 TABLET BY MOUTH TWICE DAILY FOR SIX WEEKS TOTAL FOR BLOOD CLOT PREVENTION      escitalopram 20 MG Oral Tab Take 1 tablet (20 mg total) by mouth daily.      Lactobacillus (PROBIOTIC ACIDOPHILUS OR) Take 1 capsule by mouth daily.      Omega-3 Fatty Acids (FISH OIL CONCENTRATE OR) Take 1 capsule by mouth daily.      Calcium Carb-Cholecalciferol (CALTRATE BONE HEALTH OR) Take 1 tablet by mouth daily.      Multiple Vitamins-Minerals (CENTRUM SILVER OR) Take 1 tablet by mouth daily.      simvastatin 20 MG Oral Tab Take 1 tablet (20 mg total) by mouth nightly. 90 tablet 3    clopidogrel 75 MG Oral Tab Take 1 tablet (75 mg  total) by mouth daily. 90 tablet 3    clobetasol 0.05 % External Cream Apply 1 Application topically daily as needed.      fluticasone propionate 50 MCG/ACT Nasal Suspension 2 sprays by Each Nare route daily as needed.      Cyanocobalamin (B-12) 500 MCG Oral Tab Take 1,000 Units by mouth daily.        Naloxone HCl 4 MG/0.1ML Nasal Liquid 4 mg by Nasal route as needed. If patient remains unresponsive, repeat dose in other nostril 2-5 minutes after first dose. 1 kit 0    acetaminophen 325 MG Oral Tab Take 2 tablets (650 mg total) by mouth every 4 (four) hours as needed for Pain.      Valbenazine Tosylate (INGREZZA) 40 MG Oral Cap Take 1 tablet by mouth daily.        Past Medical History:    Anxiety state    Back problem    Spinal Stenosis    Cataract    starting; No Lenses    Closed fracture of transverse process of lumbar vertebra with routine healing    Contact dermatitis and other eczema, due to unspecified cause    Coronary atherosclerosis    Subclavian Stent    Depression    Difficult intubation    pt says she has been told to say she has a small mouth/throat    Diverticulosis    GERD (gastroesophageal reflux disease)    EGD 4/2011    Hearing impairment    otolodosclerosis both ears; implant right ear    Hemorrhoids    Herpes zoster    High blood pressure    High cholesterol    History of lumbar discectomy    History of right-sided carotid endarterectomy    HTN (hypertension)    Hyperlipidemia    Impaired fasting glucose    Irritable bowel    OAB (overactive bladder)    Osteoarthritis    Osteoarthritis of left hip    Osteoarthritis of lumbar spine    Psoriasis    Subclavian arterial stenosis (HCC)    Tardive dyskinesia    Visual impairment    Glasses    Vitamin D deficiency      Social History:  Social History     Socioeconomic History    Marital status:     Number of children: 2   Occupational History    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   Tobacco Use    Smoking status: Former     Current packs/day: 0.00      Types: Cigarettes     Quit date: 1986     Years since quittin.7    Smokeless tobacco: Never   Vaping Use    Vaping status: Never Used   Substance and Sexual Activity    Alcohol use: Yes     Alcohol/week: 1.0 standard drink of alcohol     Types: 1 Cans of beer per week     Comment: beer sometimes    Drug use: No    Sexual activity: Yes     Partners: Male   Other Topics Concern    Caffeine Concern No     Comment: 2 cups coffee in AM    Exercise No     Comment: 2x per week     Social Drivers of Health     Food Insecurity: Low Risk  (10/3/2024)    Received from [facility]    Food Insecurity     Have there been times that your food ran out, and you didn't have money to get more?: No     Are there times that you worry that this might happen?: No   Transportation Needs: Low Risk  (10/3/2024)    Received from [facility]    Transportation Needs     Do you have trouble getting transportation to medical appointments?: No     How do you normally get to and from your appointments?: Family/Friend   Physical Activity: Inactive (2021)    Received from [facility], [facility]    Exercise Vital Sign     Days of Exercise per Week: 0 days     Minutes of Exercise per Session: 0 min   Housing Stability: Low Risk  (10/3/2024)    Received from [facility]    Housing Stability     Are you worried that your electric, gas, oil, or water might be shut off?: No     Are you concerned about having a safe and reliable place to live?: No        REVIEW OF SYSTEMS:   GENERAL HEALTH: feels well otherwise  SKIN: denies any unusual skin lesions or rashes  RESPIRATORY: denies shortness of breath with exertion  CARDIOVASCULAR: denies chest pain on exertion  GI: denies abdominal pain and denies heartburn  NEURO: denies headaches    EXAM:   /68   Pulse 72   Temp 97.9 °F (36.6 °C) (Temporal)   Resp 22   Wt 126 lb 6 oz (57.3 kg)   SpO2 96%   BMI  23.11 kg/m²   GENERAL: well developed, well nourished,in no apparent distress  SKIN: no rashes,no suspicious lesions  HEENT: atraumatic, normocephalic,ears and throat are clear  NECK: supple,no adenopathy,no bruits  LUNGS: clear to auscultation  CARDIO: RRR without murmur  GI: good BS's,no masses, HSM or tenderness  EXTREMITIES: no cyanosis, clubbing or edema    ASSESSMENT AND PLAN:     Encounter Diagnoses   Name Primary?    Chronic low back pain with sciatica, sciatica laterality unspecified, unspecified back pain laterality Yes    Spinal stenosis at L4-L5 level    Gabapentin, cannot use NSAIDS due to GERD. She is seeing pain management soon.     No orders of the defined types were placed in this encounter.      Meds & Refills for this Visit:  Requested Prescriptions     Signed Prescriptions Disp Refills    gabapentin 100 MG Oral Cap 30 capsule 0     Sig: Take 1 capsule (100 mg total) by mouth daily.       Imaging & Consults:  None    Follow up as needed.     The patient indicates understanding of these issues and agrees to the plan.

## 2024-12-30 RX ORDER — METOPROLOL TARTRATE 25 MG/1
25 TABLET, FILM COATED ORAL 2 TIMES DAILY
Qty: 180 TABLET | Refills: 0 | OUTPATIENT
Start: 2024-12-30

## 2024-12-30 NOTE — TELEPHONE ENCOUNTER
OV 12/19/24  LABS 10/30/24    REFILL dispensed 12/09/24 #90 by Dr. Kim.     No future appointments.    BP Readings from Last 3 Encounters:   12/19/24 116/68   11/14/24 124/68   10/30/24 134/68

## 2025-01-02 RX ORDER — AMOXICILLIN 250 MG
1 CAPSULE ORAL DAILY
Qty: 90 TABLET | Refills: 0 | Status: SHIPPED | OUTPATIENT
Start: 2025-01-02 | End: 2025-04-02

## 2025-01-02 RX ORDER — GABAPENTIN 100 MG/1
100 CAPSULE ORAL DAILY
Qty: 30 CAPSULE | Refills: 0 | Status: SHIPPED | OUTPATIENT
Start: 2025-01-02 | End: 2025-02-01

## 2025-01-02 NOTE — TELEPHONE ENCOUNTER
Gabapentin   Last refill: 12/19/24  qtY: 30  W/ 0 refills  Last ov: 12/19/24    Sennosides docusate  Last refill: 08/09/24  qtY: no data  W/ no data  Last ov 12/19/24  Requested Prescriptions     Pending Prescriptions Disp Refills    gabapentin 100 MG Oral Cap 30 capsule 0     Sig: Take 1 capsule (100 mg total) by mouth daily.    sennosides-docusate 8.6-50 MG Oral Tab  0     Sig: Take 1 tablet by mouth daily.     No future appointments.

## 2025-01-08 ENCOUNTER — MED REC SCAN ONLY (OUTPATIENT)
Dept: FAMILY MEDICINE CLINIC | Facility: CLINIC | Age: 75
End: 2025-01-08

## 2025-01-08 ENCOUNTER — HOME HEALTH CHARGES (OUTPATIENT)
Dept: FAMILY MEDICINE CLINIC | Facility: CLINIC | Age: 75
End: 2025-01-08

## 2025-01-08 ENCOUNTER — TELEPHONE (OUTPATIENT)
Dept: FAMILY MEDICINE CLINIC | Facility: CLINIC | Age: 75
End: 2025-01-08

## 2025-01-08 DIAGNOSIS — G24.01 DRUG-INDUCED DYSKINESIA: Primary | ICD-10-CM

## 2025-01-08 NOTE — TELEPHONE ENCOUNTER
Spoke with patient and asked that she have a copy of the orders brought in prior to scheduling. Her  is coming in this afternoon and will bring them.

## 2025-01-08 NOTE — TELEPHONE ENCOUNTER
Pre-op orders received and patient scheduled for Monday at 10:30 am with Dr. Burnett. Paperwork placed in blue book up front.     Future Appointments   Date Time Provider Department Center   1/13/2025 10:30 AM Anne Burnett MD [facility]

## 2025-01-08 NOTE — TELEPHONE ENCOUNTER
Patient needs to schedule pre-op appointment patient is having eye surgery 1/30/24. Patient has pre-op orders.

## 2025-01-10 PROCEDURE — G0179 MD RECERTIFICATION HHA PT: HCPCS | Performed by: INTERNAL MEDICINE

## 2025-01-13 ENCOUNTER — LABORATORY ENCOUNTER (OUTPATIENT)
Dept: LAB | Age: 75
End: 2025-01-13
Attending: INTERNAL MEDICINE
Payer: MEDICARE

## 2025-01-13 ENCOUNTER — OFFICE VISIT (OUTPATIENT)
Dept: FAMILY MEDICINE CLINIC | Facility: CLINIC | Age: 75
End: 2025-01-13
Payer: MEDICARE

## 2025-01-13 VITALS
TEMPERATURE: 98 F | RESPIRATION RATE: 22 BRPM | DIASTOLIC BLOOD PRESSURE: 64 MMHG | OXYGEN SATURATION: 97 % | BODY MASS INDEX: 23.19 KG/M2 | WEIGHT: 126 LBS | HEIGHT: 62 IN | HEART RATE: 66 BPM | SYSTOLIC BLOOD PRESSURE: 108 MMHG

## 2025-01-13 DIAGNOSIS — R73.01 IMPAIRED FASTING GLUCOSE: ICD-10-CM

## 2025-01-13 DIAGNOSIS — R35.0 URINARY FREQUENCY: ICD-10-CM

## 2025-01-13 DIAGNOSIS — I10 HYPERTENSION, UNSPECIFIED TYPE: Primary | ICD-10-CM

## 2025-01-13 DIAGNOSIS — I10 HYPERTENSION, UNSPECIFIED TYPE: ICD-10-CM

## 2025-01-13 DIAGNOSIS — I65.23 CAROTID STENOSIS, ASYMPTOMATIC, BILATERAL: ICD-10-CM

## 2025-01-13 LAB
ALBUMIN SERPL-MCNC: 4.4 G/DL (ref 3.2–4.8)
ALBUMIN/GLOB SERPL: 1.8 {RATIO} (ref 1–2)
ALP LIVER SERPL-CCNC: 74 U/L
ALT SERPL-CCNC: 15 U/L
ANION GAP SERPL CALC-SCNC: 6 MMOL/L (ref 0–18)
APPEARANCE: CLEAR
AST SERPL-CCNC: 21 U/L (ref ?–34)
BASOPHILS # BLD AUTO: 0.03 X10(3) UL (ref 0–0.2)
BASOPHILS NFR BLD AUTO: 0.3 %
BILIRUB SERPL-MCNC: 0.5 MG/DL (ref 0.2–1.1)
BILIRUBIN: NEGATIVE
BUN BLD-MCNC: 22 MG/DL (ref 9–23)
CALCIUM BLD-MCNC: 9.6 MG/DL (ref 8.7–10.4)
CHLORIDE SERPL-SCNC: 99 MMOL/L (ref 98–112)
CO2 SERPL-SCNC: 30 MMOL/L (ref 21–32)
CREAT BLD-MCNC: 0.59 MG/DL
EGFRCR SERPLBLD CKD-EPI 2021: 93 ML/MIN/1.73M2 (ref 60–?)
EOSINOPHIL # BLD AUTO: 0.11 X10(3) UL (ref 0–0.7)
EOSINOPHIL NFR BLD AUTO: 1.3 %
ERYTHROCYTE [DISTWIDTH] IN BLOOD BY AUTOMATED COUNT: 15.5 %
EST. AVERAGE GLUCOSE BLD GHB EST-MCNC: 105 MG/DL (ref 68–126)
GLOBULIN PLAS-MCNC: 2.5 G/DL (ref 2–3.5)
GLUCOSE (URINE DIPSTICK): NEGATIVE MG/DL
GLUCOSE BLD-MCNC: 107 MG/DL (ref 70–99)
HBA1C MFR BLD: 5.3 % (ref ?–5.7)
HCT VFR BLD AUTO: 37.3 %
HGB BLD-MCNC: 12.4 G/DL
IMM GRANULOCYTES # BLD AUTO: 0.02 X10(3) UL (ref 0–1)
IMM GRANULOCYTES NFR BLD: 0.2 %
KETONES (URINE DIPSTICK): NEGATIVE MG/DL
LEUKOCYTES: NEGATIVE
LYMPHOCYTES # BLD AUTO: 2.14 X10(3) UL (ref 1–4)
LYMPHOCYTES NFR BLD AUTO: 24.8 %
MCH RBC QN AUTO: 29 PG (ref 26–34)
MCHC RBC AUTO-ENTMCNC: 33.2 G/DL (ref 31–37)
MCV RBC AUTO: 87.4 FL
MONOCYTES # BLD AUTO: 0.69 X10(3) UL (ref 0.1–1)
MONOCYTES NFR BLD AUTO: 8 %
MULTISTIX LOT#: NORMAL NUMERIC
NEUTROPHILS # BLD AUTO: 5.64 X10 (3) UL (ref 1.5–7.7)
NEUTROPHILS # BLD AUTO: 5.64 X10(3) UL (ref 1.5–7.7)
NEUTROPHILS NFR BLD AUTO: 65.4 %
NITRITE, URINE: NEGATIVE
OCCULT BLOOD: NEGATIVE
OSMOLALITY SERPL CALC.SUM OF ELEC: 284 MOSM/KG (ref 275–295)
PH, URINE: 7 (ref 4.5–8)
PLATELET # BLD AUTO: 206 10(3)UL (ref 150–450)
POTASSIUM SERPL-SCNC: 4.2 MMOL/L (ref 3.5–5.1)
PROT SERPL-MCNC: 6.9 G/DL (ref 5.7–8.2)
PROTEIN (URINE DIPSTICK): NEGATIVE MG/DL
RBC # BLD AUTO: 4.27 X10(6)UL
SODIUM SERPL-SCNC: 135 MMOL/L (ref 136–145)
SPECIFIC GRAVITY: 1.02 (ref 1–1.03)
URINE-COLOR: YELLOW
UROBILINOGEN,SEMI-QN: 0.2 MG/DL (ref 0–1.9)
WBC # BLD AUTO: 8.6 X10(3) UL (ref 4–11)

## 2025-01-13 PROCEDURE — 36415 COLL VENOUS BLD VENIPUNCTURE: CPT

## 2025-01-13 PROCEDURE — 87086 URINE CULTURE/COLONY COUNT: CPT | Performed by: INTERNAL MEDICINE

## 2025-01-13 PROCEDURE — 93000 ELECTROCARDIOGRAM COMPLETE: CPT | Performed by: INTERNAL MEDICINE

## 2025-01-13 PROCEDURE — 83036 HEMOGLOBIN GLYCOSYLATED A1C: CPT

## 2025-01-13 PROCEDURE — 81003 URINALYSIS AUTO W/O SCOPE: CPT | Performed by: INTERNAL MEDICINE

## 2025-01-13 PROCEDURE — 80053 COMPREHEN METABOLIC PANEL: CPT

## 2025-01-13 PROCEDURE — 99215 OFFICE O/P EST HI 40 MIN: CPT | Performed by: INTERNAL MEDICINE

## 2025-01-13 PROCEDURE — 85025 COMPLETE CBC W/AUTO DIFF WBC: CPT

## 2025-01-13 NOTE — PROGRESS NOTES
Leann Chatman is a 74 year old female who presents for a pre-operative physical exam. Patient is to have BILATERAL LATERAL CANTHOOTOMY under MAC anesthesia , to be done by Dr. Vance Bustos at [facility] on 1/30/2025.      HPI:   Pt complains of vision issues and lid asymmetry.    Current Outpatient Medications   Medication Sig Dispense Refill    gabapentin 100 MG Oral Cap Take 1 capsule (100 mg total) by mouth daily. 30 capsule 0    sennosides-docusate 8.6-50 MG Oral Tab Take 1 tablet by mouth daily. 90 tablet 0    metoprolol tartrate 25 MG Oral Tab       rOPINIRole 0.5 MG Oral Tab Take 1 tablet (0.5 mg total) by mouth nightly.      amLODIPine 10 MG Oral Tab 1 tablet (10 mg total).      nicotine polacrilex 2 MG Mouth/Throat Lozenge Place 1 lozenge (2 mg total) inside cheek.      Ferrous Sulfate 325 (65 Fe) MG Oral Tab Take 1 tablet (325 mg total) by mouth daily with breakfast. 90 tablet 0    Esomeprazole Magnesium (NEXIUM) 40 MG Oral Capsule Delayed Release Take 1 capsule (40 mg total) by mouth daily. 90 capsule 3    LORazepam 0.5 MG Oral Tab Take 1 tablet (0.5 mg total) by mouth in the morning, at noon, and at bedtime. 10 tablet 0    Naloxone HCl 4 MG/0.1ML Nasal Liquid 4 mg by Nasal route as needed. If patient remains unresponsive, repeat dose in other nostril 2-5 minutes after first dose. 1 kit 0    ASPIRIN LOW DOSE 81 MG Oral Tab EC TAKE 1 TABLET BY MOUTH TWICE DAILY FOR SIX WEEKS TOTAL FOR BLOOD CLOT PREVENTION      escitalopram 20 MG Oral Tab Take 1 tablet (20 mg total) by mouth daily.      Lactobacillus (PROBIOTIC ACIDOPHILUS OR) Take 1 capsule by mouth daily.      Omega-3 Fatty Acids (FISH OIL CONCENTRATE OR) Take 1 capsule by mouth daily.      Calcium Carb-Cholecalciferol (CALTRATE BONE HEALTH OR) Take 1 tablet by mouth daily.      Multiple Vitamins-Minerals (CENTRUM SILVER OR) Take 1 tablet by mouth daily.      simvastatin 20 MG Oral Tab Take 1 tablet (20 mg total) by mouth  nightly. 90 tablet 3    clopidogrel 75 MG Oral Tab Take 1 tablet (75 mg total) by mouth daily. 90 tablet 3    clobetasol 0.05 % External Cream Apply 1 Application topically daily as needed.      fluticasone propionate 50 MCG/ACT Nasal Suspension 2 sprays by Each Nare route daily as needed.      acetaminophen 325 MG Oral Tab Take 2 tablets (650 mg total) by mouth every 4 (four) hours as needed for Pain.      Valbenazine Tosylate (INGREZZA) 40 MG Oral Cap Take 1 tablet by mouth daily.      Cyanocobalamin (B-12) 500 MCG Oral Tab Take 1,000 Units by mouth daily.          Allergies: Allergies[1]   Past Medical History:    Anxiety state    Back problem    Spinal Stenosis    Cataract    starting; No Lenses    Closed fracture of transverse process of lumbar vertebra with routine healing    Contact dermatitis and other eczema, due to unspecified cause    Coronary atherosclerosis    Subclavian Stent    Depression    Difficult intubation    pt says she has been told to say she has a small mouth/throat    Diverticulosis    GERD (gastroesophageal reflux disease)    EGD 4/2011    Hearing impairment    otolodosclerosis both ears; implant right ear    Hemorrhoids    Herpes zoster    High blood pressure    High cholesterol    History of lumbar discectomy    History of right-sided carotid endarterectomy    HTN (hypertension)    Hyperlipidemia    Impaired fasting glucose    Irritable bowel    OAB (overactive bladder)    Osteoarthritis    Osteoarthritis of left hip    Osteoarthritis of lumbar spine    Psoriasis    Subclavian arterial stenosis (HCC)    Tardive dyskinesia    Visual impairment    Glasses    Vitamin D deficiency      Past Surgical History:   Procedure Laterality Date    Back surgery  2007    Back surgery  09/18/2017    kyphoplasty T-12 @ [facility]- Dr Doshi     Colonoscopy      2013    Hysterectomy  1989    also tummy tuck    Inner ear surgery proc unlisted Right 1992    MRI compatable wire placed    Mastectomy left       has implants- not due to CA    Mastectomy right      has implants-not due to CA    Other surgical history  2021    Stent subclavian artery Dr. 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   Tobacco Use    Smoking status: Former     Current packs/day: 0.00     Types: Cigarettes     Quit date: 1986     Years since quittin.8    Smokeless tobacco: Never   Vaping Use    Vaping status: Never Used   Substance and Sexual Activity    Alcohol use: Yes     Alcohol/week: 1.0 standard drink of alcohol     Types: 1 Cans of beer per week     Comment: beer sometimes    Drug use: No    Sexual activity: Yes     Partners: Male   Other Topics Concern    Caffeine Concern No     Comment: 2 cups coffee in AM    Exercise No     Comment: 2x per week     Social Drivers of Health     Food Insecurity: No Food Insecurity (2025)    NCSS - Food Insecurity     Worried About Running Out of Food in the Last Year: No     Ran Out of Food in the Last Year: No   Transportation Needs: No Transportation Needs (2025)    NCSS - Transportation     Lack of Transportation: No   Physical Activity: Inactive (2021)    [facility]    Exercise Vital Sign     Days of Exercise per Week: 0 days     Minutes of Exercise per Session: 0 min   Housing Stability: Not At Risk (2025)    NCSS - Housing/Utilities     Has Housing: Yes     Worried About Losing Housing: No      Unable to Get Utilities: No      Occ: retired. : yes. Children: yes.   Exercise: none.  Diet: low carb diet     REVIEW OF SYSTEMS:   GENERAL: feels well otherwise  SKIN: denies any unusual skin lesions  EYES:denies blurred vision or double vision  HEENT: denies nasal congestion, sinus pain or ST  LUNGS: denies shortness of breath with exertion  CARDIOVASCULAR: denies chest pain on exertion  GI: denies abdominal pain,denies heartburn  : denies dysuria, vaginal discharge or itching,periods regular denies nocturia or changes in stream  MUSCULOSKELETAL: denies back pain  NEURO: denies headaches  PSYCHE: denies depression or anxiety  HEMATOLOGIC: denies hx of anemia  ENDOCRINE: denies thyroid history  ALL/ASTHMA: denies hx of allergy or asthma    EXAM:   /64   Pulse 66   Temp 98.4 °F (36.9 °C) (Temporal)   Resp 22   Ht 5' 2\" (1.575 m)   Wt 126 lb (57.2 kg)   SpO2 97%   BMI 23.05 kg/m²   GENERAL: well developed, well nourished,in no apparent distress  SKIN: no rashes,no suspicious lesions  HEENT: atraumatic, normocephalic,ears and throat are clear  EYES:PERRLA, EOMI, normal optic disk,conjunctiva are clear  NECK: supple,no adenopathy,no bruits  CHEST: no chest tenderness  BREAST: no dominant or suspicious mass  LUNGS: clear to auscultation  CARDIO: RRR without murmur  GI: good BS's,no masses, HSM or tenderness  : deferred  RECTAL: good rectal tone, prostate shows no masses, stool is OB negative  MUSCULOSKELETAL: back is not tender,FROM of the back  EXTREMITIES: no cyanosis, clubbing or edema  NEURO: Oriented times three,cranial nerves are intact,motor and sensory are grossly intact    ASSESSMENT AND PLAN:   Leann Chatman is a 74 year old female who presents for a pre-operative physical exam. Patient is to have eye lid surgery, to be done by Dr. Vance Bustos at [facility] on 1/30/2025. Pt has the following conditions: Bipolar disorder, TD, osteoarthritis and well controlled HTN. . Pt has no  significant history of cardiac or pulmonary conditions. Pt is a good surgical candidate. This consult was sent back the referring physician, 1/14/2025.  Recent Results (from the past 24 hours)   EKG with interpretation and Report -IN OFFICE [90341]    Collection Time: 01/13/25 10:59 AM   Result Value Ref Range    Ventricular rate 62 BPM    Atrial rate 62 BPM    P-R Interval 144 ms    QRS Duration 74 ms    Q-T Interval 464 ms    QTC Calculation (Bezet) 470 ms    P Axis 80 degrees    R Axis -1 degrees    T Axis 20 degrees   Hemoglobin A1C [E]    Collection Time: 01/13/25 11:20 AM   Result Value Ref Range    HgbA1C 5.3 <5.7 %    Estimated Average Glucose 105 68 - 126 mg/dL   Comp Metabolic Panel (14) [E]    Collection Time: 01/13/25 11:20 AM   Result Value Ref Range    Glucose 107 (H) 70 - 99 mg/dL    Sodium 135 (L) 136 - 145 mmol/L    Potassium 4.2 3.5 - 5.1 mmol/L    Chloride 99 98 - 112 mmol/L    CO2 30.0 21.0 - 32.0 mmol/L    Anion Gap 6 0 - 18 mmol/L    BUN 22 9 - 23 mg/dL    Creatinine 0.59 0.55 - 1.02 mg/dL    Calcium, Total 9.6 8.7 - 10.4 mg/dL    Calculated Osmolality 284 275 - 295 mOsm/kg    eGFR-Cr 93 >=60 mL/min/1.73m2    AST 21 <34 U/L    ALT 15 10 - 49 U/L    Alkaline Phosphatase 74 55 - 142 U/L    Bilirubin, Total 0.5 0.2 - 1.1 mg/dL    Total Protein 6.9 5.7 - 8.2 g/dL    Albumin 4.4 3.2 - 4.8 g/dL    Globulin  2.5 2.0 - 3.5 g/dL    A/G Ratio 1.8 1.0 - 2.0    Patient Fasting for CMP? Patient not present    CBC W Differential W Platelet [E]    Collection Time: 01/13/25 11:20 AM   Result Value Ref Range    WBC 8.6 4.0 - 11.0 x10(3) uL    RBC 4.27 3.80 - 5.30 x10(6)uL    HGB 12.4 12.0 - 16.0 g/dL    HCT 37.3 35.0 - 48.0 %    .0 150.0 - 450.0 10(3)uL    MCV 87.4 80.0 - 100.0 fL    MCH 29.0 26.0 - 34.0 pg    MCHC 33.2 31.0 - 37.0 g/dL    RDW 15.5 %    Neutrophil Absolute Prelim 5.64 1.50 - 7.70 x10 (3) uL    Neutrophil Absolute 5.64 1.50 - 7.70 x10(3) uL    Lymphocyte Absolute 2.14 1.00 - 4.00 x10(3)  uL    Monocyte Absolute 0.69 0.10 - 1.00 x10(3) uL    Eosinophil Absolute 0.11 0.00 - 0.70 x10(3) uL    Basophil Absolute 0.03 0.00 - 0.20 x10(3) uL    Immature Granulocyte Absolute 0.02 0.00 - 1.00 x10(3) uL    Neutrophil % 65.4 %    Lymphocyte % 24.8 %    Monocyte % 8.0 %    Eosinophil % 1.3 %    Basophil % 0.3 %    Immature Granulocyte % 0.2 %   Urine Dip, auto without Micro    Collection Time: 01/13/25 11:55 AM   Result Value Ref Range    Glucose Urine Negative Negative mg/dL    Bilirubin Urine Negative Negative    Ketones, UA Negative Negative - Trace mg/dL    Spec Gravity 1.020 1.005 - 1.030    Blood Urine Negative Negative    PH Urine 7.0 5.0 - 8.0    Protein Urine Negative Negative - Trace mg/dL    Urobilinogen Urine 0.2 0.2 - 1.0 mg/dL    Nitrite Urine Negative Negative    Leukocyte Esterase Urine Negative Negative    APPEARANCE clear Clear    Color Urine yellow Yellow    Multistix Lot# [id number] Numeric    Multistix Expiration Date 07/31/2025 Date              [1]   Allergies  Allergen Reactions    Ace Inhibitors OTHER (SEE COMMENTS)     Can't recall    Diazepam OTHER (SEE COMMENTS)     Can't recall    Keflex HIVES    Lexapro OTHER (SEE COMMENTS)     \"didn't agree with me\"    Cozaar [Losartan] OTHER (SEE COMMENTS)     Watery eyes, blister under eye    Levaquin OTHER (SEE COMMENTS)     Can't recall    Zyrtec [Cetirizine Hcl] OTHER (SEE COMMENTS)     Can't recall    Mastisol Adhesive RASH     If patient has bandaid on too long will break out in rash,  Patient states she is able to wear latex gloves.

## 2025-01-14 ENCOUNTER — TELEPHONE (OUTPATIENT)
Dept: FAMILY MEDICINE CLINIC | Facility: CLINIC | Age: 75
End: 2025-01-14

## 2025-01-14 LAB
ATRIAL RATE: 62 BPM
P AXIS: 80 DEGREES
P-R INTERVAL: 144 MS
Q-T INTERVAL: 464 MS
QRS DURATION: 74 MS
QTC CALCULATION (BEZET): 470 MS
R AXIS: -1 DEGREES
T AXIS: 20 DEGREES
VENTRICULAR RATE: 62 BPM

## 2025-01-14 NOTE — TELEPHONE ENCOUNTER
Patient advised. She said that the last two times she was in to see Dr Burnett she told her that her esophagus burns even after taking Nexium and Tums.

## 2025-01-14 NOTE — TELEPHONE ENCOUNTER
Patient asked if she should be concerned with the elevated glucose level. Patient advised that the Hgba1c was in the non diabetic range.  She is concerned that her sodium level is still low.

## 2025-01-20 ENCOUNTER — TELEPHONE (OUTPATIENT)
Dept: FAMILY MEDICINE CLINIC | Facility: CLINIC | Age: 75
End: 2025-01-20

## 2025-01-20 DIAGNOSIS — R10.13 EPIGASTRIC PAIN: Primary | ICD-10-CM

## 2025-01-20 NOTE — TELEPHONE ENCOUNTER
Patient's  is on release and states that patient is taking Nexium and Pepcid in the AM and is still having terrible reflux. Her appetite has decreased.

## 2025-01-25 ENCOUNTER — TELEPHONE (OUTPATIENT)
Dept: FAMILY MEDICINE CLINIC | Facility: CLINIC | Age: 75
End: 2025-01-25

## 2025-01-25 NOTE — TELEPHONE ENCOUNTER
WILL BE HAVING EYE SURGERY, HAS QUESTIONS ABOUT IF SHE NEEDS TO STOP TAKING SOME MEDICATIONS, CALL  BACK

## 2025-01-25 NOTE — TELEPHONE ENCOUNTER
Patient is having eyelid surgery on 1/30/25. Should she stop taking Norco, Gabapentin, Pepcid and Nexium.

## 2025-02-01 NOTE — TELEPHONE ENCOUNTER
Requested Prescriptions     Pending Prescriptions Disp Refills    gabapentin 100 MG Oral Cap 30 capsule 0     Sig: Take 1 capsule (100 mg total) by mouth daily.     Last refill 1/2/25 #30  LOV 1/13/25  No future appointments.

## 2025-02-03 RX ORDER — GABAPENTIN 100 MG/1
100 CAPSULE ORAL DAILY
Qty: 90 CAPSULE | Refills: 0 | Status: SHIPPED | OUTPATIENT
Start: 2025-02-03 | End: 2025-05-04

## 2025-02-10 ENCOUNTER — TELEPHONE (OUTPATIENT)
Dept: FAMILY MEDICINE CLINIC | Facility: CLINIC | Age: 75
End: 2025-02-10

## 2025-02-10 NOTE — TELEPHONE ENCOUNTER
Patient's  said that patient has been having some abdominal pain in her upper abdomen and esophagus. She said her stool has been black for 3 days. She has been taking Pepto Bismol since last week. Chente states they did not make an appointment with Dr Vargas because he forgot who to call. Chente advised to call today and make an appointment for patient with Dr Vargas.

## 2025-02-12 NOTE — TELEPHONE ENCOUNTER
Chente said that they saw Dr Vargas yesterday. She is going to have an endoscopy and colonoscopy on 2/23/25 with Dr Vargas.

## 2025-02-20 ENCOUNTER — MED REC SCAN ONLY (OUTPATIENT)
Dept: FAMILY MEDICINE CLINIC | Facility: CLINIC | Age: 75
End: 2025-02-20

## 2025-03-04 RX ORDER — CLOPIDOGREL BISULFATE 75 MG/1
75 TABLET ORAL DAILY
Qty: 90 TABLET | Refills: 3 | Status: SHIPPED | OUTPATIENT
Start: 2025-03-04

## 2025-03-04 NOTE — TELEPHONE ENCOUNTER
Last refill: 12/21/23  Qty: 90  W/ 3 refills  Last ov: 01/13/25    Requested Prescriptions     Pending Prescriptions Disp Refills    CLOPIDOGREL 75 MG Oral Tab [Pharmacy Med Name: Clopidogrel Bisulfate Oral Tablet 75 MG] 90 tablet 3     Sig: TAKE 1 TABLET EVERY DAY     No future appointments.

## 2025-03-05 ENCOUNTER — TELEPHONE (OUTPATIENT)
Dept: FAMILY MEDICINE CLINIC | Facility: CLINIC | Age: 75
End: 2025-03-05

## 2025-03-05 DIAGNOSIS — G25.0 ESSENTIAL PALATAL TREMOR: Primary | ICD-10-CM

## 2025-03-05 NOTE — TELEPHONE ENCOUNTER
Patient's  said that she went and saw a neurologist and they told her that her speech issues are neurological and she needs speech therapy.   He said that she has had speech therapy several times in the home. He said he doesn't remember who the company was.   Patient did have home health nursing services with [facility] and they were recently discontinued. They do have speech therapy available. They requested the progress notes and order be faxed to them at [phone].

## 2025-03-06 ENCOUNTER — TELEPHONE (OUTPATIENT)
Dept: FAMILY MEDICINE CLINIC | Facility: CLINIC | Age: 75
End: 2025-03-06

## 2025-03-06 PROBLEM — G25.0 ESSENTIAL PALATAL TREMOR: Status: ACTIVE | Noted: 2025-03-06

## 2025-03-07 ENCOUNTER — TELEPHONE (OUTPATIENT)
Dept: FAMILY MEDICINE CLINIC | Facility: CLINIC | Age: 75
End: 2025-03-07

## 2025-03-07 NOTE — TELEPHONE ENCOUNTER
Spoke with  who states patient is complaining that her tongue is swelling up and she is having a hard time breathing. She states this has been going on x 1 week, but progressively getting worse. She does not feel like her throat is closing, and her oxygen on room air is at 92%. However, as the swelling is getting worse, patient feels like she is having a harder time breathing. Advised she should go to the ER for evaluation.  going to take her to [facility]. Will forward to Dr. Burnett as an FYI.

## 2025-03-08 ENCOUNTER — TELEPHONE (OUTPATIENT)
Dept: FAMILY MEDICINE CLINIC | Facility: CLINIC | Age: 75
End: 2025-03-08

## 2025-03-08 NOTE — TELEPHONE ENCOUNTER
Patient states she did not go to the ER. She states her lips are swollen because her teeth keep hittine them. She denies having any difficulty breathing or swallowing except when eating. Patient advised to go to the Er if any of the afore mentioned symptoms occur.  She has not gotten results from the EGD yet, patient advised to inform Dr Vargas of her current symptoms.   Patient states that the neurologist is supposed to order speech therapy virtually.

## 2025-03-08 NOTE — TELEPHONE ENCOUNTER
Yes, she called yesterday about tongue swelling and was to go to ER, but I don't see record of that. SHe had a procedure this Dr Vargas 2/27 EGD/COLON

## 2025-03-08 NOTE — TELEPHONE ENCOUNTER
advised. He said he would like the names. Given contact information for Dr Daren Tobin and Dr Villarreal.  
 wants to know if David has a special orthodontics surgeon.  
No, these are all private, I think she has seen multiple consultant for her teeth.   
Patient's  advised.   
27-May-2024 17:42

## 2025-03-11 PROCEDURE — G0179 MD RECERTIFICATION HHA PT: HCPCS | Performed by: INTERNAL MEDICINE

## 2025-03-12 RX ORDER — METOPROLOL TARTRATE 25 MG/1
25 TABLET, FILM COATED ORAL DAILY
Qty: 90 TABLET | Refills: 1 | Status: SHIPPED | OUTPATIENT
Start: 2025-03-12 | End: 2025-09-08

## 2025-03-24 ENCOUNTER — TELEPHONE (OUTPATIENT)
Dept: FAMILY MEDICINE CLINIC | Facility: CLINIC | Age: 75
End: 2025-03-24

## 2025-03-24 ENCOUNTER — MED REC SCAN ONLY (OUTPATIENT)
Dept: FAMILY MEDICINE CLINIC | Facility: CLINIC | Age: 75
End: 2025-03-24

## 2025-03-24 NOTE — TELEPHONE ENCOUNTER
Daughter Chrystal said that patient drools all the time. She said that she chokes on the saliva. She is asking if Dr Burnett would be willing to have patient try a Scopolamine Patch.

## 2025-03-25 ENCOUNTER — TELEPHONE (OUTPATIENT)
Dept: FAMILY MEDICINE CLINIC | Facility: CLINIC | Age: 75
End: 2025-03-25

## 2025-03-25 NOTE — TELEPHONE ENCOUNTER
Patient and daughter advised. Patient states that she cannot see because the bottom lid comes up so high you cannot see the white part of her eye.  She said that she saw a Dr Silva at [facility] and he was not helpful.

## 2025-03-25 NOTE — TELEPHONE ENCOUNTER
Patient advised. She said that she has an appointment with the eye doctor that did her surgery in 2 weeks.

## 2025-03-25 NOTE — TELEPHONE ENCOUNTER
I'm afraid at this point this is cosmetic, and I don't know that its worth seeing a plastic surgeon to have the skin on the face reconstructed. It is costly.  Your problem is like having new glasses or bifocals for the first time;  you have to learn to turn your head more on the right periphery. There is no problem with your central vision.

## 2025-03-25 NOTE — TELEPHONE ENCOUNTER
If she it taking about Vance Bustos at [facility] who did a 2 mm lateral right canthotomy why is she not returning to him?  It didn't help?   She was referred to and saw a different [facility] Ophthomologist, Dr Fabian Bustos and they did not offer her surgery. But told her to use drops 1/2024.  I know very little about eyes. But if its not neurologic and opening the angle of the eye did not help she can ask a 4th opinion, but I would not suspect this has an easy answer and I don't think more surgery will help at all. [facility] could review what's been done and give an opinion, but I'm not sure what she is looking for.

## 2025-03-25 NOTE — TELEPHONE ENCOUNTER
It can cause eye redness and blurred vision and she is not happy with her vision already. I think this is likely not going to be tolerated by her due to the muscarinic side effects, including agitation, confusion, disorientation.

## 2025-03-25 NOTE — TELEPHONE ENCOUNTER
WHY? Is she still unhappy with her repair of the eye lids or is this a new problem?  Who has she seen and have they told her something like there is nothing more they can do.

## 2025-03-25 NOTE — TELEPHONE ENCOUNTER
Patient advised. She said she is receiving speech therapy. She said that the drooling started when she had her tooth filed. She is going to a dentist April 2nd. She does not want to take anymore medication.

## 2025-03-25 NOTE — TELEPHONE ENCOUNTER
Chrystal said that patient had a surgery a few weeks ago and she is having difficulty with her vision out of her right eye. She said that the neurologist told them that the issues with her vision were caused by the surgery.

## 2025-03-25 NOTE — TELEPHONE ENCOUNTER
Chin tuck/ swallow, wiping cloth, we tried to get speech to the house, but no agency that goes there is available for in home therapy.

## 2025-03-25 NOTE — TELEPHONE ENCOUNTER
Leann looking for a eye doctor that is not affiliated with [facility]. She does not want to go to [facility] for any more eye issues. Daughter calling for her mom.

## 2025-03-31 RX ORDER — AMOXICILLIN 250 MG
1 CAPSULE ORAL DAILY
Qty: 90 TABLET | Refills: 0 | Status: SHIPPED | OUTPATIENT
Start: 2025-03-31 | End: 2025-06-29

## 2025-04-01 ENCOUNTER — TELEPHONE (OUTPATIENT)
Dept: FAMILY MEDICINE CLINIC | Facility: CLINIC | Age: 75
End: 2025-04-01

## 2025-04-01 DIAGNOSIS — K11.7 DROOLING: Primary | ICD-10-CM

## 2025-04-01 NOTE — TELEPHONE ENCOUNTER
Patient's  said that they received a letter from [facility] home with [facility]. Is this a service that they should use?

## 2025-04-01 NOTE — TELEPHONE ENCOUNTER
Patient's  states that she has an appointment to see Dr D[facility] in [place] tomorrow. They need a referral.

## 2025-04-02 ENCOUNTER — HOME HEALTH CHARGES (OUTPATIENT)
Dept: FAMILY MEDICINE CLINIC | Facility: CLINIC | Age: 75
End: 2025-04-02

## 2025-04-02 DIAGNOSIS — S52.514D NONDISPLACED FRACTURE OF RIGHT RADIAL STYLOID PROCESS, SUBSEQUENT ENCOUNTER FOR CLOSED FRACTURE WITH ROUTINE HEALING: Primary | ICD-10-CM

## 2025-04-14 ENCOUNTER — TELEPHONE (OUTPATIENT)
Dept: FAMILY MEDICINE CLINIC | Facility: CLINIC | Age: 75
End: 2025-04-14

## 2025-04-14 NOTE — TELEPHONE ENCOUNTER
Calling in to speak to teressa, psr asked if patient is okay and they stated yes, just would like to speak with teressa

## 2025-04-14 NOTE — TELEPHONE ENCOUNTER
Patient said she started coughing a couple weeks ago and she has been wheezing. She said she has been using her nebulizer twice daily and that loosens the mucous. She said that the mucous comes out of her mouth and is making her lips swollen and chapped. Is there anything she can take?  She is going to a specialist next week about getting her teeth faxed.

## 2025-04-15 ENCOUNTER — OFFICE VISIT (OUTPATIENT)
Dept: FAMILY MEDICINE CLINIC | Facility: CLINIC | Age: 75
End: 2025-04-15
Payer: MEDICARE

## 2025-04-15 VITALS
HEART RATE: 79 BPM | OXYGEN SATURATION: 93 % | RESPIRATION RATE: 20 BRPM | TEMPERATURE: 99 F | DIASTOLIC BLOOD PRESSURE: 72 MMHG | SYSTOLIC BLOOD PRESSURE: 136 MMHG

## 2025-04-15 DIAGNOSIS — K11.7 DROOLING FROM LEFT SIDE OF MOUTH: Primary | ICD-10-CM

## 2025-04-15 PROCEDURE — 99214 OFFICE O/P EST MOD 30 MIN: CPT | Performed by: INTERNAL MEDICINE

## 2025-04-15 RX ORDER — SCOPOLAMINE 1 MG/3D
1 PATCH, EXTENDED RELEASE TRANSDERMAL
Qty: 10 PATCH | Refills: 0 | Status: SHIPPED | OUTPATIENT
Start: 2025-04-15 | End: 2025-05-15

## 2025-04-15 NOTE — PROGRESS NOTES
Leann Chatman is a 74 year old female.  HPI:   Pt is having more bulbar symptoms, she has a hard time closing her mouth, and cannot breath through her mouth. She has made another appt with a dentist, but some of her symptoms of speech, tongue, and swallowing are related to her neurologic disorder. She reports she has a ton of mucus and chapped lips.   Current Medications[1]   Past Medical History[2]   Social History:  Short Social Hx on File[3]     REVIEW OF SYSTEMS:   GENERAL HEALTH: feels well otherwise  SKIN: denies any unusual skin lesions or rashes  RESPIRATORY: denies shortness of breath with exertion  CARDIOVASCULAR: denies chest pain on exertion  GI: denies abdominal pain and denies heartburn  NEURO: denies headaches    EXAM:   /72   Pulse 79   Temp 98.5 °F (36.9 °C) (Temporal)   Resp 20   SpO2 93%   GENERAL: well developed, well nourished,in no apparent distress  SKIN: no rashes,no suspicious lesions  HEENT: atraumatic, normocephalic,ears and throat are clear  NECK: supple,no adenopathy,no bruits  LUNGS: clear to auscultation  CARDIO: RRR without murmur  GI: good BS's,no masses, HSM or tenderness  EXTREMITIES: no cyanosis, clubbing or edema    ASSESSMENT AND PLAN:     Encounter Diagnosis   Name Primary?    Drooling from left side of mouth Yes   I think any further manipulation of her face could cause more problems in the future. TRIAL of scopolamine.     No orders of the defined types were placed in this encounter.      Meds & Refills for this Visit:  Requested Prescriptions     Signed Prescriptions Disp Refills    Scopolamine 1.5mg TD patch 1mg/3days 10 patch 0     Sig: Place 1 patch onto the skin every third day.       Imaging & Consults:  None    Follow up as needed.     The patient indicates understanding of these issues and agrees to the plan.         [1]   Current Outpatient Medications   Medication Sig Dispense Refill    Scopolamine 1.5mg TD patch 1mg/3days Place 1 patch onto the skin  every third day. 10 patch 0    sennosides-docusate 8.6-50 MG Oral Tab Take 1 tablet by mouth daily. 90 tablet 0    metoprolol tartrate 25 MG Oral Tab Take 1 tablet (25 mg total) by mouth daily. 90 tablet 1    CLOPIDOGREL 75 MG Oral Tab TAKE 1 TABLET EVERY DAY 90 tablet 3    gabapentin 100 MG Oral Cap Take 1 capsule (100 mg total) by mouth daily. 90 capsule 0    rOPINIRole 0.5 MG Oral Tab Take 1 tablet (0.5 mg total) by mouth nightly.      amLODIPine 10 MG Oral Tab 1 tablet (10 mg total).      nicotine polacrilex 2 MG Mouth/Throat Lozenge Place 1 lozenge (2 mg total) inside cheek.      Esomeprazole Magnesium (NEXIUM) 40 MG Oral Capsule Delayed Release Take 1 capsule (40 mg total) by mouth daily. 90 capsule 3    LORazepam 0.5 MG Oral Tab Take 1 tablet (0.5 mg total) by mouth in the morning, at noon, and at bedtime. 10 tablet 0    Naloxone HCl 4 MG/0.1ML Nasal Liquid 4 mg by Nasal route as needed. If patient remains unresponsive, repeat dose in other nostril 2-5 minutes after first dose. 1 kit 0    ASPIRIN LOW DOSE 81 MG Oral Tab EC TAKE 1 TABLET BY MOUTH TWICE DAILY FOR SIX WEEKS TOTAL FOR BLOOD CLOT PREVENTION      escitalopram 20 MG Oral Tab Take 1 tablet (20 mg total) by mouth daily.      Lactobacillus (PROBIOTIC ACIDOPHILUS OR) Take 1 capsule by mouth daily.      Omega-3 Fatty Acids (FISH OIL CONCENTRATE OR) Take 1 capsule by mouth daily.      Calcium Carb-Cholecalciferol (CALTRATE BONE HEALTH OR) Take 1 tablet by mouth daily.      Multiple Vitamins-Minerals (CENTRUM SILVER OR) Take 1 tablet by mouth daily.      simvastatin 20 MG Oral Tab Take 1 tablet (20 mg total) by mouth nightly. 90 tablet 3    clobetasol 0.05 % External Cream Apply 1 Application topically daily as needed.      fluticasone propionate 50 MCG/ACT Nasal Suspension 2 sprays by Each Nare route daily as needed.      acetaminophen 325 MG Oral Tab Take 2 tablets (650 mg total) by mouth every 4 (four) hours as needed for Pain.      Valbenazine Tosylate  (INGREZZA) 40 MG Oral Cap Take 1 tablet by mouth daily.      Cyanocobalamin (B-12) 500 MCG Oral Tab Take 1,000 Units by mouth daily.       [2]   Past Medical History:   Anxiety state    Back problem    Spinal Stenosis    Cataract    starting; No Lenses    Closed fracture of transverse process of lumbar vertebra with routine healing    Contact dermatitis and other eczema, due to unspecified cause    Coronary atherosclerosis    Subclavian Stent    Depression    Difficult intubation    pt says she has been told to say she has a small mouth/throat    Diverticulosis    GERD (gastroesophageal reflux disease)    EGD 2011    Hearing impairment    otolodosclerosis both ears; implant right ear    Hemorrhoids    Herpes zoster    High blood pressure    High cholesterol    History of lumbar discectomy    History of right-sided carotid endarterectomy    HTN (hypertension)    Hyperlipidemia    Impaired fasting glucose    Irritable bowel    OAB (overactive bladder)    Osteoarthritis    Osteoarthritis of left hip    Osteoarthritis of lumbar spine    Psoriasis    Subclavian arterial stenosis    Tardive dyskinesia    Visual impairment    Glasses    Vitamin D deficiency   [3]   Social History  Socioeconomic History    Marital status:     Number of children: 2   Occupational History    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   Tobacco Use    Smoking status: Former     Current packs/day: 0.00     Types: Cigarettes     Quit date: 1986     Years since quittin.0    Smokeless tobacco: Never   Vaping Use    Vaping status: Never Used   Substance and Sexual Activity    Alcohol use: Yes     Alcohol/week: 1.0 standard drink of alcohol     Types: 1 Cans of beer per week     Comment: beer sometimes    Drug use: No    Sexual activity: Yes     Partners: Male   Other Topics Concern    Caffeine Concern No     Comment: 2 cups coffee in AM    Exercise No     Comment: 2x per week     Social Drivers of Health     Food Insecurity: No Food Insecurity  (1/13/2025)    NCSS - Food Insecurity     Worried About Running Out of Food in the Last Year: No     Ran Out of Food in the Last Year: No   Transportation Needs: No Transportation Needs (1/13/2025)    NCSS - Transportation     Lack of Transportation: No   Housing Stability: Not At Risk (1/13/2025)    NCSS - Housing/Utilities     Has Housing: Yes     Worried About Losing Housing: No     Unable to Get Utilities: No

## 2025-04-22 ENCOUNTER — TELEPHONE (OUTPATIENT)
Dept: FAMILY MEDICINE CLINIC | Facility: CLINIC | Age: 75
End: 2025-04-22

## 2025-04-22 NOTE — TELEPHONE ENCOUNTER
Patient called and would like to speak with nurse. The patch that  gave patient she is unable to use.

## 2025-04-22 NOTE — TELEPHONE ENCOUNTER
Patient's  advised to try taking plain Mucinex again, limit dairy if possible. JAZMYNE. Dr Burnett/[facility] PEDRO

## 2025-04-22 NOTE — TELEPHONE ENCOUNTER
Patient said that the Scopolamine patch dries her out too much, she is going to stop it.  She said that the dentist she went to in [place] and he is sending her to another special dentist to correct her bite.  She said she is still full of phlegm and  her tongue is dry. She said that she has tried Mucinex and it doesn't work.

## 2025-04-23 ENCOUNTER — TELEPHONE (OUTPATIENT)
Dept: FAMILY MEDICINE CLINIC | Facility: CLINIC | Age: 75
End: 2025-04-23

## 2025-04-23 DIAGNOSIS — I10 HYPERTENSION, UNSPECIFIED TYPE: Primary | ICD-10-CM

## 2025-04-23 RX ORDER — ROPINIROLE 0.5 MG/1
0.5 TABLET, FILM COATED ORAL NIGHTLY
Qty: 90 TABLET | Refills: 1 | Status: SHIPPED | OUTPATIENT
Start: 2025-04-23 | End: 2025-10-20

## 2025-04-23 NOTE — TELEPHONE ENCOUNTER
Chente said that patient is still having restless leg syndrome and has been taking this medication.

## 2025-05-05 DIAGNOSIS — E78.5 HYPERLIPIDEMIA, UNSPECIFIED HYPERLIPIDEMIA TYPE: ICD-10-CM

## 2025-05-05 RX ORDER — SIMVASTATIN 20 MG
20 TABLET ORAL NIGHTLY
Qty: 90 TABLET | Refills: 3 | Status: SHIPPED | OUTPATIENT
Start: 2025-05-05

## 2025-05-05 NOTE — TELEPHONE ENCOUNTER
Last OV w/Dr Burnett was 4/15/25  Last CMP was 1/13/25   Last Lipid was 10/30/24.  Last refill of Metoprolol was 3/12/25 #90 w/1 refill to [facility]. They do have a refill for her available but not due until 5/20/25.  Last refill of Simvastatin was 3/22/24 #90 w/3 refills

## 2025-05-06 ENCOUNTER — TELEPHONE (OUTPATIENT)
Dept: FAMILY MEDICINE CLINIC | Facility: CLINIC | Age: 75
End: 2025-05-06

## 2025-05-06 NOTE — TELEPHONE ENCOUNTER
I think she may need her daughter to come and stay with her in the near future, or she should go there.

## 2025-05-06 NOTE — TELEPHONE ENCOUNTER
Chrystal states that Leann's  Chente is in the hospital. She needs to take take time off to help care for her Mom. Chrystal advised Dr Burnett will fill out [facility] paperwork. She will have her employer fax it.

## 2025-05-06 NOTE — TELEPHONE ENCOUNTER
Patient states that her blood pressure this morning was 168/86 prior to taking her blood pressure medication. She said that it was 124/70 after taking the Amlodipine and Metoprolol. She said she felt like her heart was racing,. Her blood pressure was 141/78 and pulse was 72.

## 2025-05-06 NOTE — TELEPHONE ENCOUNTER
Daughter Chrystal calling to see if she can get FMLA paperwork filled our for Leann / herself    Patient needs to be able to have intermittent fmla for patient's care    Would like nurse call back

## 2025-05-07 NOTE — TELEPHONE ENCOUNTER
Called patients cell phone and her  answered from his hospital bed. LM on patient's home phone to CB 210pm.

## 2025-05-08 ENCOUNTER — TELEPHONE (OUTPATIENT)
Dept: FAMILY MEDICINE CLINIC | Facility: CLINIC | Age: 75
End: 2025-05-08

## 2025-05-08 RX ORDER — METOPROLOL TARTRATE 25 MG/1
TABLET, FILM COATED ORAL
COMMUNITY
Start: 2025-05-08 | End: 2025-05-12

## 2025-05-08 NOTE — TELEPHONE ENCOUNTER
Patient advised. Call us Monday with blood pressure readings. She said she is going to see a specialists for her jaw at [place] in June.

## 2025-05-08 NOTE — TELEPHONE ENCOUNTER
Patient said that her blood pressure has still been running high. She she signed up for primary home through [facility] and nurse came out and saw her. She is taking Metoprolol Tartrate 25mg and Amlodipine 10mg daily.  Her blood pressures are as follows;  Tues am 168/86 then 124/70 after medications,   Wed am 141/80 HR 78, 154/76 after meds then 156/75 in the afternoon. Today it was 148/81.

## 2025-05-12 ENCOUNTER — TELEPHONE (OUTPATIENT)
Dept: FAMILY MEDICINE CLINIC | Facility: CLINIC | Age: 75
End: 2025-05-12

## 2025-05-12 RX ORDER — METOPROLOL TARTRATE 25 MG/1
TABLET, FILM COATED ORAL
Qty: 180 TABLET | Refills: 0 | Status: SHIPPED | OUTPATIENT
Start: 2025-05-12

## 2025-05-12 NOTE — TELEPHONE ENCOUNTER
Schwab, Christina sent to [facility]  Caller: Unspecified (Today,  3:47 PM)  See call from today, patient said Dr increased her metoprolol tartrate to 2 daily, call her back

## 2025-05-12 NOTE — TELEPHONE ENCOUNTER
Patient states she has 10 Metoprolol left. Script sent to [facility], patient advised to let us know if she needs a short supply sent to a local pharmacy.

## 2025-05-12 NOTE — TELEPHONE ENCOUNTER
LM for patient to . [facility] states that the Clopidogrel is being mailed today, and the Metoprolol cannot be filled until 5/20/25.

## 2025-05-12 NOTE — TELEPHONE ENCOUNTER
Refill metoprolol tartrate 25 MG Oral Tab & CLOPIDOGREL 75 MG Oral Tab to [facility], she is very low on both

## 2025-05-17 ENCOUNTER — TELEPHONE (OUTPATIENT)
Dept: FAMILY MEDICINE CLINIC | Facility: CLINIC | Age: 75
End: 2025-05-17

## 2025-05-17 NOTE — TELEPHONE ENCOUNTER
Patient said she has been taking her Amlodipine and Metoprolol 25mg BID.   She said her blood pressures have been elevated still. This morning at 725am it was 129/71 and two hours after taking her medication it was 135/69. She said in the evening it is high, the other night it was 168/86.  She said that her home health nurse told her to call her cardiologist.   She also wanted Dr Burnett to know she will be seeing a TMJ specialist at [facility] in June.

## 2025-05-17 NOTE — TELEPHONE ENCOUNTER
Amlodipine at top dose 10 mg continue daily and most of her BP are fine anything over 140/90 is too high, if she is consistently getting these numbers then increase metoprolol to 50 mg BID. Her pressure have been less here.

## 2025-05-20 ENCOUNTER — TELEPHONE (OUTPATIENT)
Dept: FAMILY MEDICINE CLINIC | Facility: CLINIC | Age: 75
End: 2025-05-20

## 2025-05-20 RX ORDER — METOPROLOL TARTRATE 25 MG/1
TABLET, FILM COATED ORAL
COMMUNITY
Start: 2025-05-20

## 2025-05-20 NOTE — TELEPHONE ENCOUNTER
Patient states that she has had a headache for the last few days and she has been having some shortness of breath. She states her oxygen saturation has been 92%-94%.  She said said she called Dr Paulino's office and has an appointment with him for June 5th, this is the soonest she can get a ride there. She said her blood pressure has still been elevated 140s/70s-80s. It was 120/60 last night. She is only taking Metoprolol 25mg BID, she was not aware she could increase to 50mg BID.

## 2025-05-22 ENCOUNTER — TELEPHONE (OUTPATIENT)
Dept: FAMILY MEDICINE CLINIC | Facility: CLINIC | Age: 75
End: 2025-05-22

## 2025-05-22 RX ORDER — GABAPENTIN 100 MG/1
CAPSULE ORAL
Qty: 90 CAPSULE | Refills: 0 | Status: SHIPPED | OUTPATIENT
Start: 2025-05-22 | End: 2025-05-22

## 2025-05-22 RX ORDER — GABAPENTIN 100 MG/1
CAPSULE ORAL
COMMUNITY
Start: 2025-05-22

## 2025-05-22 RX ORDER — ALBUTEROL SULFATE 0.83 MG/ML
2.5 SOLUTION RESPIRATORY (INHALATION) EVERY 4 HOURS PRN
Qty: 50 EACH | Refills: 3 | Status: SHIPPED | OUTPATIENT
Start: 2025-05-22 | End: 2025-06-24

## 2025-05-22 RX ORDER — GABAPENTIN 100 MG/1
100 CAPSULE ORAL 2 TIMES DAILY
COMMUNITY
Start: 2025-05-06

## 2025-05-22 NOTE — TELEPHONE ENCOUNTER
Chente advised.  [facility] paperwork for daughter Chrystal faxed to [place] [phone]

## 2025-05-22 NOTE — TELEPHONE ENCOUNTER
Patient said that she has been taking 2 Metoprolol 25mg BID since 5/17/25. She said that her blood pressure this morning was 141/81 then 129/62, pulse was 63 after she took her blood pressure. She is seeing Dr Paulino tomorrow, she has been having some shortness of breath.   She said she is having neck pain and the Gabapentin 100mg Bid, she asked if she can increase the amount she takes. She also needs a refill on her Albuterol for the nebulizer solution.   Last Ov w/Dr Burnett was 4/15/25.

## 2025-05-22 NOTE — TELEPHONE ENCOUNTER
Patient needs to talk to nurse about blood pressure.  And the gabapentin that she is taking 100mg BID and it is not working.  Pt is leaving at 11:30, she has a doctor's appointment.

## 2025-05-22 NOTE — TELEPHONE ENCOUNTER
NO increase in gabapentin; stay on 50 mg metoprolol BID your blood pressure will naturally  fluctuate.

## 2025-05-28 ENCOUNTER — TELEPHONE (OUTPATIENT)
Dept: FAMILY MEDICINE CLINIC | Facility: CLINIC | Age: 75
End: 2025-05-28

## 2025-05-28 RX ORDER — AMLODIPINE BESYLATE 10 MG/1
10 TABLET ORAL DAILY
Qty: 90 TABLET | Refills: 3 | Status: SHIPPED | OUTPATIENT
Start: 2025-05-28

## 2025-05-28 NOTE — TELEPHONE ENCOUNTER
Last refill: 02/25/25  Qty 90  W/ 0 refills  Last ov: 04/15/15  Last labs 01/13/25    Requested Prescriptions     Pending Prescriptions Disp Refills    AMLODIPINE 10 MG Oral Tab [Pharmacy Med Name: amLODIPine Besylate Oral Tablet 10 MG] 90 tablet 3     Sig: TAKE 1 TABLET EVERY DAY     No future appointments.

## 2025-05-28 NOTE — TELEPHONE ENCOUNTER
Spoke to  to let him know that albuterol has been sent on the 22nd, he will call if it is not there.

## 2025-06-02 ENCOUNTER — TELEPHONE (OUTPATIENT)
Dept: FAMILY MEDICINE CLINIC | Facility: CLINIC | Age: 75
End: 2025-06-02

## 2025-06-02 NOTE — TELEPHONE ENCOUNTER
Patient's daughter Chrystal asked for a letter to be written excusing her from an upcoming hearing with the state regarding funding home care services. LM for Chrystal that the letter was received but we are unable to ready the fax number or where the letter needs to be sent.

## 2025-06-09 ENCOUNTER — TELEPHONE (OUTPATIENT)
Dept: FAMILY MEDICINE CLINIC | Facility: CLINIC | Age: 75
End: 2025-06-09

## 2025-06-09 NOTE — TELEPHONE ENCOUNTER
Daughter advised that paperwork was faxed to the [facility] on 6/9/25 [phone].

## 2025-06-09 NOTE — TELEPHONE ENCOUNTER
Checking on paperwork - caregivers paperwork. From 06/02/25.    There was a problem with transmission of fax and daughter wants to double check that everything is good to go. This was paperwork regarding keeping their caregivers in home. Also see phone note for  regarding same paperwork.

## 2025-06-16 ENCOUNTER — TELEPHONE (OUTPATIENT)
Dept: FAMILY MEDICINE CLINIC | Facility: CLINIC | Age: 75
End: 2025-06-16

## 2025-06-16 NOTE — TELEPHONE ENCOUNTER
Patient said the nurse came to see her last Tuesday. She said she has OT,PT and nursing coming to see [facility] from [facility]. She said she is confused about who is coming when. Patient advised that we will give them a call to see if they can leave her a calendar of who is coming when.

## 2025-06-17 NOTE — TELEPHONE ENCOUNTER
Mallory from [facility] states that patient was seen on 6/5, 6/11 and 6/12. There will be a therapist seeing her on 6/23. Mallory said that since patient has increased confusion they will have a nurse go out and see her tomorrow. Patient advised. She said that she has an appointment to have her wrist looked at tomorrow at 1pm.

## 2025-06-17 NOTE — TELEPHONE ENCOUNTER
Jany from [facility] said that they next date she was going to see patient is scheduled for 6/30/25 because the patient's schedule is busy. Jany advised that Dr Burnett would like a urine culture and U/A. She will collect this from patient tomorrow.

## 2025-06-17 NOTE — TELEPHONE ENCOUNTER
HOME HEALTH CALLBACK  Received: Today  Peter Olivera sent to [facility]  Caller: Unspecified (Today,  4:06 PM)  HOME HEALTH CALLING DAX BROWN, [phone]

## 2025-06-18 ENCOUNTER — LAB REQUISITION (OUTPATIENT)
Dept: LAB | Facility: HOSPITAL | Age: 75
End: 2025-06-18
Payer: MEDICARE

## 2025-06-18 DIAGNOSIS — N39.0 URINARY TRACT INFECTION, SITE NOT SPECIFIED: ICD-10-CM

## 2025-06-18 LAB
BILIRUB UR QL STRIP.AUTO: NEGATIVE
CLARITY UR REFRACT.AUTO: CLEAR
COLOR UR AUTO: YELLOW
GLUCOSE UR STRIP.AUTO-MCNC: NORMAL MG/DL
KETONES UR STRIP.AUTO-MCNC: NEGATIVE MG/DL
LEUKOCYTE ESTERASE UR QL STRIP.AUTO: NEGATIVE
NITRITE UR QL STRIP.AUTO: NEGATIVE
PH UR STRIP.AUTO: 6.5 [PH] (ref 5–8)
PROT UR STRIP.AUTO-MCNC: NEGATIVE MG/DL
RBC UR QL AUTO: NEGATIVE
SP GR UR STRIP.AUTO: 1.02 (ref 1–1.03)
UROBILINOGEN UR STRIP.AUTO-MCNC: NORMAL MG/DL

## 2025-06-18 PROCEDURE — 81003 URINALYSIS AUTO W/O SCOPE: CPT | Performed by: INTERNAL MEDICINE

## 2025-06-21 DIAGNOSIS — E78.5 HYPERLIPIDEMIA, UNSPECIFIED HYPERLIPIDEMIA TYPE: ICD-10-CM

## 2025-06-21 NOTE — TELEPHONE ENCOUNTER
Refill request -    gabapentin 100 MG Oral Cap # 90 caps 0 refills, last fill 5/22/25,** too soon     rOPINIRole 0.5 MG Oral Tab # 90 tabs 1 refill  **too soon, last fill 4/23/25    simvastatin 20 MG Oral Tab # 90 tabs 3 refills   ** too soon, last fill 5/5/25      sennosides-docusate 8.6-50 MG Oral Tab # 90 tabs 0 refills, last fill 3/31/25      Last OV 4/15/25

## 2025-06-23 ENCOUNTER — TELEPHONE (OUTPATIENT)
Dept: FAMILY MEDICINE CLINIC | Facility: CLINIC | Age: 75
End: 2025-06-23

## 2025-06-23 DIAGNOSIS — E78.5 HYPERLIPIDEMIA, UNSPECIFIED HYPERLIPIDEMIA TYPE: ICD-10-CM

## 2025-06-23 RX ORDER — SIMVASTATIN 20 MG
20 TABLET ORAL NIGHTLY
Qty: 90 TABLET | Refills: 3 | OUTPATIENT
Start: 2025-06-23

## 2025-06-23 RX ORDER — AMOXICILLIN 250 MG
1 CAPSULE ORAL DAILY
Qty: 90 TABLET | Refills: 0 | Status: SHIPPED | OUTPATIENT
Start: 2025-06-23 | End: 2025-09-21

## 2025-06-23 NOTE — TELEPHONE ENCOUNTER
Send letter in for hearing for caregiver appeal 06/25/25, has not heard anything regarding hearing

## 2025-06-23 NOTE — TELEPHONE ENCOUNTER
Gastrointestional Medication Protocol Passed06/21/2025 11:52 AM   Protocol Details In person appointment or virtual visit in the past 12 mos or appointment in next 3 mos    Medication is active on med list

## 2025-06-23 NOTE — TELEPHONE ENCOUNTER
CHRISTY Jarrell to [facility]. Letter was faxed to [place] [employer] [facility] on 6/23/25.

## 2025-06-23 NOTE — TELEPHONE ENCOUNTER
Last refill: 05/05/25  Qty 90  W/ 3 refills  Last ov: 04/15/25  Last labs 02/03/25    Requested Prescriptions     Pending Prescriptions Disp Refills    SIMVASTATIN 20 MG Oral Tab [Pharmacy Med Name: Simvastatin Oral Tablet 20 MG] 90 tablet 3     Sig: TAKE 1 TABLET EVERY NIGHT     No future appointments.     Patient's anemia is currently controlled. Has not received any PRBCs to date this admission. Etiology likely d/t chronic disease.  Current CBC reviewed-   Lab Results   Component Value Date    HGB 7.6 (L) 12/12/2022    HCT 23.1 (L) 12/12/2022     Monitor serial CBC and transfuse if patient becomes hemodynamically unstable, symptomatic or H/H drops below 7/21.   Drop in h/h overnight - monitor

## 2025-06-24 NOTE — TELEPHONE ENCOUNTER
Daughter Chrystal said that they are going to have the hearing tomorrow afternoon at 3pm. Chrystal advised that we did fax the letters from Dr Burnett on 6/9/25. Letters re-faxed to the [facility] and Family [facility] [phone].

## 2025-07-01 DIAGNOSIS — E78.5 HYPERLIPIDEMIA, UNSPECIFIED HYPERLIPIDEMIA TYPE: ICD-10-CM

## 2025-07-01 NOTE — TELEPHONE ENCOUNTER
Last OV w/Dr Burnett was 4/15/25  Last Lipid was 10/30/24  Last refill on Sennoside was 6/23/25 to [facility], last refill of Simvastatin was 5/5/25, last refill of Ropinirole was 4/23/25, last refill on Gabapentin was 5/6/25 #180 by Nisreen Mckinney NP.

## 2025-07-01 NOTE — TELEPHONE ENCOUNTER
Patient needs refill, sennosides-docusate 8.6-50 MG Oral Tab, gabapentin 100 MG Oral Cap, simvastatin 20 MG Oral Tab and rOPINIRole 0.5 MG Oral Tab. [facility]

## 2025-07-02 RX ORDER — AMOXICILLIN 250 MG
1 CAPSULE ORAL DAILY
Qty: 90 TABLET | Refills: 0 | Status: SHIPPED | OUTPATIENT
Start: 2025-07-02 | End: 2025-07-07

## 2025-07-02 RX ORDER — ROPINIROLE 0.5 MG/1
0.5 TABLET, FILM COATED ORAL NIGHTLY
Qty: 90 TABLET | Refills: 1 | Status: SHIPPED | OUTPATIENT
Start: 2025-07-02 | End: 2025-12-29

## 2025-07-02 RX ORDER — SIMVASTATIN 20 MG
20 TABLET ORAL NIGHTLY
Qty: 90 TABLET | Refills: 3 | Status: SHIPPED | OUTPATIENT
Start: 2025-07-02

## 2025-07-02 RX ORDER — GABAPENTIN 100 MG/1
100 CAPSULE ORAL 2 TIMES DAILY
Qty: 180 CAPSULE | Refills: 0 | Status: SHIPPED | OUTPATIENT
Start: 2025-07-02 | End: 2025-09-30

## 2025-07-07 ENCOUNTER — TELEPHONE (OUTPATIENT)
Dept: FAMILY MEDICINE CLINIC | Facility: CLINIC | Age: 75
End: 2025-07-07

## 2025-07-07 NOTE — TELEPHONE ENCOUNTER
Pt calls and asks which meds were refilled on 7/2?  Advised pt of meds refilled.  Pt states she no longer takes sennosides-docusate and would like the refill canceled AND medication removed form her medlist.  PC to [facility] to cancel script and med removed from med list

## 2025-07-09 ENCOUNTER — TELEPHONE (OUTPATIENT)
Dept: FAMILY MEDICINE CLINIC | Facility: CLINIC | Age: 75
End: 2025-07-09

## 2025-07-09 NOTE — TELEPHONE ENCOUNTER
Spoke with pt. She is not home, so she doesn't have her HH nurse's ph#.  Gave pt the main ph# for [facility]---[phone].  She will call them and have them let her nurse know

## 2025-07-09 NOTE — TELEPHONE ENCOUNTER
Home health nurse is coming today between 3 and 4, patient will not be home and needs help canceling she isn't sure how to reach them.

## 2025-07-24 RX ORDER — METOPROLOL TARTRATE 25 MG/1
TABLET, FILM COATED ORAL
Qty: 180 TABLET | Refills: 3 | Status: SHIPPED | OUTPATIENT
Start: 2025-07-24

## 2025-07-24 NOTE — TELEPHONE ENCOUNTER
Hypertension Medications Protocol [id number]/24/2025 02:17 AM   Protocol Details Medication is active on med list    CMP or BMP in past 12 months    Last BP reading less than 140/90    In person appointment or virtual visit in the past 12 mos or appointment in next 3 mos    EGFRCR or GFRNAA > 50          Last office visit:  04/15/25  Last refill:  05/12/25  #180, no refills  Last cmp:  02/03/25  BP Readings from Last 3 Encounters:   04/15/25 136/72   01/13/25 108/64   12/19/24 116/68     No future appointments.

## 2025-08-05 ENCOUNTER — TELEPHONE (OUTPATIENT)
Dept: FAMILY MEDICINE CLINIC | Facility: CLINIC | Age: 75
End: 2025-08-05

## 2025-08-08 ENCOUNTER — TELEPHONE (OUTPATIENT)
Dept: FAMILY MEDICINE CLINIC | Facility: CLINIC | Age: 75
End: 2025-08-08

## (undated) DIAGNOSIS — E87.1 HYPONATREMIA: Primary | ICD-10-CM

## (undated) DIAGNOSIS — S42.291G CLOSED FRACTURE OF HEAD OF RIGHT HUMERUS WITH DELAYED HEALING, SUBSEQUENT ENCOUNTER: Primary | ICD-10-CM

## (undated) DEVICE — MEDI-VAC SUCTION HANDLE REGULAR CAPACITY: Brand: CARDINAL HEALTH

## (undated) DEVICE — STRYKER PERFORMANCE SERIES SAGITTAL BLADE: Brand: STRYKER PERFORMANCE SERIES

## (undated) DEVICE — WRAP HIP COMPR

## (undated) DEVICE — 3M™ STERI-DRAPE™ INSTRUMENT POUCH 1018: Brand: STERI-DRAPE™

## (undated) DEVICE — 1200CC GUARDIAN II: Brand: GUARDIAN

## (undated) DEVICE — GLOVE SUR 8 SENSICARE PI PIP CRM PWD F

## (undated) DEVICE — SOLUTION IRRIG 3000ML 0.9% NACL FLX CONT

## (undated) DEVICE — GLOVE SUR 6.5 SENSICARE PI PIP GRN PWD F

## (undated) DEVICE — ENDOSCOPY PACK UPPER: Brand: MEDLINE INDUSTRIES, INC.

## (undated) DEVICE — GLOVE SUR 7 SENSICARE PI PIP CRM PWD F

## (undated) DEVICE — GLOVE SUR 6.5 SENSICARE PI PIP CRM PWD F

## (undated) DEVICE — SYSTEM VAC MIX SGL DBL CLEARMIX 10 PER CA

## (undated) DEVICE — SUT FBRWR 2 38IN N ABSRB BLU L26.5MM 1/2

## (undated) DEVICE — SUT COAT VCRL 0 27IN CP-1 ABSRB UD 36MM 1/2

## (undated) DEVICE — SUT STRATAFIX SPRL MCRYL+ 2-0 27IN CT-1 ABSRB

## (undated) DEVICE — DRAPE,TOP,102X53,STERILE: Brand: MEDLINE

## (undated) DEVICE — Device

## (undated) DEVICE — DRAPE,TOWEL,LARGE,INVISISHIELD: Brand: MEDLINE

## (undated) DEVICE — HOOD, PEEL-AWAY: Brand: FLYTE

## (undated) DEVICE — 3M™ RED DOT™ MONITORING ELECTRODE WITH FOAM TAPE AND STICKY GEL, 50/BAG, 20/CASE, 72/PLT 2570: Brand: RED DOT™

## (undated) DEVICE — Device: Brand: DEFENDO AIR/WATER/SUCTION AND BIOPSY VALVE

## (undated) DEVICE — MEDI-VAC NON-CONDUCTIVE SUCTION TUBING: Brand: CARDINAL HEALTH

## (undated) DEVICE — 3M™ IOBAN™ 2 ANTIMICROBIAL INCISE DRAPE 6650EZ: Brand: IOBAN™ 2

## (undated) DEVICE — NEEDLE SPNL 18GA L3.5IN PNK QNCKE STYL DISP

## (undated) DEVICE — SYRINGE MED 30ML STD CLR PLAS LL TIP N CTRL

## (undated) DEVICE — COVER LT HNDL RIG FOR SUR CAM DISP

## (undated) DEVICE — SUT STRATAFIX SYMMTRC PDS+ 1 18IN CTX ABSRB V

## (undated) DEVICE — CEMENT MIXING SYSTEM WITH FEMORAL BREAKWAY NOZZLE: Brand: REVOLUTION

## (undated) DEVICE — 1016 S-DRAPE IRRIG POUCH 10/BOX: Brand: STERI-DRAPE™

## (undated) DEVICE — FORCEP BIOPSY RJ4 LG CAP W/ND

## (undated) DEVICE — SUT MCRYL 3-0 27IN ABSRB UD 19MM PS-2 3/8

## (undated) DEVICE — BIPOLAR SEALER 23-112-1 AQM 6.0: Brand: AQUAMANTYS™

## (undated) DEVICE — GLOVE SUR 7.5 SENSICARE PI PIP GRN PWD F

## (undated) DEVICE — ANTERIOR HIP: Brand: MEDLINE INDUSTRIES, INC.

## (undated) DEVICE — SHEET,DRAPE,70X100,STERILE: Brand: MEDLINE

## (undated) DEVICE — ELECTRODE PTFE BLADE 6': Brand: MEDLINE

## (undated) DEVICE — PENCIL ES BTTN SWCH W/ TIP HOLSTER E-Z CLN

## (undated) DEVICE — MASK ETCO2 PANORAMIC

## (undated) NOTE — LETTER
08/12/21        [facility] [place] [address]      Dear Nora Vazquez,    [facility] records indicate that you have outstanding lab work and or testing that was ordered for you and has not yet been completed:  Orders Placed This Encounter

## (undated) NOTE — LETTER
OUTSIDE TESTING RESULT REQUEST     IMPORTANT: FOR YOUR IMMEDIATE ATTENTION  Please FAX all test results listed below to: [phone]     Testing already done on or about: 24    * * * * If testing is NOT complete, arrange with patient A.S.A.P. * * * *      Patient Name: Leann Chatman  Surgery Date: 2024  Medical Record: [id number]  CSN: [id number]  : 1950 - A: 74 y     Sex: female  Surgeon(s):  Junior Anaya MD  Procedure: LEFT ANTERIOR TOTAL HIP ARTHROPLASTY  Anesthesia Type: Spinal     Surgeon: Junior Anaya MD     The following Testing and Time Line are REQUIRED PER ANESTHESIA     EKG READ AND SIGNED WITHIN   90 days  CBC, Platelet; NO Differential within  90 days  BMP (requires 4 hour fast) within  90 days  MSSA/MRSA Nasal screening within 30 days      Thank You,   Sent by:MAYELA Felipe

## (undated) NOTE — LETTER
July 17, 2023    Lisa Quijano [facility] [address]      Dear Jose Mills: It was a pleasure speaking with you over the phone recently. To follow up, I wanted to send you my contact information to utilize when you have a question and or need some assistance. We are excited that you have decided to give our Chronic Care Management program a try. I am available to provide you with the support and education needed to keep you healthy. Together We Will Work On:    Coordination of Care: Helping to reduce duplicate orders/tests to save you time and money  Medications: Review, Educate, & Discuss any concerns or issues you may have  Personalized education and support regarding your health. These services, among others will help you take control of your health and provide you with optimal quality care. I have attached a more in depth review of the program to help outline the information we had talked about over the phone. If you have any questions or concerns please feel free to contact myself your Health Care Manager and/or your insurance company for more details. I look forward to working with you,    [address] [place] [address] Management Dept. Miguel [address] Floor  [address]   Office [phone]  Alex Vanessa [email or web address]. [email or web address]

## (undated) NOTE — LETTER
3/14/2019              Holden Avalos        [address] [place] [address]         To whom it may concern,    Holden Avalos is currently a patient under my medical care.   The patient's medical condition makes serving on jury duty inadvisable

## (undated) NOTE — LETTER
Date: 4/1/2025    Patient Name: Leann Chatman          To Whom it may concern:    The above patient was seen at [facility] for treatment of neurologic disorder.    Leann Chatman is a 74 y.o. female w/ hx of PAPT (progressive ataxia with palatal tremor) for which she sees a specialist at [facility], in addition to anxiety, cataracts, HTN, HLD, bipolar d/o, tardive dyskinesia who complaints that her teeth are not right since a procedure. She has seen several dentists.  She tends to perseverate over the correction of her teeth and her eyes which she also is very unsatisfied with.  I think she deserves a fair evaluation, but was not sure she would provide all the information.         Sincerely,    Anne Burnett MD

## (undated) NOTE — MR AVS SNAPSHOT
Jaime Gan  [address] [facility] [address]  [phone]               Thank you for choosing us for your health care visit with Zaynab Siegel DO.   We are glad to serve you and happy to provide you with this summ Keflex     Levaquin     Lexapro     Zyrtec [Cetirizine Hcl]                 Today's Vital Signs     BP Pulse Temp Height Weight BMI    110/74 mmHg 62 97.7 °F (36.5 °C) (Tympanic) 62\" 180 lb 2 oz 32.94 kg/m2         Current Medications          This list What changed:  when to take this   Commonly known as:  BRAD           Vitamin D3 2000 units Caps   TAKE TWO CAPS OF 2,000 MG DAILY TO EQUAL 4,000 MG DAILY   Commonly known as:  VITAMIN D3                   MyChart     Visit MyChart  You can access your M

## (undated) NOTE — MR AVS SNAPSHOT
[facility]  [address] [facility], [address] [place] [address]                    After Visit Summary   4/5/2017    Dane Ashford    MRN: [id number]           Visit Information        Provider Department Dept Phone    4/5/201 Vitamin D3 (VITAMIN D3) 2000 UNITS Oral Cap TAKE TWO CAPS OF 2,000 MG DAILY TO EQUAL 4,000 MG DAILY    thioTHIXene (NAVANE) 1 MG Oral Cap Take 1 capsule by mouth 3 (three) times daily.     Omega-3 Fatty Acids (FISH OIL) 1200 MG Oral Cap Take  by mouth marlo

## (undated) NOTE — MR AVS SNAPSHOT
[facility]  [address] [facility] [address]  [phone]               Thank you for choosing us for your health care visit with Blake Gorman DO.   We are glad to serve you and happy to provide you with this summ Take 1 tablet (10 mg total) by mouth daily. Commonly known as:  NORVASC           aspirin 81 MG Chew   Chew 81 mg by mouth daily. B-12 500 MCG Tabs   Take 1,000 Units by mouth.            CENTRUM SILVER ULTRA WOMENS Tabs   Take 1 tablet by mouth [email or web address]. If you've recently had a stay at the Hospital you can access your discharge instructions in [employer] by going to Visits < Admission Summaries.  If you've been to the Emergency Department or your doctor's office, you can view yo

## (undated) NOTE — LETTER
Date: 8/12/2021    Patient Name: Stormy Duverney          To Whom it may concern: This letter has been written at the patient's request. The above patient was seen at the [facility] for treatment of a medical condition.       The patient is

## (undated) NOTE — LETTER
January 2, 2024    Dear Leann/ Eugene:    It was a pleasure speaking with you over the phone recently. To follow up, I wanted to send you my contact information to utilize when you have a question and or need some assistance. I am available Monday - Friday from 8 AM to 4 PM.    Together We Can Work On:    Coordination of Care: Helping to reduce duplicate orders/tests to save you time and money  Medications: Review, Educate, & Discuss any concerns or issues you may have  Personalized education and support regarding your health.    These services, among others will help you take control of your health and provide you with optimal quality care. I have attached a more in depth review of the program to help outline the information we had talked about over the phone. If you have any questions or concerns please feel free to contact myself your Health Care Manager and/or your insurance company for more details.     I look forward to working with you,      Alyse Camacho [facility]     CCM Care Manager/Health    Population Health Management Dept.  [email or web address]  Office [phone]   [facility] [place] [address]

## (undated) NOTE — LETTER
Date: 7/11/2024    Patient Name: Leann Chatman          To Whom it may concern:    This letter has been written at the patient's request. The above patient was seen at [facility] for treatment of osteoarthritis    This patient should be excused from attending a hearing for cessation of home health. .    The patient does not have the cognitive capability to carry on this conversation and her  is very ill with lung cancer.         Sincerely,    Anne Burnett MD

## (undated) NOTE — LETTER
Date: 8/12/2021    Patient Name: Jose العراقي          To Whom it may concern: This letter has been written at the patient's request. The above patient was seen at the [facility] for treatment of a medical condition.     The patient is me